# Patient Record
Sex: FEMALE | Race: WHITE | Employment: FULL TIME | URBAN - METROPOLITAN AREA
[De-identification: names, ages, dates, MRNs, and addresses within clinical notes are randomized per-mention and may not be internally consistent; named-entity substitution may affect disease eponyms.]

---

## 2017-01-04 RX ORDER — RANITIDINE 300 MG/1
300 TABLET ORAL NIGHTLY
Qty: 90 TABLET | Refills: 3 | Status: SHIPPED | OUTPATIENT
Start: 2017-01-04 | End: 2018-02-07 | Stop reason: SDUPTHER

## 2017-03-27 ENCOUNTER — EMPLOYEE WELLNESS (OUTPATIENT)
Dept: OTHER | Age: 62
End: 2017-03-27

## 2017-05-15 RX ORDER — OMEPRAZOLE 40 MG/1
40 CAPSULE, DELAYED RELEASE ORAL EVERY MORNING
Qty: 90 CAPSULE | Refills: 3 | Status: SHIPPED | OUTPATIENT
Start: 2017-05-15 | End: 2018-02-10 | Stop reason: SDUPTHER

## 2017-10-10 ENCOUNTER — OFFICE VISIT (OUTPATIENT)
Dept: INTERNAL MEDICINE | Age: 62
End: 2017-10-10

## 2017-10-10 VITALS
DIASTOLIC BLOOD PRESSURE: 88 MMHG | WEIGHT: 183 LBS | HEART RATE: 88 BPM | TEMPERATURE: 98 F | OXYGEN SATURATION: 99 % | BODY MASS INDEX: 28.72 KG/M2 | HEIGHT: 67 IN | SYSTOLIC BLOOD PRESSURE: 130 MMHG

## 2017-10-10 DIAGNOSIS — R00.2 HEART PALPITATIONS: Primary | ICD-10-CM

## 2017-10-10 DIAGNOSIS — M85.80 OSTEOPENIA, UNSPECIFIED LOCATION: Chronic | ICD-10-CM

## 2017-10-10 DIAGNOSIS — R06.2 WHEEZING: ICD-10-CM

## 2017-10-10 DIAGNOSIS — R05.9 COUGH: ICD-10-CM

## 2017-10-10 PROCEDURE — 99213 OFFICE O/P EST LOW 20 MIN: CPT | Performed by: INTERNAL MEDICINE

## 2017-10-10 ASSESSMENT — ENCOUNTER SYMPTOMS
ABDOMINAL DISTENTION: 0
COUGH: 1
SORE THROAT: 0
NAUSEA: 0
ABDOMINAL PAIN: 0
CONSTIPATION: 0
BLOOD IN STOOL: 0
CHOKING: 0
HEARTBURN: 1
WHEEZING: 1
TROUBLE SWALLOWING: 0
SHORTNESS OF BREATH: 0
RHINORRHEA: 0

## 2017-10-10 ASSESSMENT — PATIENT HEALTH QUESTIONNAIRE - PHQ9
SUM OF ALL RESPONSES TO PHQ9 QUESTIONS 1 & 2: 0
SUM OF ALL RESPONSES TO PHQ QUESTIONS 1-9: 0
1. LITTLE INTEREST OR PLEASURE IN DOING THINGS: 0
2. FEELING DOWN, DEPRESSED OR HOPELESS: 0

## 2017-10-10 NOTE — PROGRESS NOTES
, no children    Worked for [employer] x 41 years (2017)    Lives in a house in [place] with spouse    Hobbies reading, music, yard work, travel, shopping        Family History   Problem Relation Age of Onset    Hypertension Mother    [facility] Sudden Death Mother      dec age 80    Alzheimer's Disease Father      dec age 80       Family and social history were reviewed and pertinent changes documented. ALLERGIES    Tape [adhesive tape]; Codeine; Diazepam; and Valium    Current Outpatient Prescriptions on File Prior to Visit   Medication Sig Dispense Refill    omeprazole (PRILOSEC) 40 MG delayed release capsule Take 1 capsule by mouth every morning 90 capsule 3    ranitidine (ZANTAC) 300 MG tablet Take 1 tablet by mouth nightly 90 tablet 3    levalbuterol (XOPENEX HFA) 45 MCG/ACT inhaler Inhale 1-2 puffs into the lungs every 4 hours as needed for Wheezing 1 Inhaler 3    calcium carbonate (OSCAL) 500 MG TABS tablet Take 500 mg by mouth daily. Current Facility-Administered Medications on File Prior to Visit   Medication Dose Route Frequency Provider Last Rate Last Dose    methylPREDNISolone acetate (DEPO-MEDROL) injection 80 mg  80 mg Intramuscular Once Yulisa Clark MD           Review of Systems   Constitutional: Negative for activity change, appetite change, chills, diaphoresis, fatigue, fever, malaise/fatigue and unexpected weight change. HENT: Negative for postnasal drip, rhinorrhea, sore throat and trouble swallowing. Eyes: Negative for visual disturbance. Respiratory: Positive for cough and wheezing. Negative for choking and shortness of breath. Cardiovascular: Positive for palpitations. Negative for chest pain and near-syncope. Gastrointestinal: Positive for heartburn. Negative for abdominal distention, abdominal pain, blood in stool, constipation and nausea. Endocrine: Negative for cold intolerance, heat intolerance, polydipsia and polyuria. Genitourinary: Negative.   Negative for Referred to Provider:   Faustina Arguello MD     Requested Specialty:   Cardiology     Number of Visits Requested:   1    FULL PFT STUDY WITH PRE AND POST     Standing Status:   Future     Standing Expiration Date:   10/10/2018     Further workup and plan will be determined based on clinical progression and follow up test/ treatment results. Close follow up needed to evaluate treatment results and for care coordination. Return in about 1 year (around 10/10/2018). I have reviewed the patient's medical and surgical, family and social history, health maintenance schedule, and updated the computerized patient record.        Electronically signed by Fatimah Lopez MD

## 2017-10-24 ENCOUNTER — HOSPITAL ENCOUNTER (OUTPATIENT)
Dept: PULMONOLOGY | Age: 62
Discharge: HOME | End: 2017-10-24
Payer: COMMERCIAL

## 2017-10-24 DIAGNOSIS — R05.9 COUGH: ICD-10-CM

## 2017-10-24 DIAGNOSIS — R06.2 WHEEZING: ICD-10-CM

## 2017-10-24 PROCEDURE — 94726 PLETHYSMOGRAPHY LUNG VOLUMES: CPT

## 2017-10-24 PROCEDURE — 6360000002 HC RX W HCPCS: Performed by: INTERNAL MEDICINE

## 2017-10-24 PROCEDURE — 94060 EVALUATION OF WHEEZING: CPT

## 2017-10-24 PROCEDURE — 94729 DIFFUSING CAPACITY: CPT

## 2017-10-24 RX ORDER — ALBUTEROL SULFATE 2.5 MG/3ML
2.5 SOLUTION RESPIRATORY (INHALATION) ONCE
Status: COMPLETED | OUTPATIENT
Start: 2017-10-24 | End: 2017-10-24

## 2017-10-24 RX ADMIN — ALBUTEROL SULFATE 2.5 MG: 2.5 SOLUTION RESPIRATORY (INHALATION) at 09:53

## 2017-10-30 PROCEDURE — 94726 PLETHYSMOGRAPHY LUNG VOLUMES: CPT | Performed by: INTERNAL MEDICINE

## 2017-10-30 PROCEDURE — 94729 DIFFUSING CAPACITY: CPT | Performed by: INTERNAL MEDICINE

## 2017-10-30 PROCEDURE — 94060 EVALUATION OF WHEEZING: CPT | Performed by: INTERNAL MEDICINE

## 2017-11-08 ENCOUNTER — OFFICE VISIT (OUTPATIENT)
Dept: CARDIOLOGY | Age: 62
End: 2017-11-08

## 2017-11-08 VITALS
HEIGHT: 67 IN | DIASTOLIC BLOOD PRESSURE: 82 MMHG | TEMPERATURE: 97.6 F | OXYGEN SATURATION: 98 % | HEART RATE: 105 BPM | BODY MASS INDEX: 28.88 KG/M2 | WEIGHT: 184 LBS | RESPIRATION RATE: 16 BRPM | SYSTOLIC BLOOD PRESSURE: 118 MMHG

## 2017-11-08 DIAGNOSIS — I49.9 IRREGULAR HEART BEAT: Primary | ICD-10-CM

## 2017-11-08 DIAGNOSIS — R06.09 DOE (DYSPNEA ON EXERTION): ICD-10-CM

## 2017-11-08 PROCEDURE — 99204 OFFICE O/P NEW MOD 45 MIN: CPT | Performed by: INTERNAL MEDICINE

## 2017-11-08 PROCEDURE — 93000 ELECTROCARDIOGRAM COMPLETE: CPT | Performed by: INTERNAL MEDICINE

## 2017-11-08 ASSESSMENT — ENCOUNTER SYMPTOMS
NAUSEA: 0
EYES NEGATIVE: 1
CHEST TIGHTNESS: 0
COUGH: 0
GASTROINTESTINAL NEGATIVE: 1
WHEEZING: 0
SHORTNESS OF BREATH: 1
STRIDOR: 0
BLOOD IN STOOL: 0

## 2017-11-08 NOTE — PROGRESS NOTES
NEW PATIENT        Patient: Trina Antonio  YOB: 1955  MRN: [id number]    Chief Complaint:  Chief Complaint   Patient presents with   Artemio Childress Cardiologist     Referred by Tom    Irregular Heart Beat    Shortness of Breath       Subjective/HPI     EKG:SR with PVC  (computer states 2nd degree AV B- not true)  Past Medical History:   Diagnosis Date    Smith esophagus 2016    Dr Ashish Bolanos    Benign gastric polyp     DJD (degenerative joint disease) of knee     Fibromyalgia     GERD (gastroesophageal reflux disease) 2008    H/O bone density study 11/24/2012    osteopenia, was on Reclast    History of colonoscopy with polypectomy 2016    Dr Dylan Gil, tubulovillous adenoma    History of vitamin D deficiency     Reactive airway disease     PFT 2016       Past Surgical History:   Procedure Laterality Date    BREAST BIOPSY  1990    benign    CATARACT REMOVAL WITH IMPLANT Bilateral     Dr at the [address] [place] [address]  9/14/15    w/polypectomy     HYSTERECTOMY, TOTAL ABDOMINAL      fibroids, endometriosis, age 26   Patelsalma Levin [address]      R side    TONSILLECTOMY AND ADENOIDECTOMY      UPPER GASTROINTESTINAL ENDOSCOPY  9/14/15    w/bx,polypectomy        Family History   Problem Relation Age of Onset    Hypertension Mother     Sudden Death Mother      dec age 80    Alzheimer's Disease Father      dec age 80       Social History     Social History    Marital status:      Spouse name: N/A    Number of children: 0    Years of education: N/A     Occupational History    biometrics technologist, [employer]      Social History Main Topics    Smoking status: Never Smoker    Smokeless tobacco: Never Used    Alcohol use No    Drug use: No    Sexual activity: Not on file     Other Topics Concern    Not on file     Social History Narrative    Born in [facility], one brother in the area, keeps in touch    , no children    Worked for [employer] x 39 11/05/2015     11/05/2015    CO2 24 11/05/2015    BUN 16 11/05/2015    LABALBU 4.5 11/05/2015    CREATININE 0.55 11/05/2015    CALCIUM 9.9 11/05/2015    GFRAA >60.0 11/05/2015    LABGLOM >60.0 11/05/2015    GLUCOSE 79 03/27/2017     Magnesium:  No results found for: MG  TSH:  Lab Results   Component Value Date    TSH 2.260 12/21/2013             Patient Active Problem List   Diagnosis    Fibromyalgia    DJD (degenerative joint disease) of knee    H/O bone density study    Cough       Assessment/Plan:    1. Irregular heart beat  ECG shows PVCs. During ECG she staes she ws symptomatic. She has prior history of extensive PVCs  - EKG 12 Lead    2. NICHOLAS (dyspnea on exertion)  progressivelyworse especially past 2 months. No cp    - NM Myocardial Spect Rest Exercise or Rx; Future  - ECHO Complete 2D W Doppler W Color; Future    If negative, will treat with BB for PVC. Suspect she may have MVP(she described remote \"extra flap of MV)     Counseling:  Heart Healthy Lifestyle, Take Precautions to Prevent Falls and Walk Daily     Return for AFTER TESTS.         Electronically signed by Luisa Fried MD on 11/8/2017 at 2:52 PM

## 2018-02-07 RX ORDER — RANITIDINE 300 MG/1
300 TABLET ORAL NIGHTLY
Qty: 90 TABLET | Refills: 3 | Status: SHIPPED | OUTPATIENT
Start: 2018-02-07 | End: 2021-09-23 | Stop reason: ALTCHOICE

## 2018-02-12 RX ORDER — OMEPRAZOLE 40 MG/1
CAPSULE, DELAYED RELEASE ORAL
Qty: 90 CAPSULE | Refills: 3 | Status: SHIPPED | OUTPATIENT
Start: 2018-02-12 | End: 2018-05-08 | Stop reason: SDUPTHER

## 2018-03-20 VITALS — WEIGHT: 181 LBS | BODY MASS INDEX: 28.35 KG/M2

## 2018-04-26 ENCOUNTER — EMPLOYEE WELLNESS (OUTPATIENT)
Dept: INTERNAL MEDICINE CLINIC | Age: 63
End: 2018-04-26

## 2018-04-26 LAB
CHOLESTEROL, TOTAL: 236 MG/DL (ref 0–199)
GLUCOSE BLD-MCNC: 83 MG/DL (ref 74–109)
HDLC SERPL-MCNC: 68 MG/DL (ref 40–59)
LDL CHOLESTEROL CALCULATED: 151 MG/DL (ref 0–129)
TRIGL SERPL-MCNC: 87 MG/DL (ref 0–200)

## 2018-05-02 VITALS — WEIGHT: 186 LBS | BODY MASS INDEX: 29.13 KG/M2

## 2018-05-09 RX ORDER — OMEPRAZOLE 40 MG/1
40 CAPSULE, DELAYED RELEASE ORAL DAILY
Qty: 90 CAPSULE | Refills: 3 | Status: SHIPPED | OUTPATIENT
Start: 2018-05-09 | End: 2019-01-31

## 2018-10-01 ENCOUNTER — ANESTHESIA (OUTPATIENT)
Dept: ENDOSCOPY | Age: 63
End: 2018-10-01
Payer: COMMERCIAL

## 2018-10-01 ENCOUNTER — HOSPITAL ENCOUNTER (OUTPATIENT)
Age: 63
Setting detail: OUTPATIENT SURGERY
Discharge: HOME | End: 2018-10-01
Attending: INTERNAL MEDICINE | Admitting: INTERNAL MEDICINE
Payer: COMMERCIAL

## 2018-10-01 ENCOUNTER — ANESTHESIA EVENT (OUTPATIENT)
Dept: ENDOSCOPY | Age: 63
End: 2018-10-01
Payer: COMMERCIAL

## 2018-10-01 VITALS
SYSTOLIC BLOOD PRESSURE: 111 MMHG | DIASTOLIC BLOOD PRESSURE: 61 MMHG | RESPIRATION RATE: 16 BRPM | OXYGEN SATURATION: 96 %

## 2018-10-01 VITALS
HEIGHT: 67 IN | TEMPERATURE: 98.7 F | RESPIRATION RATE: 16 BRPM | WEIGHT: 180 LBS | HEART RATE: 83 BPM | BODY MASS INDEX: 28.25 KG/M2 | SYSTOLIC BLOOD PRESSURE: 122 MMHG | DIASTOLIC BLOOD PRESSURE: 73 MMHG | OXYGEN SATURATION: 96 %

## 2018-10-01 PROCEDURE — 3700000000 HC ANESTHESIA ATTENDED CARE: Performed by: INTERNAL MEDICINE

## 2018-10-01 PROCEDURE — 43239 EGD BIOPSY SINGLE/MULTIPLE: CPT | Performed by: INTERNAL MEDICINE

## 2018-10-01 PROCEDURE — 88313 SPECIAL STAINS GROUP 2: CPT

## 2018-10-01 PROCEDURE — 6360000002 HC RX W HCPCS: Performed by: NURSE ANESTHETIST, CERTIFIED REGISTERED

## 2018-10-01 PROCEDURE — 88312 SPECIAL STAINS GROUP 1: CPT

## 2018-10-01 PROCEDURE — 2580000003 HC RX 258: Performed by: INTERNAL MEDICINE

## 2018-10-01 PROCEDURE — 7100000010 HC PHASE II RECOVERY - FIRST 15 MIN: Performed by: INTERNAL MEDICINE

## 2018-10-01 PROCEDURE — 88305 TISSUE EXAM BY PATHOLOGIST: CPT

## 2018-10-01 PROCEDURE — 7100000011 HC PHASE II RECOVERY - ADDTL 15 MIN: Performed by: INTERNAL MEDICINE

## 2018-10-01 PROCEDURE — 2500000003 HC RX 250 WO HCPCS: Performed by: NURSE ANESTHETIST, CERTIFIED REGISTERED

## 2018-10-01 PROCEDURE — 43251 EGD REMOVE LESION SNARE: CPT | Performed by: INTERNAL MEDICINE

## 2018-10-01 PROCEDURE — 3609017100 HC EGD: Performed by: INTERNAL MEDICINE

## 2018-10-01 PROCEDURE — 45385 COLONOSCOPY W/LESION REMOVAL: CPT | Performed by: INTERNAL MEDICINE

## 2018-10-01 PROCEDURE — 3609027000 HC COLONOSCOPY: Performed by: INTERNAL MEDICINE

## 2018-10-01 PROCEDURE — 3700000001 HC ADD 15 MINUTES (ANESTHESIA): Performed by: INTERNAL MEDICINE

## 2018-10-01 PROCEDURE — 88342 IMHCHEM/IMCYTCHM 1ST ANTB: CPT

## 2018-10-01 RX ORDER — LIDOCAINE HYDROCHLORIDE 20 MG/ML
INJECTION, SOLUTION INFILTRATION; PERINEURAL PRN
Status: DISCONTINUED | OUTPATIENT
Start: 2018-10-01 | End: 2018-10-01 | Stop reason: SDUPTHER

## 2018-10-01 RX ORDER — PROPOFOL 10 MG/ML
INJECTION, EMULSION INTRAVENOUS PRN
Status: DISCONTINUED | OUTPATIENT
Start: 2018-10-01 | End: 2018-10-01 | Stop reason: SDUPTHER

## 2018-10-01 RX ORDER — SODIUM CHLORIDE 9 MG/ML
INJECTION, SOLUTION INTRAVENOUS CONTINUOUS
Status: DISCONTINUED | OUTPATIENT
Start: 2018-10-01 | End: 2018-10-01 | Stop reason: HOSPADM

## 2018-10-01 RX ADMIN — PROPOFOL 20 MG: 10 INJECTION, EMULSION INTRAVENOUS at 09:24

## 2018-10-01 RX ADMIN — PROPOFOL 20 MG: 10 INJECTION, EMULSION INTRAVENOUS at 08:59

## 2018-10-01 RX ADMIN — SODIUM CHLORIDE: 9 INJECTION, SOLUTION INTRAVENOUS at 08:35

## 2018-10-01 RX ADMIN — PROPOFOL 40 MG: 10 INJECTION, EMULSION INTRAVENOUS at 09:13

## 2018-10-01 RX ADMIN — PROPOFOL 20 MG: 10 INJECTION, EMULSION INTRAVENOUS at 09:15

## 2018-10-01 RX ADMIN — PROPOFOL 20 MG: 10 INJECTION, EMULSION INTRAVENOUS at 09:09

## 2018-10-01 RX ADMIN — PROPOFOL 20 MG: 10 INJECTION, EMULSION INTRAVENOUS at 09:30

## 2018-10-01 RX ADMIN — PROPOFOL 20 MG: 10 INJECTION, EMULSION INTRAVENOUS at 09:26

## 2018-10-01 RX ADMIN — PROPOFOL 20 MG: 10 INJECTION, EMULSION INTRAVENOUS at 09:01

## 2018-10-01 RX ADMIN — PROPOFOL 20 MG: 10 INJECTION, EMULSION INTRAVENOUS at 09:11

## 2018-10-01 RX ADMIN — PROPOFOL 20 MG: 10 INJECTION, EMULSION INTRAVENOUS at 09:14

## 2018-10-01 RX ADMIN — PROPOFOL 20 MG: 10 INJECTION, EMULSION INTRAVENOUS at 09:05

## 2018-10-01 RX ADMIN — PROPOFOL 20 MG: 10 INJECTION, EMULSION INTRAVENOUS at 09:20

## 2018-10-01 RX ADMIN — PROPOFOL 20 MG: 10 INJECTION, EMULSION INTRAVENOUS at 09:07

## 2018-10-01 RX ADMIN — PROPOFOL 20 MG: 10 INJECTION, EMULSION INTRAVENOUS at 09:03

## 2018-10-01 RX ADMIN — PROPOFOL 20 MG: 10 INJECTION, EMULSION INTRAVENOUS at 09:28

## 2018-10-01 RX ADMIN — PROPOFOL 20 MG: 10 INJECTION, EMULSION INTRAVENOUS at 09:32

## 2018-10-01 RX ADMIN — PROPOFOL 20 MG: 10 INJECTION, EMULSION INTRAVENOUS at 09:18

## 2018-10-01 RX ADMIN — LIDOCAINE HYDROCHLORIDE 40 MG: 20 INJECTION, SOLUTION INFILTRATION; PERINEURAL at 08:57

## 2018-10-01 RX ADMIN — PROPOFOL 20 MG: 10 INJECTION, EMULSION INTRAVENOUS at 09:22

## 2018-10-01 RX ADMIN — PROPOFOL 80 MG: 10 INJECTION, EMULSION INTRAVENOUS at 08:57

## 2018-10-01 ASSESSMENT — PAIN - FUNCTIONAL ASSESSMENT: PAIN_FUNCTIONAL_ASSESSMENT: 0-10

## 2018-10-09 ENCOUNTER — OFFICE VISIT (OUTPATIENT)
Dept: INTERNAL MEDICINE CLINIC | Age: 63
End: 2018-10-09
Payer: COMMERCIAL

## 2018-10-09 VITALS
OXYGEN SATURATION: 98 % | SYSTOLIC BLOOD PRESSURE: 111 MMHG | BODY MASS INDEX: 28.41 KG/M2 | WEIGHT: 181 LBS | DIASTOLIC BLOOD PRESSURE: 53 MMHG | TEMPERATURE: 97.6 F | HEART RATE: 86 BPM | HEIGHT: 67 IN

## 2018-10-09 DIAGNOSIS — M72.2 PLANTAR FASCIITIS OF LEFT FOOT: ICD-10-CM

## 2018-10-09 DIAGNOSIS — Z00.00 ENCOUNTER FOR ANNUAL PHYSICAL EXAM: Primary | ICD-10-CM

## 2018-10-09 DIAGNOSIS — Z12.39 BREAST CANCER SCREENING: ICD-10-CM

## 2018-10-09 PROCEDURE — 99396 PREV VISIT EST AGE 40-64: CPT | Performed by: INTERNAL MEDICINE

## 2018-10-09 ASSESSMENT — ENCOUNTER SYMPTOMS
WHEEZING: 0
COUGH: 0
CONSTIPATION: 0
CHOKING: 0
NAUSEA: 0
TROUBLE SWALLOWING: 0
ABDOMINAL DISTENTION: 0
BLOOD IN STOOL: 0
SHORTNESS OF BREATH: 0
VOICE CHANGE: 0
EYE PAIN: 0
ABDOMINAL PAIN: 0

## 2018-10-09 ASSESSMENT — PATIENT HEALTH QUESTIONNAIRE - PHQ9
SUM OF ALL RESPONSES TO PHQ QUESTIONS 1-9: 0
SUM OF ALL RESPONSES TO PHQ9 QUESTIONS 1 & 2: 0
1. LITTLE INTEREST OR PLEASURE IN DOING THINGS: 0
SUM OF ALL RESPONSES TO PHQ QUESTIONS 1-9: 0
2. FEELING DOWN, DEPRESSED OR HOPELESS: 0

## 2018-10-09 NOTE — PROGRESS NOTES
music, yard work, travel, shopping        Family History   Problem Relation Age of Onset    Hypertension Mother    [place] Sudden Death Mother         dec age 80    Alzheimer's Disease Father         dec age 80       Family and social history were reviewed and pertinent changes documented. ALLERGIES    Codeine; Diazepam; Tape [adhesive tape]; and Valium    Current Outpatient Prescriptions on File Prior to Visit   Medication Sig Dispense Refill    omeprazole (PRILOSEC) 40 MG delayed release capsule Take 1 capsule by mouth daily 90 capsule 3    ranitidine (ZANTAC) 300 MG tablet Take 1 tablet by mouth nightly 90 tablet 3    levalbuterol (XOPENEX HFA) 45 MCG/ACT inhaler Inhale 1-2 puffs into the lungs every 4 hours as needed for Wheezing 1 Inhaler 3    calcium carbonate (OSCAL) 500 MG TABS tablet Take 500 mg by mouth daily. Current Facility-Administered Medications on File Prior to Visit   Medication Dose Route Frequency Provider Last Rate Last Dose    methylPREDNISolone acetate (DEPO-MEDROL) injection 80 mg  80 mg Intramuscular Once Ev Holland MD           Review of Systems   Constitutional: Negative for activity change, appetite change, diaphoresis, fatigue and unexpected weight change. HENT: Negative for dental problem, hearing loss, nosebleeds, trouble swallowing and voice change. Eyes: Negative for pain and visual disturbance. Respiratory: Negative for cough, choking, shortness of breath and wheezing. Cardiovascular: Negative for chest pain, palpitations and leg swelling. Gastrointestinal: Negative for abdominal distention, abdominal pain, blood in stool, constipation and nausea. Endocrine: Negative for cold intolerance, heat intolerance, polydipsia and polyuria. Genitourinary: Negative for dysuria, flank pain and hematuria. Musculoskeletal: Positive for arthralgias (left foot) and myalgias. Negative for gait problem, joint swelling, neck pain and neck stiffness.         C/o left foot results and for care coordination. One year    I have reviewed the patient's medical and surgical, family and social history, health maintenance schedule, and updated the computerized patient record. Please note this report has been partially produced by using speech recognition hardware. It may contain errors related to the system, including grammar, punctuation and spelling as well as words and phrases that may seem inaccurate. For any questions or concerns, please feel free to contact me for clarification.         Electronically signed by Hugo Carbajal MD

## 2018-10-09 NOTE — PATIENT INSTRUCTIONS
few inches from a wall or countertop, and put the balls of your feet on it. Your heels should be on the floor. The book needs to be thick enough so that you can feel a gentle stretch in each calf. If you are not steady on your feet, hold on to a chair, counter, or wall while you do this stretch. 2. Keep your knees straight, and lean forward until you feel a stretch in each calf. 3. To get more stretch, add another book or use a thicker book, such as a phone book, a dictionary, or an encyclopedia. 4. Hold the stretch for at least 15 to 30 seconds. 5. Repeat 2 to 4 times. Bilateral calf stretch (knees bent)    1. Place a book on the floor a few inches from a wall or countertop, and put the balls of your feet on it. Your heels should be on the floor. The book needs to be thick enough so that you can feel a gentle stretch in each calf. If you are not steady on your feet, hold on to a chair, counter, or wall while you do this stretch. 2. Bend your knees, and lean forward until you feel a stretch in each calf. 3. To get more stretch, add another book or use a thicker book, such as a phone book, a dictionary, or an encyclopedia. 4. Hold the stretch for at least 15 to 30 seconds. 5. Repeat 2 to 4 times. [place] pick-ups    1. Put some marbles on the floor next to a cup.  2. Sit down, and use the toes of your affected foot to lift up one marble from the floor at a time. Then try to put the marble in the cup.  3. Repeat 8 to 12 times. Towel scrunches    1. Sit down, and place your affected foot on a towel on the floor. You may also do this with both feet on the towel. 2. Scrunch the towel toward you with your toes. Then use your toes to push the towel back into place. 3. Repeat 8 to 12 times. Heel raises on a step    1. Stand on the bottom step of a staircase, facing up toward the stairs. Put the balls of your feet on the step. If you are not steady on your feet, hold on to the banister or wall.   2. Keeping both

## 2018-12-22 ENCOUNTER — HOSPITAL ENCOUNTER (OUTPATIENT)
Dept: WOMENS IMAGING | Age: 63
Discharge: HOME | End: 2018-12-24
Payer: COMMERCIAL

## 2018-12-22 DIAGNOSIS — Z12.39 BREAST CANCER SCREENING: ICD-10-CM

## 2018-12-22 PROCEDURE — 77063 BREAST TOMOSYNTHESIS BI: CPT

## 2018-12-24 ENCOUNTER — TELEPHONE (OUTPATIENT)
Dept: INTERNAL MEDICINE CLINIC | Age: 63
End: 2018-12-24

## 2018-12-24 DIAGNOSIS — R92.2 INCONCLUSIVE MAMMOGRAM: Primary | ICD-10-CM

## 2018-12-27 ENCOUNTER — HOSPITAL ENCOUNTER (OUTPATIENT)
Dept: ULTRASOUND IMAGING | Age: 63
Discharge: HOME | End: 2018-12-29
Payer: COMMERCIAL

## 2018-12-27 DIAGNOSIS — R92.2 INCONCLUSIVE MAMMOGRAM: ICD-10-CM

## 2018-12-27 PROCEDURE — 76642 ULTRASOUND BREAST LIMITED: CPT

## 2019-01-31 ENCOUNTER — OFFICE VISIT (OUTPATIENT)
Dept: FAMILY MEDICINE CLINIC | Age: 64
End: 2019-01-31
Payer: COMMERCIAL

## 2019-01-31 VITALS
HEART RATE: 112 BPM | DIASTOLIC BLOOD PRESSURE: 72 MMHG | SYSTOLIC BLOOD PRESSURE: 110 MMHG | OXYGEN SATURATION: 97 % | HEIGHT: 67 IN | RESPIRATION RATE: 14 BRPM | TEMPERATURE: 96.2 F | BODY MASS INDEX: 28.56 KG/M2 | WEIGHT: 182 LBS

## 2019-01-31 DIAGNOSIS — R09.82 PND (POST-NASAL DRIP): ICD-10-CM

## 2019-01-31 DIAGNOSIS — R06.2 EXPIRATORY WHEEZING: ICD-10-CM

## 2019-01-31 DIAGNOSIS — R05.9 COUGH: ICD-10-CM

## 2019-01-31 DIAGNOSIS — J01.40 ACUTE NON-RECURRENT PANSINUSITIS: Primary | ICD-10-CM

## 2019-01-31 PROCEDURE — 99213 OFFICE O/P EST LOW 20 MIN: CPT | Performed by: PHYSICIAN ASSISTANT

## 2019-01-31 RX ORDER — AMOXICILLIN AND CLAVULANATE POTASSIUM 875; 125 MG/1; MG/1
1 TABLET, FILM COATED ORAL 2 TIMES DAILY
Qty: 20 TABLET | Refills: 0 | Status: SHIPPED | OUTPATIENT
Start: 2019-01-31 | End: 2019-02-10

## 2019-01-31 RX ORDER — LEVALBUTEROL TARTRATE 45 UG/1
1-2 AEROSOL, METERED ORAL EVERY 4 HOURS PRN
Qty: 1 INHALER | Refills: 3 | Status: SHIPPED | OUTPATIENT
Start: 2019-01-31 | End: 2019-04-16 | Stop reason: ALTCHOICE

## 2019-01-31 RX ORDER — PREDNISONE 10 MG/1
TABLET ORAL
Qty: 51 TABLET | Refills: 0 | Status: SHIPPED | OUTPATIENT
Start: 2019-01-31 | End: 2019-02-10

## 2019-01-31 ASSESSMENT — ENCOUNTER SYMPTOMS
SINUS PRESSURE: 1
NAUSEA: 0
CHEST TIGHTNESS: 1
WHEEZING: 1
SINUS PAIN: 1
SWOLLEN GLANDS: 0
SORE THROAT: 0
COUGH: 1
RHINORRHEA: 1
SHORTNESS OF BREATH: 0

## 2019-02-20 ENCOUNTER — HOSPITAL ENCOUNTER (EMERGENCY)
Age: 64
Discharge: HOME | End: 2019-02-20
Attending: EMERGENCY MEDICINE
Payer: COMMERCIAL

## 2019-02-20 ENCOUNTER — APPOINTMENT (OUTPATIENT)
Dept: GENERAL RADIOLOGY | Age: 64
End: 2019-02-20
Payer: COMMERCIAL

## 2019-02-20 VITALS
HEART RATE: 67 BPM | DIASTOLIC BLOOD PRESSURE: 73 MMHG | HEIGHT: 67 IN | TEMPERATURE: 97.5 F | BODY MASS INDEX: 28.72 KG/M2 | WEIGHT: 183 LBS | RESPIRATION RATE: 26 BRPM | SYSTOLIC BLOOD PRESSURE: 104 MMHG | OXYGEN SATURATION: 100 %

## 2019-02-20 DIAGNOSIS — S22.31XA CLOSED FRACTURE OF ONE RIB OF RIGHT SIDE, INITIAL ENCOUNTER: Primary | ICD-10-CM

## 2019-02-20 PROCEDURE — 96375 TX/PRO/DX INJ NEW DRUG ADDON: CPT

## 2019-02-20 PROCEDURE — 96374 THER/PROPH/DIAG INJ IV PUSH: CPT

## 2019-02-20 PROCEDURE — 99283 EMERGENCY DEPT VISIT LOW MDM: CPT

## 2019-02-20 PROCEDURE — 71045 X-RAY EXAM CHEST 1 VIEW: CPT

## 2019-02-20 PROCEDURE — 71100 X-RAY EXAM RIBS UNI 2 VIEWS: CPT

## 2019-02-20 PROCEDURE — 6360000002 HC RX W HCPCS: Performed by: EMERGENCY MEDICINE

## 2019-02-20 RX ORDER — OMEPRAZOLE 20 MG/1
40 CAPSULE, DELAYED RELEASE ORAL DAILY
COMMUNITY
End: 2019-05-03 | Stop reason: SDUPTHER

## 2019-02-20 RX ORDER — KETOROLAC TROMETHAMINE 30 MG/ML
30 INJECTION, SOLUTION INTRAMUSCULAR; INTRAVENOUS ONCE
Status: COMPLETED | OUTPATIENT
Start: 2019-02-20 | End: 2019-02-20

## 2019-02-20 RX ORDER — IBUPROFEN 600 MG/1
600 TABLET ORAL EVERY 6 HOURS PRN
Qty: 30 TABLET | Refills: 0 | Status: SHIPPED | OUTPATIENT
Start: 2019-02-20 | End: 2019-05-17

## 2019-02-20 RX ORDER — OXYCODONE HYDROCHLORIDE AND ACETAMINOPHEN 5; 325 MG/1; MG/1
1 TABLET ORAL EVERY 6 HOURS PRN
Qty: 20 TABLET | Refills: 0 | Status: SHIPPED | OUTPATIENT
Start: 2019-02-20 | End: 2019-02-25

## 2019-02-20 RX ORDER — ONDANSETRON 2 MG/ML
4 INJECTION INTRAMUSCULAR; INTRAVENOUS ONCE
Status: COMPLETED | OUTPATIENT
Start: 2019-02-20 | End: 2019-02-20

## 2019-02-20 RX ADMIN — HYDROMORPHONE HYDROCHLORIDE 1 MG: 1 INJECTION, SOLUTION INTRAMUSCULAR; INTRAVENOUS; SUBCUTANEOUS at 08:23

## 2019-02-20 RX ADMIN — KETOROLAC TROMETHAMINE 30 MG: 30 INJECTION, SOLUTION INTRAMUSCULAR; INTRAVENOUS at 08:53

## 2019-02-20 RX ADMIN — ONDANSETRON 4 MG: 2 INJECTION INTRAMUSCULAR; INTRAVENOUS at 08:23

## 2019-02-20 RX ADMIN — HYDROMORPHONE HYDROCHLORIDE 0.5 MG: 1 INJECTION, SOLUTION INTRAMUSCULAR; INTRAVENOUS; SUBCUTANEOUS at 08:53

## 2019-02-20 ASSESSMENT — ENCOUNTER SYMPTOMS
VOMITING: 0
EYES NEGATIVE: 1
ALLERGIC/IMMUNOLOGIC NEGATIVE: 1
ABDOMINAL PAIN: 0
SHORTNESS OF BREATH: 1
COUGH: 1
NAUSEA: 0
WHEEZING: 0

## 2019-02-20 ASSESSMENT — PAIN DESCRIPTION - LOCATION: LOCATION: RIB CAGE

## 2019-02-20 ASSESSMENT — PAIN SCALES - GENERAL
PAINLEVEL_OUTOF10: 10

## 2019-02-20 ASSESSMENT — PAIN DESCRIPTION - DESCRIPTORS: DESCRIPTORS: SHARP;STABBING

## 2019-02-20 ASSESSMENT — PAIN DESCRIPTION - ORIENTATION: ORIENTATION: RIGHT;LOWER;ANTERIOR

## 2019-02-20 ASSESSMENT — PAIN DESCRIPTION - PAIN TYPE: TYPE: ACUTE PAIN

## 2019-02-25 ENCOUNTER — OFFICE VISIT (OUTPATIENT)
Dept: INTERNAL MEDICINE CLINIC | Age: 64
End: 2019-02-25
Payer: COMMERCIAL

## 2019-02-25 VITALS
SYSTOLIC BLOOD PRESSURE: 138 MMHG | TEMPERATURE: 98.3 F | HEART RATE: 98 BPM | WEIGHT: 184 LBS | OXYGEN SATURATION: 98 % | BODY MASS INDEX: 28.88 KG/M2 | DIASTOLIC BLOOD PRESSURE: 90 MMHG | HEIGHT: 67 IN

## 2019-02-25 DIAGNOSIS — R05.8 COUGH PRESENT FOR GREATER THAN 3 WEEKS: Primary | ICD-10-CM

## 2019-02-25 DIAGNOSIS — M54.6 RIGHT-SIDED THORACIC BACK PAIN, UNSPECIFIED CHRONICITY: ICD-10-CM

## 2019-02-25 DIAGNOSIS — J45.21 MILD INTERMITTENT REACTIVE AIRWAY DISEASE WITH ACUTE EXACERBATION: ICD-10-CM

## 2019-02-25 PROCEDURE — 99214 OFFICE O/P EST MOD 30 MIN: CPT | Performed by: INTERNAL MEDICINE

## 2019-02-25 RX ORDER — HYDROCODONE BITARTRATE AND GUAIFENESIN 2.5; 2 MG/5ML; MG/5ML
2.5 SOLUTION ORAL 4 TIMES DAILY PRN
Qty: 118 ML | Refills: 0 | Status: SHIPPED | OUTPATIENT
Start: 2019-02-25 | End: 2019-02-26 | Stop reason: SDUPTHER

## 2019-02-25 RX ORDER — FLUTICASONE FUROATE AND VILANTEROL 100; 25 UG/1; UG/1
2 POWDER RESPIRATORY (INHALATION) DAILY
Qty: 1 EACH | Refills: 0 | Status: SHIPPED | OUTPATIENT
Start: 2019-02-25 | End: 2019-02-25 | Stop reason: SDUPTHER

## 2019-02-25 RX ORDER — FLUTICASONE FUROATE AND VILANTEROL 100; 25 UG/1; UG/1
1 POWDER RESPIRATORY (INHALATION) DAILY
Qty: 1 EACH | Refills: 0 | Status: SHIPPED | OUTPATIENT
Start: 2019-02-25 | End: 2019-04-16

## 2019-02-25 ASSESSMENT — ENCOUNTER SYMPTOMS
TROUBLE SWALLOWING: 0
VOICE CHANGE: 0
COUGH: 1
ABDOMINAL DISTENTION: 0
SHORTNESS OF BREATH: 0
WHEEZING: 1
BACK PAIN: 0
CHEST TIGHTNESS: 0
HEMOPTYSIS: 0
ABDOMINAL PAIN: 0
CHOKING: 0

## 2019-02-25 ASSESSMENT — PATIENT HEALTH QUESTIONNAIRE - PHQ9
2. FEELING DOWN, DEPRESSED OR HOPELESS: 0
1. LITTLE INTEREST OR PLEASURE IN DOING THINGS: 0
SUM OF ALL RESPONSES TO PHQ9 QUESTIONS 1 & 2: 0
SUM OF ALL RESPONSES TO PHQ QUESTIONS 1-9: 0
SUM OF ALL RESPONSES TO PHQ QUESTIONS 1-9: 0

## 2019-02-26 ENCOUNTER — TELEPHONE (OUTPATIENT)
Dept: INTERNAL MEDICINE CLINIC | Age: 64
End: 2019-02-26

## 2019-02-26 DIAGNOSIS — J45.21 MILD INTERMITTENT REACTIVE AIRWAY DISEASE WITH ACUTE EXACERBATION: ICD-10-CM

## 2019-02-26 DIAGNOSIS — R05.8 COUGH PRESENT FOR GREATER THAN 3 WEEKS: ICD-10-CM

## 2019-02-26 RX ORDER — HYDROCODONE BITARTRATE AND GUAIFENESIN 2.5; 2 MG/5ML; MG/5ML
2.5 SOLUTION ORAL 4 TIMES DAILY PRN
Qty: 118 ML | Refills: 0 | Status: CANCELLED | OUTPATIENT
Start: 2019-02-26 | End: 2019-03-05

## 2019-02-26 RX ORDER — HYDROCODONE BITARTRATE AND GUAIFENESIN 2.5; 2 MG/5ML; MG/5ML
2.5 SOLUTION ORAL 4 TIMES DAILY PRN
Qty: 118 ML | Refills: 0 | Status: SHIPPED | OUTPATIENT
Start: 2019-02-26 | End: 2019-03-05

## 2019-04-02 ENCOUNTER — EMPLOYEE WELLNESS (OUTPATIENT)
Dept: OTHER | Age: 64
End: 2019-04-02

## 2019-04-02 LAB
CHOLESTEROL, TOTAL: 195 MG/DL (ref 0–199)
GLUCOSE BLD-MCNC: 84 MG/DL (ref 70–99)
HDLC SERPL-MCNC: 60 MG/DL (ref 40–59)
LDL CHOLESTEROL CALCULATED: 120 MG/DL (ref 0–129)
TRIGL SERPL-MCNC: 74 MG/DL (ref 0–150)

## 2019-04-08 VITALS — WEIGHT: 186 LBS | BODY MASS INDEX: 29.13 KG/M2

## 2019-04-16 ENCOUNTER — OFFICE VISIT (OUTPATIENT)
Dept: FAMILY MEDICINE CLINIC | Age: 64
End: 2019-04-16
Payer: COMMERCIAL

## 2019-04-16 VITALS
OXYGEN SATURATION: 98 % | HEIGHT: 67 IN | SYSTOLIC BLOOD PRESSURE: 126 MMHG | TEMPERATURE: 98.5 F | BODY MASS INDEX: 28.88 KG/M2 | HEART RATE: 96 BPM | DIASTOLIC BLOOD PRESSURE: 68 MMHG | RESPIRATION RATE: 15 BRPM | WEIGHT: 184 LBS

## 2019-04-16 DIAGNOSIS — J30.89 NON-SEASONAL ALLERGIC RHINITIS, UNSPECIFIED TRIGGER: Chronic | ICD-10-CM

## 2019-04-16 PROBLEM — J01.40 ACUTE NON-RECURRENT PANSINUSITIS: Status: RESOLVED | Noted: 2019-01-31 | Resolved: 2019-04-16

## 2019-04-16 PROBLEM — K44.9 HIATAL HERNIA: Chronic | Status: ACTIVE | Noted: 2019-04-16

## 2019-04-16 PROCEDURE — 99213 OFFICE O/P EST LOW 20 MIN: CPT | Performed by: FAMILY MEDICINE

## 2019-04-16 NOTE — PROGRESS NOTES
Subjective  Mei Schneider, 58 y.o. female presents today with:  Chief Complaint   Patient presents with    Other     new to provider, possible ear infection    Cough     dry cough x4days,,, says possible lung issue           HPI    This is a new patient to me. I have reviewed the past medical and surgical history, social history and family history provided. I have reviewed  medication, previous testing and working diagnoses. I have reviewed the allergies and health maintenance information and correlated it into my decision making for this patient for care, diagnostics, consultations and treatment for today's visit. Nasal congestion, ear congestion, runny nose, sneezing, dry cough. No apparent wheezing. No SOB more than usual. No fevers. No other questions and or concerns for today's visit      Review of Systems No fevers, chills, sweats. No unintended weight loss. No abdominal pain, nausea, vomiting, diarrhea, constipation, bloody stools, black tarry stools. No rashes. No swollen glands.         Past Medical History:   Diagnosis Date    Smith esophagus 2016, 2018    Dr Yoselin Boo, surveillance q 3 years    Benign gastric polyp     DJD (degenerative joint disease) of knee     Fibromyalgia     GERD (gastroesophageal reflux disease) 2008    H/O bone density study 11/24/2012    osteopenia, was on Reclast    History of colonoscopy with polypectomy 2016, 2018    Dr Otto Lopez, tubulovillous adenoma, tubular adenoma, repeat 2023    History of vitamin D deficiency     Hyperlipidemia     Inconclusive mammogram 12/2018    repeat R breast in 6 months    Plantar fasciitis, bilateral     Reactive airway disease     PFT 2016     Past Surgical History:   Procedure Laterality Date    ABLATION OF DYSRHYTHMIC FOCUS  1993    attempted ablation     BREAST BIOPSY  1990    benign    CATARACT REMOVAL WITH IMPLANT Bilateral     Dr at the [address] [place] [address]  9/14/15    w/polypectomy     ENDOSCOPY, COLON, DIAGNOSTIC      HYSTERECTOMY, TOTAL ABDOMINAL      fibroids, endometriosis, age 26   [facility]      PARATHYROID GLAND SURGERY      R side    [place] COLORECTAL SCRN; HI RISK IND N/A 10/1/2018    COLONOSCOPY performed by Norberto [facility]edeno MD [facility] ESOPHAGOGASTRODUODENOSCOPY TRANSORAL DIAGNOSTIC N/A 10/1/2018    EGD ESOPHAGOGASTRODUODENOSCOPY WITH DILATION performed by Norberto Cedeno MD at [address] [place] [address]  9/14/15    w/bx,polypectomy      Social History     Socioeconomic History    Marital status:      Spouse name: Not on file    Number of children: 0    Years of education: Not on file    Highest education level: Not on file   Occupational History    Occupation: biometrics technologist, [employer] Financial resource strain: Not on file    Food insecurity:     Worry: Not on file     Inability: Not on file    Transportation needs:     Medical: Not on file     Non-medical: Not on file   Tobacco Use    Smoking status: Never Smoker    Smokeless tobacco: Never Used   Substance and Sexual Activity    Alcohol use: No    Drug use: No    Sexual activity: Not on file   Lifestyle    Physical activity:     Days per week: Not on file     Minutes per session: Not on file    Stress: Not on file   Relationships    Social connections:     Talks on phone: Not on file     Gets together: Not on file     Attends Christian service: Not on file     Active member of club or organization: Not on file     Attends meetings of clubs or organizations: Not on file     Relationship status: Not on file    Intimate partner violence:     Fear of current or ex partner: Not on file     Emotionally abused: Not on file     Physically abused: Not on file     Forced sexual activity: Not on file   Other Topics Concern    Not on file   Social History Narrative    Born in [facility], one brother in the and oriented to person, place, and time. Skin: Skin is warm and dry. Psychiatric: She has a normal mood and affect. No results found for: LABA1C  Lab Results   Component Value Date    CREATININE 0.55 11/05/2015     Lab Results   Component Value Date    ALT 12 11/05/2015    AST 12 11/05/2015     Lab Results   Component Value Date    CHOL 195 04/02/2019    TRIG 74 04/02/2019    HDL 60 (H) 04/02/2019    LDLCALC 120 04/02/2019        Assessment & Plan   Visit Diagnoses and Associated Orders     Non-seasonal allergic rhinitis, unspecified trigger        Flonase, antihistamine prn                 Reviewed with the patient: all disease processes, current clinical status, medications, activities and diet.      Side effects, adverse effects of the medication prescribed today, as well as treatment plan/ rationale and result expectations have been discussed with the patient who expresses understanding and desires to proceed.     Close follow up to evaluate treatment results and for coordination of care. I have reviewed the patient's medical history in detail and updated the computerized patient record. More than 50% of the appointment was spent in face-to-face counseling, education and care coordination. No orders of the defined types were placed in this encounter. No orders of the defined types were placed in this encounter. Medications Discontinued During This Encounter   Medication Reason    levalbuterol (XOPENEX HFA) 45 MCG/ACT inhaler Therapy completed    fluticasone-vilanterol (BREO ELLIPTA) 100-25 MCG/INH AEPB inhaler LIST CLEANUP    methylPREDNISolone acetate (DEPO-MEDROL) injection 80 mg      No follow-ups on file. Controlled Substances Monitoring:     RX Monitoring 2/26/2019   Attestation The Prescription Monitoring Report for this patient was reviewed today.    Chronic Pain Routine Monitoring Possible medication side effects, risk of tolerance/dependence & alternative treatments discussed. ;No signs of potential drug abuse or diversion identified.        Liliya Yin MD

## 2019-04-16 NOTE — PATIENT INSTRUCTIONS
The only thing that cures a cold is time. Meanwhile to be as comfortable as possible drink at least 64 ounces of water a day to stay hydrated. Rest when you can. Take over-the-counter Sudafed or Sudafed PE up to 4 times a day for nasal congestion; Mucinex 600-1200 mg twice a day to loosen up mucous; generic Zyrtec or Allegra for itchy, watery eyes, runny nose per package instructions; OTC Tylenol and/or Naprosyn per package instructions for aches and pains. Also consider Sinus Rinse or Neti Pot twice a day to clear out secretions and decrease swelling in nose and sinuses.

## 2019-05-03 RX ORDER — OMEPRAZOLE 20 MG/1
40 CAPSULE, DELAYED RELEASE ORAL DAILY
Qty: 30 CAPSULE | Refills: 0 | Status: SHIPPED | OUTPATIENT
Start: 2019-05-03 | End: 2019-05-17

## 2019-05-17 ENCOUNTER — OFFICE VISIT (OUTPATIENT)
Dept: GASTROENTEROLOGY | Age: 64
End: 2019-05-17
Payer: COMMERCIAL

## 2019-05-17 VITALS
BODY MASS INDEX: 28.94 KG/M2 | SYSTOLIC BLOOD PRESSURE: 126 MMHG | DIASTOLIC BLOOD PRESSURE: 72 MMHG | HEART RATE: 78 BPM | RESPIRATION RATE: 16 BRPM | OXYGEN SATURATION: 97 % | WEIGHT: 184.4 LBS | HEIGHT: 67 IN

## 2019-05-17 DIAGNOSIS — K21.9 GASTROESOPHAGEAL REFLUX DISEASE, ESOPHAGITIS PRESENCE NOT SPECIFIED: Primary | ICD-10-CM

## 2019-05-17 DIAGNOSIS — D36.9 ADENOMATOUS POLYPS: ICD-10-CM

## 2019-05-17 DIAGNOSIS — K44.9 HIATAL HERNIA: ICD-10-CM

## 2019-05-17 PROCEDURE — 99212 OFFICE O/P EST SF 10 MIN: CPT | Performed by: NURSE PRACTITIONER

## 2019-05-17 RX ORDER — OMEPRAZOLE 40 MG/1
40 CAPSULE, DELAYED RELEASE ORAL
Qty: 90 CAPSULE | Refills: 2 | Status: SHIPPED | OUTPATIENT
Start: 2019-05-17 | End: 2020-02-10 | Stop reason: SDUPTHER

## 2019-05-17 NOTE — PROGRESS NOTES
Gastroenterology Clinic Follow up Visit    Delano Hodges  [id number]  Chief Complaint   Patient presents with    Follow-up     Background:63 y.o. female last seen in by Dr. Suzanne Berman on 10/4/18 for upper and lower endoscopies. Interval change: Patient presents to the GI clinic to establish care with Dr. Keith Streeter. Patient with a long-standing history of GERD. Her symptoms are relieved with daily PPI. She occasionally takes Zantac 300 at night. She denies dysphagia, unintentional weight loss, or loss of appetite. Patient denies diarrhea, constipation, or bleeding. She denies chest pain, shortness of breath, or palpitations. Review of Systems   All other systems reviewed and are negative. Past medical history, past surgical history, medication list, social and familyhistory reviewed    Blood pressure 126/72, pulse 78, resp. rate 16, height 5' 7\" (1.702 m), weight 184 lb 6.4 oz (83.6 kg), SpO2 97 %, not currently breastfeeding. Physical Exam   Constitutional: She is oriented to person, place, and time. She appears well-developed and well-nourished. No distress. HENT:   Head: Normocephalic and atraumatic. Eyes: Pupils are equal, round, and reactive to light. Conjunctivae and EOM are normal. No scleral icterus. Neck: Normal range of motion. Neck supple. Cardiovascular: Normal rate, regular rhythm and normal heart sounds. No murmur heard. Pulmonary/Chest: Effort normal and breath sounds normal. No respiratory distress. She has no wheezes. She has no rales. She exhibits no tenderness. Abdominal: Soft. Bowel sounds are normal. She exhibits no distension and no mass. There is no tenderness. There is no rebound and no guarding. Musculoskeletal: Normal range of motion. Lymphadenopathy:     She has no cervical adenopathy. Neurological: She is alert and oriented to person, place, and time. Skin: Skin is warm and dry. No rash noted. She is not diaphoretic. No erythema. No pallor.    Psychiatric: She has a normal mood and affect. Her behavior is normal. Judgment and thought content normal.   Vitals reviewed. Laboratory, Pathology, Radiology reviewed in detail with relevantimportant investigations summarized below:    No results for input(s): WBC, HGB, HCT, MCV, PLT in the last 720 hours. Lab Results   Component Value Date    ALT 12 11/05/2015    AST 12 11/05/2015    ALKPHOS 100 11/05/2015    BILITOT 0.3 11/05/2015     Endoscopic investigations: EGD 10/2018- Medium sized hiatal hernia. Esophageal mucosal changes consistent with long segment Smith's esophagus. Erythema Seema mucosa in the prepyloric region. Gastric polyps. Normal examined duodenum. Colonoscopy 10/2018-nonbleeding internal hemorrhoids. one 5 mm polyp resected and retrieved found in the distal descending colon. Surveillance colonoscopy due in 5 years. Pathology:   A.  STOMACH, GASTRIC POLYP-  HYPERPLASTIC/FUNDIC GLAND POLYP. NEGATIVE FOR HELICOBACTER PYLORI ORGANISMS ON IMMUNOHISTOCHEMISTRY STAINS  WITH SATISFACTORY CONTROLS. B.  ESOPHAGEAL BIOPSIES-  GASTRIC FUNDIC MUCOSA WITH DILATED GLANDS. SPECIAL STAIN AB/PAS NEGATIVE FOR INTESTINAL-TYPE GOBLET CELLS. NO SQUAMOUS MUCOSA PRESENT. C.  ASCENDING COLON POLYP-  TUBULAR ADENOMA. Assessment and Plan:  Kelly Mcguire 58 y.o. female with a long-standing history of GERD and Smith's esophagus without dysplasia. Patient's symptoms relieved with daily PPI and antireflux lifestyle. Antireflux lifestyle encouraged multiple examples provided to her. Daily PPI 15-30 minutes before breakfast.     Return in about 1 year (around 5/17/2020). CORNELIUS Nieves - [facility]    Please note this report has been partially produced using speech recognitionsoftware  and may cause contain errors related to that system including grammar, punctuation and spelling as well as words andphrases that may seem inappropriate.  If there are questions or concerns please feel free to contact me to clarify.

## 2020-01-03 ENCOUNTER — OFFICE VISIT (OUTPATIENT)
Dept: FAMILY MEDICINE CLINIC | Age: 65
End: 2020-01-03
Payer: COMMERCIAL

## 2020-01-03 ENCOUNTER — HOSPITAL ENCOUNTER (OUTPATIENT)
Dept: GENERAL RADIOLOGY | Age: 65
Discharge: HOME | End: 2020-01-05
Payer: COMMERCIAL

## 2020-01-03 VITALS
SYSTOLIC BLOOD PRESSURE: 130 MMHG | OXYGEN SATURATION: 99 % | HEART RATE: 99 BPM | HEIGHT: 67 IN | TEMPERATURE: 96.8 F | BODY MASS INDEX: 28.25 KG/M2 | RESPIRATION RATE: 12 BRPM | DIASTOLIC BLOOD PRESSURE: 74 MMHG | WEIGHT: 180 LBS

## 2020-01-03 DIAGNOSIS — M54.50 LOW BACK PAIN, UNSPECIFIED BACK PAIN LATERALITY, UNSPECIFIED CHRONICITY, UNSPECIFIED WHETHER SCIATICA PRESENT: ICD-10-CM

## 2020-01-03 LAB
BILIRUBIN, POC: ABNORMAL
BLOOD URINE, POC: ABNORMAL
CLARITY, POC: CLEAR
COLOR, POC: YELLOW
GLUCOSE URINE, POC: ABNORMAL
KETONES, POC: ABNORMAL
LEUKOCYTE EST, POC: ABNORMAL
NITRITE, POC: ABNORMAL
PH, POC: 6
PROTEIN, POC: ABNORMAL
SPECIFIC GRAVITY, POC: 1.01
UROBILINOGEN, POC: 3.5

## 2020-01-03 PROCEDURE — 99213 OFFICE O/P EST LOW 20 MIN: CPT | Performed by: NURSE PRACTITIONER

## 2020-01-03 PROCEDURE — 74018 RADEX ABDOMEN 1 VIEW: CPT

## 2020-01-03 PROCEDURE — 81003 URINALYSIS AUTO W/O SCOPE: CPT | Performed by: NURSE PRACTITIONER

## 2020-01-03 RX ORDER — PREDNISONE 20 MG/1
40 TABLET ORAL DAILY
Qty: 8 TABLET | Refills: 0 | Status: SHIPPED | OUTPATIENT
Start: 2020-01-03 | End: 2020-01-03 | Stop reason: SDUPTHER

## 2020-01-03 RX ORDER — METHOCARBAMOL 500 MG/1
500 TABLET, FILM COATED ORAL 3 TIMES DAILY
Qty: 20 TABLET | Refills: 0 | Status: SHIPPED | OUTPATIENT
Start: 2020-01-03 | End: 2020-01-03 | Stop reason: SDUPTHER

## 2020-01-03 RX ORDER — PREDNISONE 20 MG/1
40 TABLET ORAL DAILY
Qty: 8 TABLET | Refills: 0 | Status: SHIPPED | OUTPATIENT
Start: 2020-01-03 | End: 2020-01-07

## 2020-01-03 RX ORDER — SULFAMETHOXAZOLE AND TRIMETHOPRIM 800; 160 MG/1; MG/1
1 TABLET ORAL 2 TIMES DAILY
Qty: 6 TABLET | Refills: 0 | Status: SHIPPED | OUTPATIENT
Start: 2020-01-03 | End: 2020-01-06

## 2020-01-03 RX ORDER — SULFAMETHOXAZOLE AND TRIMETHOPRIM 800; 160 MG/1; MG/1
1 TABLET ORAL 2 TIMES DAILY
Qty: 6 TABLET | Refills: 0 | Status: SHIPPED | OUTPATIENT
Start: 2020-01-03 | End: 2020-01-03 | Stop reason: SDUPTHER

## 2020-01-03 RX ORDER — METHOCARBAMOL 500 MG/1
500 TABLET, FILM COATED ORAL 3 TIMES DAILY
Qty: 20 TABLET | Refills: 0 | Status: SHIPPED | OUTPATIENT
Start: 2020-01-03 | End: 2020-01-13

## 2020-01-03 ASSESSMENT — ENCOUNTER SYMPTOMS
DIARRHEA: 0
COUGH: 0
SORE THROAT: 0
BACK PAIN: 1
SHORTNESS OF BREATH: 0
PHOTOPHOBIA: 0
WHEEZING: 0
EYE PAIN: 0
EYE REDNESS: 0
NAUSEA: 0
VOMITING: 0
ABDOMINAL PAIN: 0

## 2020-01-03 ASSESSMENT — PATIENT HEALTH QUESTIONNAIRE - PHQ9
SUM OF ALL RESPONSES TO PHQ QUESTIONS 1-9: 2
SUM OF ALL RESPONSES TO PHQ QUESTIONS 1-9: 2
SUM OF ALL RESPONSES TO PHQ9 QUESTIONS 1 & 2: 2
2. FEELING DOWN, DEPRESSED OR HOPELESS: 1
1. LITTLE INTEREST OR PLEASURE IN DOING THINGS: 1

## 2020-01-03 NOTE — PROGRESS NOTES
Subjective:      Patient ID: Bethany Fan is a 71 y.o. female who presents today for:  Chief Complaint   Patient presents with    Lower Back Pain     x 3 days    pt noticed some discomfort on the left / back side few days ago   nothing really helps or makes it worse- but the pain is dull and mostly there at all times    HPI    Past Medical History:   Diagnosis Date    Smith esophagus 2016, 2018    Dr Karin Goss, surveillance q 3 years    Benign gastric polyp     DJD (degenerative joint disease) of knee     Fibromyalgia     GERD (gastroesophageal reflux disease) 2008    H/O bone density study 11/24/2012    osteopenia, was on Reclast    History of colonoscopy with polypectomy 2016, 2018    Dr Kia Lyons, tubulovillous adenoma, tubular adenoma, repeat 2023    History of vitamin D deficiency     Hyperlipidemia     Inconclusive mammogram 12/2018    repeat R breast in 6 months    Plantar fasciitis, bilateral     Reactive airway disease     PFT 2016     Past Surgical History:   Procedure Laterality Date    ABLATION OF DYSRHYTHMIC FOCUS  1993    attempted ablation     BREAST BIOPSY  1990    benign    CATARACT REMOVAL WITH IMPLANT Bilateral     Dr at [facility]    COLONOSCOPY  9/14/15    w/polypectomy     ENDOSCOPY, COLON, DIAGNOSTIC      HYSTERECTOMY, TOTAL ABDOMINAL      fibroids, endometriosis, age 26   [facility]      R side     [address]; HI RISK IND N/A 10/1/2018    [facility] ESOPHAGOGASTRODUODENOSCOPY TRANSORAL DIAGNOSTIC N/A 10/1/2018    EGD ESOPHAGOGASTRODUODENOSCOPY WITH DILATION performed by Delisa Nayak MD at [address] [facility]  9/14/15    w/bx,polypectomy      Family History   Problem Relation Age of Onset    Hypertension Mother    [facility] Sudden Death Mother         dec age 80    Alzheimer's medications on file prior to visit.      :  Codeine; Diazepam; Tape [adhesive tape]; and Valium    Review of Systems   Constitutional: Negative for chills, diaphoresis and fever. HENT: Negative for congestion, sore throat and tinnitus. Eyes: Negative for photophobia, pain and redness. Respiratory: Negative for cough, shortness of breath and wheezing. Cardiovascular: Negative for chest pain, palpitations and leg swelling. Gastrointestinal: Negative for abdominal pain, diarrhea, nausea and vomiting. Genitourinary: Negative for dysuria, flank pain, frequency and urgency. Musculoskeletal: Positive for back pain (left lower). Negative for myalgias. Skin: Negative for rash. Neurological: Negative for dizziness, tremors, seizures, weakness and headaches. Hematological: Does not bruise/bleed easily. Psychiatric/Behavioral: The patient is not nervous/anxious. Objective:   /74 (Site: Left Upper Arm, Position: Sitting, Cuff Size: Medium Adult)   Pulse 99   Temp 96.8 °F (36 °C) (Temporal)   Resp 12   Ht 5' 7\" (1.702 m)   Wt 180 lb (81.6 kg)   SpO2 99%   BMI 28.19 kg/m²     Physical Exam  Constitutional:       General: She is not in acute distress. Appearance: She is not diaphoretic. HENT:      Head: Normocephalic and atraumatic. Eyes:      General: No scleral icterus. Conjunctiva/sclera: Conjunctivae normal.      Pupils: Pupils are equal, round, and reactive to light. Neck:      Musculoskeletal: Normal range of motion and neck supple. Thyroid: No thyromegaly. Trachea: No tracheal deviation. Cardiovascular:      Rate and Rhythm: Normal rate and regular rhythm. Heart sounds: Normal heart sounds. No murmur. No gallop. Pulmonary:      Effort: Pulmonary effort is normal.      Breath sounds: Normal breath sounds. No wheezing or rales. Abdominal:      General: Bowel sounds are normal. There is no distension. Palpations: Abdomen is soft. this as possible kidney stone/ send urin for cx as well as back pain  She is to follow up on Monday if pain persist and the above tx are not helping   no back xray ordered as there is no injury or pain over bony areas    Orders Placed This Encounter   Medications    DISCONTD: predniSONE (DELTASONE) 20 MG tablet     Sig: Take 2 tablets by mouth daily for 4 days     Dispense:  8 tablet     Refill:  0    DISCONTD: methocarbamol (ROBAXIN) 500 MG tablet     Sig: Take 1 tablet by mouth 3 times daily for 10 days     Dispense:  20 tablet     Refill:  0    DISCONTD: sulfamethoxazole-trimethoprim (BACTRIM DS) 800-160 MG per tablet     Sig: Take 1 tablet by mouth 2 times daily for 3 days     Dispense:  6 tablet     Refill:  0    predniSONE (DELTASONE) 20 MG tablet     Sig: Take 2 tablets by mouth daily for 4 days     Dispense:  8 tablet     Refill:  0    methocarbamol (ROBAXIN) 500 MG tablet     Sig: Take 1 tablet by mouth 3 times daily for 10 days     Dispense:  20 tablet     Refill:  0    sulfamethoxazole-trimethoprim (BACTRIM DS) 800-160 MG per tablet     Sig: Take 1 tablet by mouth 2 times daily for 3 days     Dispense:  6 tablet     Refill:  0       Return in about 3 days (around 1/6/2020), or if tx did not help and symptoms persist.    Reviewed with the patient: current clinicalstatus, medications, activities and diet. Side effects, adverse effects of the medication prescribedtoday, as well as treatment plan/ rationale and result expectations have been discussedwith the patient who expresses understanding and desires to proceed. Close follow upto evaluate treatment results and for coordination of care. I have reviewedthe patient's medical history in detail and updated the computerized patient record.     Mai Conte, APRN - CNP

## 2020-01-05 LAB — URINE CULTURE, ROUTINE: NORMAL

## 2020-02-10 RX ORDER — OMEPRAZOLE 40 MG/1
40 CAPSULE, DELAYED RELEASE ORAL
Qty: 90 CAPSULE | Refills: 2 | Status: SHIPPED | OUTPATIENT
Start: 2020-02-10 | End: 2020-12-03 | Stop reason: SDUPTHER

## 2020-08-25 ENCOUNTER — EMPLOYEE WELLNESS (OUTPATIENT)
Dept: OTHER | Age: 65
End: 2020-08-25

## 2020-08-25 LAB
CHOLESTEROL, TOTAL: 216 MG/DL (ref 0–199)
GLUCOSE BLD-MCNC: 86 MG/DL (ref 70–99)
HDLC SERPL-MCNC: 66 MG/DL (ref 40–59)
LDL CHOLESTEROL CALCULATED: 141 MG/DL (ref 0–129)
TRIGL SERPL-MCNC: 46 MG/DL (ref 0–150)

## 2020-09-22 ENCOUNTER — OFFICE VISIT (OUTPATIENT)
Dept: FAMILY MEDICINE CLINIC | Age: 65
End: 2020-09-22
Payer: COMMERCIAL

## 2020-09-22 VITALS
HEART RATE: 73 BPM | HEIGHT: 67 IN | RESPIRATION RATE: 16 BRPM | TEMPERATURE: 97.1 F | WEIGHT: 164 LBS | SYSTOLIC BLOOD PRESSURE: 132 MMHG | DIASTOLIC BLOOD PRESSURE: 76 MMHG | BODY MASS INDEX: 25.74 KG/M2 | OXYGEN SATURATION: 98 %

## 2020-09-22 PROCEDURE — 99397 PER PM REEVAL EST PAT 65+ YR: CPT | Performed by: FAMILY MEDICINE

## 2020-09-22 RX ORDER — VILAZODONE HYDROCHLORIDE 40 MG/1
40 TABLET ORAL NIGHTLY
COMMUNITY
End: 2021-09-23 | Stop reason: DRUGHIGH

## 2020-09-22 NOTE — PROGRESS NOTES
Subjective:       Naomi Friedman is a 76 y.o. female and is here for a comprehensive physical exam.  The patient reports problems - lifelong palpitations; grieving, stress and tearfulness related to 's sudden death d/t AML 7 months ago. Is treated for depression by Dr. Colt Dotson with [facility]. The 10-year ASCVD risk score (Renetta Ryder, et al., 2013) is: 6.4%    Values used to calculate the score:      Age: 76 years      Sex: Female      Is Non- : No      Diabetic: No      Tobacco smoker: No      Systolic Blood Pressure: 150 mmHg      Is BP treated: No      HDL Cholesterol: 66 mg/dL      Total Cholesterol: 216 mg/dL     History:  LMP: No LMP recorded. Patient has had a hysterectomy.   Menopause at unknown years  Last pap date: current  Abnormal pap? no    Past Medical History:   Diagnosis Date    Smith esophagus 2016, 2018    Dr Reshma Modi, surveillance q 3 years    Benign gastric polyp     DJD (degenerative joint disease) of knee     Fibromyalgia     GERD (gastroesophageal reflux disease) 2008    H/O bone density study 11/24/2012    osteopenia, was on Reclast    History of colonoscopy with polypectomy 2016, 2018    Dr Cristobal Lazo, tubulovillous adenoma, tubular adenoma, repeat 2023    History of vitamin D deficiency     Hyperlipidemia     Inconclusive mammogram 12/2018    repeat R breast in 6 months    Plantar fasciitis, bilateral     Reactive airway disease     PFT 2016     Patient Active Problem List    Diagnosis Date Noted    Hiatal hernia 04/16/2019    Non-seasonal allergic rhinitis 04/16/2019    PND (post-nasal drip) 01/31/2019    Expiratory wheezing 01/31/2019    Gastritis without bleeding     Polyp of colon     Gastric polyp     Smith's esophagus with dysplasia     Fibromyalgia     DJD (degenerative joint disease) of knee      Past Surgical History:   Procedure Laterality Date    ABLATION OF DYSRHYTHMIC FOCUS  1993    attempted ablation    Ernie 71 benign    CATARACT REMOVAL WITH IMPLANT Bilateral     Dr at the [address] [place] [address]  9/14/15    w/polypectomy     ENDOSCOPY, COLON, DIAGNOSTIC      HYSTERECTOMY, TOTAL ABDOMINAL      fibroids, endometriosis, age 26   [facility]      PARATHYROID GLAND SURGERY      R side    DC COLORECTAL SCRN; HI RISK IND N/A 10/1/2018    COLONOSCOPY performed by Oneil Pruett MD at . [facility] 61 ESOPHAGOGASTRODUODENOSCOPY TRANSORAL DIAGNOSTIC N/A 10/1/2018    EGD ESOPHAGOGASTRODUODENOSCOPY WITH DILATION performed by Oneil Pruett MD at [address]  9/14/15    w/bx,polypectomy      Family History   Problem Relation Age of Onset    Hypertension Mother    [facility] Sudden Death Mother         dec age 80    Alzheimer's Disease Father         dec age 80     Social History     Socioeconomic History    Marital status:      Spouse name: None    Number of children: 0    Years of education: None    Highest education level: None   Occupational History    Occupation: biometrics technologist, [place] [id number] Financial resource strain: None    Food insecurity     Worry: None     Inability: None    Transportation needs     Medical: None     Non-medical: None   Tobacco Use    Smoking status: Never Smoker    Smokeless tobacco: Never Used   Substance and Sexual Activity    Alcohol use: No    Drug use: No    Sexual activity: None   Lifestyle    Physical activity     Days per week: None     Minutes per session: None    Stress: None   Relationships    Social connections     Talks on phone: None     Gets together: None     Attends Christianity service: None     Active member of club or organization: None     Attends meetings of clubs or organizations: None     Relationship status: None    Intimate partner violence     Fear of current or ex partner: None     Emotionally abused: None Physically abused: None     Forced sexual activity: None   Other Topics Concern    None   Social History Narrative    Born in [place], one brother in the area, keeps in touch    , no children    Worked for [employer] x 41 years (2017)    Lives in a house in [place] with spouse    Hobbies reading, music, yard work, travel, shopping      Current Outpatient Medications   Medication Sig Dispense Refill    vilazodone HCl (VILAZODONE HCL) 40 MG TABS Take 40 mg by mouth nightly      omeprazole (PRILOSEC) 40 MG delayed release capsule Take 1 capsule by mouth every morning (before breakfast) 90 capsule 2    ranitidine (ZANTAC) 300 MG tablet Take 1 tablet by mouth nightly 90 tablet 3     No current facility-administered medications for this visit. Do you take any herbs or supplements that were not prescribed by a doctor? yes  Are you taking calcium supplements? no  Are you taking aspirin daily? not applicable    Review of Systems  Do you have pain that bothers you in your daily life? no  Pertinent items are noted in HPI.   Otherwise negative     Objective:      /76 (Site: Left Upper Arm, Position: Sitting, Cuff Size: Medium Adult)   Pulse 73   Temp 97.1 °F (36.2 °C) (Temporal)   Resp 16   Ht 5' 7\" (1.702 m)   Wt 164 lb (74.4 kg)   SpO2 98%   BMI 25.69 kg/m²   General appearance: alert, appears stated age and cooperative  Head: Normocephalic, without obvious abnormality, atraumatic  Eyes: negative  Ears: normal TM's and external ear canals both ears  Neck: no adenopathy, supple, symmetrical, trachea midline and thyroid not enlarged, symmetric, no tenderness/mass/nodules  Lungs: clear to auscultation bilaterally  Heart: regular rate and rhythm, S1, S2 normal, no murmur, click, rub or gallop  Abdomen: soft, non-tender; bowel sounds normal; no masses,  no organomegaly  Extremities: extremities normal, atraumatic, no cyanosis or edema  Skin: Skin color, texture, turgor normal. No rashes or lesions  Neurologic: Grossly normal      Assessment:      Healthy female exam. Continue MH care with Dr. Cosme Beckwith. Recommend grief counseling. Plan:      1. Recommend grief counseling. 2. Patient Counseling:  --Nutrition: Stressed importance of moderation in sodium/caffeine intake, saturated fat and cholesterol, caloric balance, sufficient intake of fresh fruits, vegetables, fiber, calcium,   --Discussed the issue of  calcium supplemen  --Exercise: Stressed the importance of regular exercise. --Injury prevention: Discussed safety belts, safety helmets, smoke detector,  --Dental health: Discussed importance of regular tooth brushing, flossing, and dental visits. --Immunizations reviewed.     3. Follow up in one year

## 2020-09-22 NOTE — PATIENT INSTRUCTIONS
On a 9 inch plate half should be fresh vegetables. If not fresh then frozen. One quarter of the plate should be protein like pork, chicken, fish, eggs. In one quarter of food plates should be healthy carbohydrates like fresh blueberries, blackberries, raspberries, strawberries. If not berries, then whole grains like brown rice or quinoa. Avoid foods that are white or light brown in color other than cauliflower, eggs, cheese. Avoid sweetened drinks. Avoid all juices. Avoid highly processed and fried foods.

## 2020-10-19 VITALS — BODY MASS INDEX: 25.37 KG/M2 | WEIGHT: 162 LBS

## 2020-12-03 RX ORDER — OMEPRAZOLE 40 MG/1
40 CAPSULE, DELAYED RELEASE ORAL
Qty: 90 CAPSULE | Refills: 2 | Status: SHIPPED | OUTPATIENT
Start: 2020-12-03 | End: 2021-09-02 | Stop reason: SDUPTHER

## 2020-12-03 NOTE — TELEPHONE ENCOUNTER
Requested Prescriptions     Pending Prescriptions Disp Refills    omeprazole (PRILOSEC) 40 MG delayed release capsule 90 capsule 2     Sig: Take 1 capsule by mouth every morning (before breakfast)

## 2021-06-24 LAB
CHOLESTEROL, TOTAL: 223 MG/DL (ref 0–199)
GLUCOSE BLD-MCNC: 85 MG/DL (ref 70–99)
HDLC SERPL-MCNC: 72 MG/DL (ref 40–59)
LDL CHOLESTEROL CALCULATED: 139 MG/DL (ref 0–129)
TRIGL SERPL-MCNC: 59 MG/DL (ref 0–150)

## 2021-09-02 RX ORDER — OMEPRAZOLE 40 MG/1
40 CAPSULE, DELAYED RELEASE ORAL
Qty: 90 CAPSULE | Refills: 2 | Status: SHIPPED | OUTPATIENT
Start: 2021-09-02 | End: 2022-06-02 | Stop reason: SDUPTHER

## 2021-09-23 ENCOUNTER — OFFICE VISIT (OUTPATIENT)
Dept: FAMILY MEDICINE CLINIC | Age: 66
End: 2021-09-23
Payer: COMMERCIAL

## 2021-09-23 VITALS
HEART RATE: 79 BPM | TEMPERATURE: 96.6 F | BODY MASS INDEX: 25.27 KG/M2 | HEIGHT: 67 IN | OXYGEN SATURATION: 98 % | RESPIRATION RATE: 15 BRPM | SYSTOLIC BLOOD PRESSURE: 128 MMHG | WEIGHT: 161 LBS | DIASTOLIC BLOOD PRESSURE: 66 MMHG

## 2021-09-23 DIAGNOSIS — Z00.00 HEALTH MAINTENANCE EXAMINATION: Primary | ICD-10-CM

## 2021-09-23 DIAGNOSIS — Z12.31 ENCOUNTER FOR SCREENING MAMMOGRAM FOR BREAST CANCER: ICD-10-CM

## 2021-09-23 DIAGNOSIS — K22.719 BARRETT'S ESOPHAGUS WITH DYSPLASIA: ICD-10-CM

## 2021-09-23 PROCEDURE — 99397 PER PM REEVAL EST PAT 65+ YR: CPT | Performed by: FAMILY MEDICINE

## 2021-09-23 RX ORDER — VILAZODONE HYDROCHLORIDE 20 MG/1
20 TABLET ORAL DAILY
COMMUNITY
End: 2022-09-26 | Stop reason: ALTCHOICE

## 2021-09-23 SDOH — ECONOMIC STABILITY: FOOD INSECURITY: WITHIN THE PAST 12 MONTHS, THE FOOD YOU BOUGHT JUST DIDN'T LAST AND YOU DIDN'T HAVE MONEY TO GET MORE.: NEVER TRUE

## 2021-09-23 SDOH — ECONOMIC STABILITY: TRANSPORTATION INSECURITY
IN THE PAST 12 MONTHS, HAS LACK OF TRANSPORTATION KEPT YOU FROM MEETINGS, WORK, OR FROM GETTING THINGS NEEDED FOR DAILY LIVING?: NO

## 2021-09-23 SDOH — ECONOMIC STABILITY: TRANSPORTATION INSECURITY
IN THE PAST 12 MONTHS, HAS THE LACK OF TRANSPORTATION KEPT YOU FROM MEDICAL APPOINTMENTS OR FROM GETTING MEDICATIONS?: NO

## 2021-09-23 SDOH — ECONOMIC STABILITY: FOOD INSECURITY: WITHIN THE PAST 12 MONTHS, YOU WORRIED THAT YOUR FOOD WOULD RUN OUT BEFORE YOU GOT MONEY TO BUY MORE.: NEVER TRUE

## 2021-09-23 ASSESSMENT — PATIENT HEALTH QUESTIONNAIRE - PHQ9
SUM OF ALL RESPONSES TO PHQ QUESTIONS 1-9: 2
2. FEELING DOWN, DEPRESSED OR HOPELESS: 1
SUM OF ALL RESPONSES TO PHQ9 QUESTIONS 1 & 2: 2
SUM OF ALL RESPONSES TO PHQ QUESTIONS 1-9: 2
1. LITTLE INTEREST OR PLEASURE IN DOING THINGS: 1
SUM OF ALL RESPONSES TO PHQ QUESTIONS 1-9: 2

## 2021-09-23 ASSESSMENT — SOCIAL DETERMINANTS OF HEALTH (SDOH): HOW HARD IS IT FOR YOU TO PAY FOR THE VERY BASICS LIKE FOOD, HOUSING, MEDICAL CARE, AND HEATING?: NOT HARD AT ALL

## 2021-10-08 ENCOUNTER — TELEPHONE (OUTPATIENT)
Dept: FAMILY MEDICINE CLINIC | Age: 66
End: 2021-10-08

## 2021-11-16 ENCOUNTER — OFFICE VISIT (OUTPATIENT)
Dept: GASTROENTEROLOGY | Age: 66
End: 2021-11-16
Payer: COMMERCIAL

## 2021-11-16 VITALS — OXYGEN SATURATION: 100 % | BODY MASS INDEX: 24.96 KG/M2 | WEIGHT: 159 LBS | HEART RATE: 74 BPM | HEIGHT: 67 IN

## 2021-11-16 DIAGNOSIS — K22.70 BARRETT'S ESOPHAGUS WITHOUT DYSPLASIA: ICD-10-CM

## 2021-11-16 DIAGNOSIS — K44.9 HIATAL HERNIA: ICD-10-CM

## 2021-11-16 DIAGNOSIS — K21.9 GASTROESOPHAGEAL REFLUX DISEASE, UNSPECIFIED WHETHER ESOPHAGITIS PRESENT: Primary | ICD-10-CM

## 2021-11-16 PROCEDURE — 99214 OFFICE O/P EST MOD 30 MIN: CPT | Performed by: INTERNAL MEDICINE

## 2021-11-16 NOTE — PROGRESS NOTES
weight 159 lb (72.1 kg), SpO2 100 %, not currently breastfeeding. Physical Exam  Constitutional:       General: She is not in acute distress. Appearance: Normal appearance. She is well-developed. Eyes:      General: No scleral icterus. Cardiovascular:      Rate and Rhythm: Normal rate and regular rhythm. Pulmonary:      Effort: Pulmonary effort is normal.      Breath sounds: Normal breath sounds. Abdominal:      General: Bowel sounds are normal. There is no distension. Palpations: Abdomen is soft. There is no mass. Tenderness: There is no abdominal tenderness. There is no guarding or rebound. Musculoskeletal:         General: Normal range of motion. Lymphadenopathy:      Cervical: No cervical adenopathy. Neurological:      Mental Status: She is alert and oriented to person, place, and time. Psychiatric:         Behavior: Behavior normal.         Thought Content: Thought content normal.         Judgment: Judgment normal.       Laboratory, Pathology, Radiology reviewed in detail with relevantimportant investigations summarized below:    No results for input(s): WBC, HGB, HCT, MCV, PLT in the last 720 hours. Lab Results   Component Value Date    ALT 12 11/05/2015    AST 12 11/05/2015    ALKPHOS 100 11/05/2015    BILITOT 0.3 11/05/2015     Assessment and Plan:  Sylvester Vann 78 y.o. female  1. Gastroesophageal reflux disease, unspecified whether esophagitis present  2. Hiatal hernia  3. Smith's esophagus without dysplasia  -Continue with omeprazole at current dose  - Anti-reflux measures discussed at length  - EGD for Smith's surveillance. (Procedure risks and instruction discussed). Return if symptoms worsen or fail to improve.     Cosme Cuba MD   StaffGastroenterologist  [facility]    Please note this report has been partially produced using speech recognition software and may cause/contain errors related to that system including grammar, punctuation and spelling as well as words andphrases that may seem inappropriate. If there are questions or concerns please feel free to contact me to clarify.

## 2021-12-23 ENCOUNTER — ANESTHESIA EVENT (OUTPATIENT)
Dept: ENDOSCOPY | Age: 66
End: 2021-12-23
Payer: COMMERCIAL

## 2021-12-23 ENCOUNTER — ANCILLARY PROCEDURE (OUTPATIENT)
Dept: ENDOSCOPY | Age: 66
End: 2021-12-23
Attending: INTERNAL MEDICINE
Payer: COMMERCIAL

## 2021-12-23 ENCOUNTER — HOSPITAL ENCOUNTER (OUTPATIENT)
Age: 66
Setting detail: OUTPATIENT SURGERY
Discharge: HOME | End: 2021-12-23
Attending: INTERNAL MEDICINE | Admitting: INTERNAL MEDICINE
Payer: COMMERCIAL

## 2021-12-23 ENCOUNTER — ANESTHESIA (OUTPATIENT)
Dept: ENDOSCOPY | Age: 66
End: 2021-12-23
Payer: COMMERCIAL

## 2021-12-23 VITALS
RESPIRATION RATE: 7 BRPM | DIASTOLIC BLOOD PRESSURE: 77 MMHG | OXYGEN SATURATION: 100 % | SYSTOLIC BLOOD PRESSURE: 155 MMHG

## 2021-12-23 VITALS
SYSTOLIC BLOOD PRESSURE: 156 MMHG | RESPIRATION RATE: 18 BRPM | BODY MASS INDEX: 24.8 KG/M2 | DIASTOLIC BLOOD PRESSURE: 75 MMHG | HEIGHT: 67 IN | WEIGHT: 158 LBS | HEART RATE: 81 BPM | TEMPERATURE: 97.1 F | OXYGEN SATURATION: 100 %

## 2021-12-23 PROCEDURE — 3700000000 HC ANESTHESIA ATTENDED CARE: Performed by: INTERNAL MEDICINE

## 2021-12-23 PROCEDURE — 2580000003 HC RX 258: Performed by: INTERNAL MEDICINE

## 2021-12-23 PROCEDURE — 3700000001 HC ADD 15 MINUTES (ANESTHESIA): Performed by: INTERNAL MEDICINE

## 2021-12-23 PROCEDURE — 3609017100 HC EGD: Performed by: INTERNAL MEDICINE

## 2021-12-23 PROCEDURE — 2709999900 HC NON-CHARGEABLE SUPPLY: Performed by: INTERNAL MEDICINE

## 2021-12-23 PROCEDURE — 88313 SPECIAL STAINS GROUP 2: CPT

## 2021-12-23 PROCEDURE — 2500000003 HC RX 250 WO HCPCS: Performed by: REGISTERED NURSE

## 2021-12-23 PROCEDURE — 6370000000 HC RX 637 (ALT 250 FOR IP): Performed by: INTERNAL MEDICINE

## 2021-12-23 PROCEDURE — 6360000002 HC RX W HCPCS: Performed by: REGISTERED NURSE

## 2021-12-23 PROCEDURE — 88305 TISSUE EXAM BY PATHOLOGIST: CPT

## 2021-12-23 PROCEDURE — 43239 EGD BIOPSY SINGLE/MULTIPLE: CPT | Performed by: INTERNAL MEDICINE

## 2021-12-23 PROCEDURE — 7100000010 HC PHASE II RECOVERY - FIRST 15 MIN: Performed by: INTERNAL MEDICINE

## 2021-12-23 PROCEDURE — 2580000003 HC RX 258

## 2021-12-23 RX ORDER — SIMETHICONE 20 MG/.3ML
EMULSION ORAL
Status: DISCONTINUED
Start: 2021-12-23 | End: 2021-12-23 | Stop reason: HOSPADM

## 2021-12-23 RX ORDER — SODIUM CHLORIDE 9 MG/ML
INJECTION, SOLUTION INTRAVENOUS CONTINUOUS
Status: DISCONTINUED | OUTPATIENT
Start: 2021-12-23 | End: 2021-12-23 | Stop reason: HOSPADM

## 2021-12-23 RX ORDER — PROPOFOL 10 MG/ML
INJECTION, EMULSION INTRAVENOUS PRN
Status: DISCONTINUED | OUTPATIENT
Start: 2021-12-23 | End: 2021-12-23 | Stop reason: SDUPTHER

## 2021-12-23 RX ORDER — MAGNESIUM HYDROXIDE 1200 MG/15ML
LIQUID ORAL PRN
Status: DISCONTINUED | OUTPATIENT
Start: 2021-12-23 | End: 2021-12-23 | Stop reason: ALTCHOICE

## 2021-12-23 RX ORDER — LIDOCAINE HYDROCHLORIDE 10 MG/ML
INJECTION, SOLUTION EPIDURAL; INFILTRATION; INTRACAUDAL; PERINEURAL PRN
Status: DISCONTINUED | OUTPATIENT
Start: 2021-12-23 | End: 2021-12-23 | Stop reason: SDUPTHER

## 2021-12-23 RX ORDER — GLYCOPYRROLATE 1 MG/5 ML
SYRINGE (ML) INTRAVENOUS PRN
Status: DISCONTINUED | OUTPATIENT
Start: 2021-12-23 | End: 2021-12-23 | Stop reason: SDUPTHER

## 2021-12-23 RX ORDER — SIMETHICONE 20 MG/.3ML
EMULSION ORAL PRN
Status: DISCONTINUED | OUTPATIENT
Start: 2021-12-23 | End: 2021-12-23 | Stop reason: ALTCHOICE

## 2021-12-23 RX ORDER — SODIUM CHLORIDE 9 MG/ML
INJECTION, SOLUTION INTRAVENOUS
Status: COMPLETED
Start: 2021-12-23 | End: 2021-12-23

## 2021-12-23 RX ADMIN — PROPOFOL 50 MG: 10 INJECTION, EMULSION INTRAVENOUS at 07:42

## 2021-12-23 RX ADMIN — LIDOCAINE HYDROCHLORIDE 50 MG: 10 INJECTION, SOLUTION EPIDURAL; INFILTRATION; INTRACAUDAL; PERINEURAL at 07:36

## 2021-12-23 RX ADMIN — SODIUM CHLORIDE: 9 INJECTION, SOLUTION INTRAVENOUS at 06:59

## 2021-12-23 RX ADMIN — Medication 0.4 MG: at 07:36

## 2021-12-23 RX ADMIN — PROPOFOL 100 MG: 10 INJECTION, EMULSION INTRAVENOUS at 07:36

## 2021-12-23 ASSESSMENT — PULMONARY FUNCTION TESTS
PIF_VALUE: 1

## 2021-12-23 ASSESSMENT — PAIN - FUNCTIONAL ASSESSMENT: PAIN_FUNCTIONAL_ASSESSMENT: 0-10

## 2021-12-23 NOTE — H&P
Patient Name: Yodit Ashton  : 1955  MRN: [id number]  DATE: 21      ENDOSCOPY  History and Physical    Procedure:    [] Diagnostic Colonoscopy       [] Screening Colonoscopy  [x] EGD      [] ERCP      [] EUS       [] Other    [x] Previous office notes/History and Physical reviewed from the patients chart. Please see EMR for further details of HPI. I have examined the patient's status immediately prior to the procedure and:      Indications/HPI:    []Abdominal Pain   []Cancer- GI/Lung  []Fhx of colon CA  []History of Polyps   [x]Smiths   []Melena  []Abnormal Imaging   []Dysphagia    []Persistent Pneumonia  []Anemia   []Food Impaction  []History of Polyps  []GI Bleed   []Pulmonary nodule/Mass  []Change in bowel habits  []Heartburn/Reflux  []Rectal Bleed (BRBPR)  []Chest Pain - Non Cardiac  []Heme (+) Stool  []Ulcers  []Constipation   []Hemoptysis   []Varices  []Diarrhea   []Hypoxemia  []Nausea/Vomiting   []Screening   []Crohns/Colitis  []Other:    Anesthesia:   [x] MAC [] Moderate Sedation   [] General   [] None     ROS: 12 pt Review of Symptoms was negative unless mentioned above    Medications:   Prior to Admission medications    Medication Sig Start Date End Date Taking? Authorizing Provider   vilazodone HCl (VIIBRYD) 20 MG TABS Take 20 mg by mouth daily   Yes Historical Provider, MD   omeprazole (PRILOSEC) 40 MG delayed release capsule Take 1 capsule by mouth every morning (before breakfast) 21  Yes Daniela Farris, APRN - CNP       Allergies:    Allergies   Allergen Reactions    Codeine Nausea And Vomiting    Diazepam      Other reaction(s): Mental Status Change    Tape [Adhesive Tape] Rash    Valium Anxiety        History of allergic reaction to anesthesia:  No    Past Medical History:  Past Medical History:   Diagnosis Date    Smith esophagus 2016, 2018    Dr Joanne Eaton, surveillance q 3 years    Benign gastric polyp     DJD (degenerative joint disease) of knee     Fibromyalgia     if they cannot consent, their representative. Assessment:  Patient examined and appropriate for planned sedation and procedure. Plan:  Proceed with planned sedation and procedure as above. The patient was counseled at length about risks of juma COVID-19 in the perioperative and any recovery window from the procedure. The patient was made aware that juma COVID-19 may worsen their prognosis for recovery from their procedure and lend to a higher morbidity and-all mortality risk. The patient was given the option of postponing the procedure all material risks, benefits, and alternatives were discussed. The patient does wish to proceed with the procedure at this time.     Pernell White MD  7:37 AM

## 2021-12-23 NOTE — ANESTHESIA POSTPROCEDURE EVALUATION
Department of Anesthesiology  Postprocedure Note    Patient: Jaqui Justin  MRN: [id number]  YOB: 1955  Date of evaluation: 12/23/2021  Time:  7:48 AM     Procedure Summary     Date: 12/23/21 Room / Location: [address] [place] [address]    Anesthesia Start: 8742 Anesthesia Stop: 5050    Procedure: EGD ESOPHAGOGASTRODUODENOSCOPY (N/A ) Diagnosis: (GERD, hiatal hernia, Smith's esophagus without dysplasia  K21.9, K44.9, K22.70)    Surgeons: Hunter Lund MD Responsible Provider: CORNELIUS Humphreys CRNA    Anesthesia Type: MAC ASA Status: 2          Anesthesia Type: MAC    Nisha Phase I:      Nisha Phase II:      Last vitals: Reviewed and per EMR flowsheets.        Anesthesia Post Evaluation    Patient location during evaluation: bedside  Patient participation: complete - patient participated  Level of consciousness: awake and awake and alert  Pain score: 0  Airway patency: patent  Nausea & Vomiting: no nausea and no vomiting  Complications: no  Cardiovascular status: blood pressure returned to baseline and hemodynamically stable  Respiratory status: acceptable  Hydration status: euvolemic

## 2021-12-23 NOTE — ANESTHESIA PRE PROCEDURE
Department of Anesthesiology  Preprocedure Note       Name:  Sharman Dancer   Age:  78 y.o.  :  1955                                          MRN:  [id number]         Date:  2021      Surgeon: Quynh Persaud):  Lacy Trujillo MD    Procedure: Procedure(s):  EGD ESOPHAGOGASTRODUODENOSCOPY    Medications prior to admission:   Prior to Admission medications    Medication Sig Start Date End Date Taking? Authorizing Provider   vilazodone HCl (VIIBRYD) 20 MG TABS Take 20 mg by mouth daily   Yes Historical Provider, MD   omeprazole (PRILOSEC) 40 MG delayed release capsule Take 1 capsule by mouth every morning (before breakfast) 21  Yes CORNELIUS Richardson CNP       Current medications:    Current Facility-Administered Medications   Medication Dose Route Frequency Provider Last Rate Last Admin    0.9 % sodium chloride infusion   IntraVENous Continuous Lacy Trujillo  mL/hr at 21 0659 New Bag at 21 0659       Allergies:     Allergies   Allergen Reactions    Codeine Nausea And Vomiting    Diazepam      Other reaction(s): Mental Status Change    Tape Doni Tera Tape] Rash    Valium Anxiety       Problem List:    Patient Active Problem List   Diagnosis Code    Fibromyalgia M79.7    DJD (degenerative joint disease) of knee M17.10    Gastritis without bleeding K29.70    Polyp of colon K63.5    Gastric polyp K31.7    Smith's esophagus with dysplasia K22.719    PND (post-nasal drip) R09.82    Expiratory wheezing R06.2    Hiatal hernia K44.9    Non-seasonal allergic rhinitis J30.89       Past Medical History:        Diagnosis Date    Smith esophagus 2018    Dr Jordan Lopez, surveillance q 3 years    Benign gastric polyp     DJD (degenerative joint disease) of knee     Fibromyalgia     GERD (gastroesophageal reflux disease)     H/O bone density study 2012    osteopenia, was on Reclast    History of colonoscopy with polypectomy 2018    Dr Ciarra James, tubulovillous adenoma, tubular adenoma, repeat 2023    History of vitamin D deficiency     Hyperlipidemia     Inconclusive mammogram 12/2018    repeat R breast in 6 months    Plantar fasciitis, bilateral     Reactive airway disease     PFT 2016       Past Surgical History:        Procedure Laterality Date    ABLATION OF DYSRHYTHMIC FOCUS  1993    attempted ablation     BREAST BIOPSY  1990    benign    CATARACT REMOVAL WITH IMPLANT Bilateral     Dr at the [address] [place] [address]  9/14/15    w/polypectomy     ENDOSCOPY, COLON, DIAGNOSTIC      HYSTERECTOMY, TOTAL ABDOMINAL      fibroids, endometriosis, age 26   [facility]      [facility]    TX COLORECTAL SCRN; HI RISK IND N/A 10/1/2018    COLONOSCOPY performed by Haleigh Andrews MD at [facility] 60 ESOPHAGOGASTRODUODENOSCOPY TRANSORAL DIAGNOSTIC N/A 10/1/2018    EGD ESOPHAGOGASTRODUODENOSCOPY WITH DILATION performed by Haleigh Andrews MD at [address]  9/14/15    w/bx,polypectomy        Social History:    Social History     Tobacco Use    Smoking status: Never Smoker    Smokeless tobacco: Never Used   Substance Use Topics    Alcohol use:  No                                Counseling given: Not Answered      Vital Signs (Current):   Vitals:    12/23/21 0652   BP: (!) 169/74   Pulse: 85   Resp: 16   Temp: 36.2 °C (97.1 °F)   TempSrc: Temporal   SpO2: 96%   Weight: 158 lb (71.7 kg)   Height: 5' 7\" (1.702 m)                                              BP Readings from Last 3 Encounters:   12/23/21 (!) 169/74   09/23/21 128/66   09/22/20 132/76       NPO Status: Time of last liquid consumption: 2320                        Time of last solid consumption: 2100                        Date of last liquid consumption: 12/22/21                        Date of last solid food consumption: 12/22/21    BMI:   Wt Readings from Last 3 Encounters:   12/23/21 158 lb (71.7 kg)   11/16/21 159 lb (72.1 kg)   09/23/21 161 lb (73 kg)     Body mass index is 24.75 kg/m². CBC:   Lab Results   Component Value Date    WBC 10.9 12/21/2013    RBC 4.55 12/21/2013    HGB 13.9 12/21/2013    HCT 41.6 12/21/2013    MCV 91.5 12/21/2013    RDW 15.5 12/21/2013     12/21/2013       CMP:   Lab Results   Component Value Date     11/05/2015    K 4.9 11/05/2015     11/05/2015    CO2 24 11/05/2015    BUN 16 11/05/2015    CREATININE 0.55 11/05/2015    GFRAA >60.0 11/05/2015    LABGLOM >60.0 11/05/2015    GLUCOSE 85 06/24/2021    PROT 6.7 11/05/2015    CALCIUM 9.9 11/05/2015    BILITOT 0.3 11/05/2015    ALKPHOS 100 11/05/2015    AST 12 11/05/2015    ALT 12 11/05/2015       POC Tests: No results for input(s): POCGLU, POCNA, POCK, POCCL, POCBUN, POCHEMO, POCHCT in the last 72 hours.     Coags: No results found for: PROTIME, INR, APTT    HCG (If Applicable): No results found for: PREGTESTUR, PREGSERUM, HCG, HCGQUANT     ABGs: No results found for: PHART, PO2ART, AXR0KFA, LCM6EDJ, BEART, K6UXPANE     Type & Screen (If Applicable):  No results found for: LABABO, LABRH    Drug/Infectious Status (If Applicable):  No results found for: HIV, HEPCAB    COVID-19 Screening (If Applicable): No results found for: COVID19        Anesthesia Evaluation  Patient summary reviewed and Nursing notes reviewed no history of anesthetic complications:   Airway: Mallampati: II  TM distance: >3 FB   Neck ROM: full  Mouth opening: > = 3 FB Dental: normal exam         Pulmonary:Negative Pulmonary ROS and normal exam  breath sounds clear to auscultation                             Cardiovascular:Negative CV ROS  Exercise tolerance: good (>4 METS),         ECG reviewed  Rhythm: regular  Rate: normal           Beta Blocker:  Not on Beta Blocker         Neuro/Psych:   Negative Neuro/Psych ROS  (+) neuromuscular disease:,             GI/Hepatic/Renal: Neg GI/Hepatic/Renal ROS  (+) hiatal hernia, GERD: no interval change,           Endo/Other: Negative Endo/Other ROS             Pt had PAT visit. Abdominal:             Vascular: negative vascular ROS. Other Findings:             Anesthesia Plan      MAC     ASA 2       Induction: intravenous.                           CORNELIUS Childs - NASRIN   12/23/2021

## 2022-01-03 ENCOUNTER — VIRTUAL VISIT (OUTPATIENT)
Dept: FAMILY MEDICINE CLINIC | Age: 67
End: 2022-01-03
Payer: COMMERCIAL

## 2022-01-03 DIAGNOSIS — U07.1 COVID-19: Primary | ICD-10-CM

## 2022-01-03 PROCEDURE — 99422 OL DIG E/M SVC 11-20 MIN: CPT | Performed by: INTERNAL MEDICINE

## 2022-01-03 ASSESSMENT — ENCOUNTER SYMPTOMS
VOMITING: 0
SHORTNESS OF BREATH: 0
CHEST TIGHTNESS: 0
WHEEZING: 0
COUGH: 1
NAUSEA: 0

## 2022-01-03 NOTE — LETTER
NOTIFICATION RETURN TO WORK / SCHOOL    1/3/2022    Ms. Maritza Davis  [address] [id number]      To Whom It May Concern:    Maritza Davis was tested for COVID-19 on 12/31, and the result was positive. She may return to work on 1/6. I recommend:return without restrictions    If there are questions or concerns, please have the patient contact our office.         Sincerely,      Yasmany Morton MD

## 2022-01-03 NOTE — PROGRESS NOTES
Subjective:      Patient ID: Jay Mead is a 78 y.o. female who presents today for:  Chief Complaint   Patient presents with    Other     patient needs letter for [facility] to come back to work on 01/05    Cough    Chills       HPI   Patient being seen for follow up for COVID infection. Tested positive for COVID on 12/30. Reports improvement in initial symptoms but still endorses a  persisting dry cough. No associated chest pain or SOB. Has been afebrile for > 24 hours. Patient was scheduled to return to work today but had this deferred due to persisting symptoms. Requesting clearance to return to work. Offers no additional medical complaints at this time.     Past Medical History:   Diagnosis Date    Smith esophagus 2016, 2018    Dr Sabas Byrd, surveillance q 3 years    Benign gastric polyp     DJD (degenerative joint disease) of knee     Fibromyalgia     GERD (gastroesophageal reflux disease) 2008    H/O bone density study 11/24/2012    osteopenia, was on Reclast    History of colonoscopy with polypectomy 2016, 2018    Dr Luigi Herrmann, tubulovillous adenoma, tubular adenoma, repeat 2023    History of vitamin D deficiency     Hyperlipidemia     Inconclusive mammogram 12/2018    repeat R breast in 6 months    Plantar fasciitis, bilateral     Reactive airway disease     PFT 2016     Past Surgical History:   Procedure Laterality Date    ABLATION OF DYSRHYTHMIC FOCUS  1993    attempted ablation     BREAST BIOPSY  1990    benign    CATARACT REMOVAL WITH IMPLANT Bilateral     Dr at the [address] [place] [address]  9/14/15    w/polypectomy     ENDOSCOPY, COLON, DIAGNOSTIC      HYSTERECTOMY, TOTAL ABDOMINAL      fibroids, endometriosis, age 26   [address] [facility]      R side     Pelon Baer; HI RISK IND N/A 10/1/2018    COLONOSCOPY performed by Kenroy Olmos MD at [facility] 60 ESOPHAGOGASTRODUODENOSCOPY TRANSORAL DIAGNOSTIC N/A 10/1/2018    EGD ESOPHAGOGASTRODUODENOSCOPY WITH DILATION performed by Farrukh Garcia MD at [place] [address] ENDOSCOPY  9/14/15    w/bx,polypectomy     UPPER GASTROINTESTINAL ENDOSCOPY N/A 12/23/2021    EGD ESOPHAGOGASTRODUODENOSCOPY performed by Alexus Tanner MD at [facility]     Family History   Problem Relation Age of Onset    Hypertension Mother    Shalini Manual Sudden Death Mother         dec age 80    Alzheimer's Disease Father         dec age 80   Shalini Manual Colon Cancer Neg Hx      Allergies   Allergen Reactions    Codeine Nausea And Vomiting    Diazepam      Other reaction(s): Mental Status Change    Tape Lajune Gitelman Tape] Rash    Valium Anxiety     Current Outpatient Medications on File Prior to Visit   Medication Sig Dispense Refill    vilazodone HCl (VIIBRYD) 20 MG TABS Take 20 mg by mouth daily      omeprazole (PRILOSEC) 40 MG delayed release capsule Take 1 capsule by mouth every morning (before breakfast) 90 capsule 2     No current facility-administered medications on file prior to visit. Review of Systems   Constitutional: Negative for activity change, chills, diaphoresis, fatigue and fever. Respiratory: Positive for cough (dry). Negative for chest tightness, shortness of breath and wheezing. Cardiovascular: Negative for chest pain, palpitations and leg swelling. Gastrointestinal: Negative for nausea and vomiting. Neurological: Negative for dizziness, syncope, light-headedness and headaches. Objective: There were no vitals taken for this visit. Physical Exam  N/A- Audio Encounter. Assessment:      Cristina Cardozo was seen today for follow up for COVID infection. Diagnoses and all orders for this visit:    COVID-19  -   F/u for COVID infection. Tested positive on 12/30.  -  Reports improvement in initial symptoms but endorses a  persisting dry cough. No associated chest pain or SOB.  Has been afebrile for > 24 hours.  -  Continue DM cough syrup 10mls q 6 prn for cough/congestion.  -  Tylenol 650 mg q4-6 prn for chills, headaches, body aches  -  Counseled on adequate hydration.  -  Requesting clearance for return to work; cleared to return to work on 1/6/22 as still symptomatic. Health Maintenance   Topic Date Due    DTaP/Tdap/Td vaccine (1 - Tdap) Never done    Shingles Vaccine (1 of 2) Never done    Pneumococcal 65+ years Vaccine (1 of 1 - PPSV23) Never done    Breast cancer screen  12/22/2020    Flu vaccine (1) 09/01/2021    COVID-19 Vaccine (3 - Booster for Pfizer series) 01/13/2022    Depression Screen  09/23/2022    Colon cancer screen colonoscopy  10/01/2023    Lipid screen  06/24/2026    DEXA (modify frequency per FRAX score)  Completed    Hepatitis A vaccine  Aged Out    Hepatitis B vaccine  Aged Out    Hib vaccine  Aged Out    Meningococcal (ACWY) vaccine  Aged Out    Hepatitis C screen  Discontinued            Plan:    As above. No orders of the defined types were placed in this encounter. No orders of the defined types were placed in this encounter. No follow-ups on file. On this date 1/3/2022 I have spent 15 minutes reviewing previous notes, test results and conducting a virtual encounter with the patient discussing the diagnosis and importance of compliance with the treatment plan as well as documenting on the day of the visit. Fatuma Obregon was evaluated through a synchronous (real-time) audio encounter. The patient (or guardian if applicable) is aware that this is a billable service. Verbal consent to proceed has been obtained within the past 12 months. The visit was conducted pursuant to the emergency declaration under the [facility], [address] [place] [address] [facility] authority and the [employer] General Act. Patient identification was verified, and a caregiver was present when appropriate.  The patient was located in a state where the provider was credentialed to provide care.     Kyaw Walker MD

## 2022-05-06 LAB
CHOLESTEROL, TOTAL: 211 MG/DL (ref 0–199)
GLUCOSE BLD-MCNC: 91 MG/DL (ref 70–99)
HDLC SERPL-MCNC: 63 MG/DL (ref 40–59)
LDL CHOLESTEROL CALCULATED: 132 MG/DL (ref 0–129)
TRIGL SERPL-MCNC: 78 MG/DL (ref 0–150)

## 2022-06-02 RX ORDER — OMEPRAZOLE 40 MG/1
40 CAPSULE, DELAYED RELEASE ORAL
Qty: 90 CAPSULE | Refills: 2 | Status: SHIPPED | OUTPATIENT
Start: 2022-06-02

## 2022-06-03 RX ORDER — OMEPRAZOLE 40 MG/1
CAPSULE, DELAYED RELEASE ORAL
Qty: 90 CAPSULE | Refills: 2 | OUTPATIENT
Start: 2022-06-03

## 2022-09-26 ENCOUNTER — OFFICE VISIT (OUTPATIENT)
Dept: FAMILY MEDICINE CLINIC | Age: 67
End: 2022-09-26
Payer: COMMERCIAL

## 2022-09-26 VITALS
HEIGHT: 67 IN | HEART RATE: 100 BPM | DIASTOLIC BLOOD PRESSURE: 78 MMHG | BODY MASS INDEX: 25.43 KG/M2 | TEMPERATURE: 96.7 F | WEIGHT: 162 LBS | OXYGEN SATURATION: 96 % | SYSTOLIC BLOOD PRESSURE: 138 MMHG

## 2022-09-26 DIAGNOSIS — Z23 NEED FOR PROPHYLACTIC VACCINATION AGAINST STREPTOCOCCUS PNEUMONIAE (PNEUMOCOCCUS): ICD-10-CM

## 2022-09-26 DIAGNOSIS — Z23 NEED FOR PROPHYLACTIC VACCINATION AND INOCULATION AGAINST VARICELLA: ICD-10-CM

## 2022-09-26 DIAGNOSIS — Z00.00 ENCOUNTER FOR WELL ADULT EXAM WITHOUT ABNORMAL FINDINGS: Primary | ICD-10-CM

## 2022-09-26 DIAGNOSIS — Z12.31 ENCOUNTER FOR SCREENING MAMMOGRAM FOR BREAST CANCER: ICD-10-CM

## 2022-09-26 DIAGNOSIS — Z23 NEED FOR INFLUENZA VACCINATION: ICD-10-CM

## 2022-09-26 DIAGNOSIS — Z23 NEED FOR TDAP VACCINATION: ICD-10-CM

## 2022-09-26 DIAGNOSIS — Z91.81 AT HIGH RISK FOR FALLS: ICD-10-CM

## 2022-09-26 PROCEDURE — 90677 PCV20 VACCINE IM: CPT | Performed by: FAMILY MEDICINE

## 2022-09-26 PROCEDURE — 90471 IMMUNIZATION ADMIN: CPT | Performed by: FAMILY MEDICINE

## 2022-09-26 PROCEDURE — 90715 TDAP VACCINE 7 YRS/> IM: CPT | Performed by: FAMILY MEDICINE

## 2022-09-26 PROCEDURE — 99397 PER PM REEVAL EST PAT 65+ YR: CPT | Performed by: FAMILY MEDICINE

## 2022-09-26 PROCEDURE — 90472 IMMUNIZATION ADMIN EACH ADD: CPT | Performed by: FAMILY MEDICINE

## 2022-09-26 RX ORDER — ZOSTER VACCINE RECOMBINANT, ADJUVANTED 50 MCG/0.5
0.5 KIT INTRAMUSCULAR ONCE
Qty: 1 EACH | Refills: 0 | Status: SHIPPED | OUTPATIENT
Start: 2022-09-26 | End: 2022-09-26

## 2022-09-26 RX ORDER — ZOSTER VACCINE RECOMBINANT, ADJUVANTED 50 MCG/0.5
0.5 KIT INTRAMUSCULAR SEE ADMIN INSTRUCTIONS
Qty: 0.5 ML | Refills: 0 | Status: CANCELLED | OUTPATIENT
Start: 2022-09-26 | End: 2022-09-27

## 2022-09-26 RX ORDER — ACETAMINOPHEN 160 MG
TABLET,DISINTEGRATING ORAL
Qty: 90 CAPSULE | Refills: 3 | Status: SHIPPED | OUTPATIENT
Start: 2022-09-26

## 2022-09-26 SDOH — ECONOMIC STABILITY: FOOD INSECURITY: WITHIN THE PAST 12 MONTHS, YOU WORRIED THAT YOUR FOOD WOULD RUN OUT BEFORE YOU GOT MONEY TO BUY MORE.: NEVER TRUE

## 2022-09-26 SDOH — ECONOMIC STABILITY: FOOD INSECURITY: WITHIN THE PAST 12 MONTHS, THE FOOD YOU BOUGHT JUST DIDN'T LAST AND YOU DIDN'T HAVE MONEY TO GET MORE.: NEVER TRUE

## 2022-09-26 ASSESSMENT — PATIENT HEALTH QUESTIONNAIRE - PHQ9
SUM OF ALL RESPONSES TO PHQ9 QUESTIONS 1 & 2: 2
2. FEELING DOWN, DEPRESSED OR HOPELESS: 1
SUM OF ALL RESPONSES TO PHQ QUESTIONS 1-9: 2
1. LITTLE INTEREST OR PLEASURE IN DOING THINGS: 1
SUM OF ALL RESPONSES TO PHQ QUESTIONS 1-9: 2

## 2022-09-26 ASSESSMENT — SOCIAL DETERMINANTS OF HEALTH (SDOH): HOW HARD IS IT FOR YOU TO PAY FOR THE VERY BASICS LIKE FOOD, HOUSING, MEDICAL CARE, AND HEATING?: NOT HARD AT ALL

## 2022-09-26 NOTE — PATIENT INSTRUCTIONS
Advance Directives: Care Instructions  Overview  An advance directive is a legal way to state your wishes at the end of your life. It tells your family and your doctor what to do if you can't say what you want. There are two main types of advance directives. You can change them any time your wishes change. Living will. This form tells your family and your doctor your wishes about life support and other treatment. The form is also called a declaration. Medical power of . This form lets you name a person to make treatment decisions for you when you can't speak for yourself. This person is called a health care agent (health care proxy, health care surrogate). The form is also called a durable power of  for health care. If you do not have an advance directive, decisions about your medical care may be made by a family member, or by a doctor or a  who doesn't know you. It may help to think of an advance directive as a gift to the people who care for you. If you have one, they won't have to make tough decisions by themselves. Follow-up care is a key part of your treatment and safety. Be sure to make and go to all appointments, and call your doctor if you are having problems. It's also a good idea to know your test results and keep a list of the medicines you take. What should you include in an advance directive? Many states have a unique advance directive form. (It may ask you to address specific issues.) Or you might use a universal form that's approved by many states. If your form doesn't tell you what to address, it may be hard to know what to include in your advance directive. Use the questions below to help you get started. Who do you want to make decisions about your medical care if you are not able to? What life-support measures do you want if you have a serious illness that gets worse over time or can't be cured? What are you most afraid of that might happen?  (Maybe you're afraid of having pain, losing your independence, or being kept alive by machines.)  Where would you prefer to die? (Your home? A hospital? A nursing home?)  Do you want to donate your organs when you die? Do you want certain Judaism practices performed before you die? When should you call for help? Be sure to contact your doctor if you have any questions. Where can you learn more? Go to [email or web address]://[email or web address] org and sign in to your [employer] account. Enter R264 in the gBox box to learn more about \"Advance Directives: Care Instructions. \"     If you do not have an account, please click on the \"Sign Up Now\" link. Current as of: June 16, 2022               Content Version: 13.4  © 1540-7913 [employer]. Care instructions adapted under license by [employer]. If you have questions about a medical condition or this instruction, always ask your healthcare professional. [place] [address] any warranty or liability for your use of this information. Learning About Medical Power of   What is a medical power of ? A medical power of , also called a durable power of  for health care, is one type of the legal forms called advance directives. It lets you name the person you want to make treatment decisions for you if you can't speak or decide for yourself. The person you choose is called your health care agent. This person is also called a health care proxy or health care surrogate. A medical power of  may be called something else in your state. How do you choose a health care agent? Choose your health care agent carefully. This person may or may not be a family member. Talk to the person before you make your final decision. Make sure he or she is comfortable with this responsibility. It's a good idea to choose someone who:  Is at least 25years old.   Knows you well and understands what makes life meaningful for you. Understands your Gnosticist and moral values. Will do what you want, not what he or she wants. Will be able to make difficult choices at a stressful time. Will be able to refuse or stop treatment, if that is what you would want, even if you could die. Will be firm and confident with health professionals if needed. Will ask questions to get needed information. Lives near you or agrees to travel to you if needed. Your family may help you make medical decisions while you can still be part of that process. But it's important to choose one person to be your health care agent in case you aren't able to make decisions for yourself. If you don't fill out the legal form and name a health care agent, the decisions your family can make may be limited. A health care agent may be called something else in your state. Who will make decisions for you if you don't have a health care agent? If you don't have a health care agent or a living will, you may not get the care you want. Decisions may be made by family members who disagree about your medical care. Or decisions may be made by a medical professional who doesn't know you well. In some cases, a  makes the decisions. When you name a health care agent, it is very clear who has the power to make health decisions for you. How do you name a health care agent? You name your health care agent on a legal form. This form is usually called a medical power of . Ask your hospital, state bar association, or office on aging where to find these forms. You must sign the form to make it legal. Some states require you to get the form notarized. This means that a person called a  watches you sign the form and then he or she signs the form. Some states also require that two or more witnesses sign the form. Be sure to tell your family members and doctors who your health care agent is. Where can you learn more?   Go to [email or web address]://[email or web address] org and sign in to your [employer] account. Enter [id number] in the [facility] box to learn more about \"Learning About [address]. \"     If you do not have an account, please click on the \"Sign Up Now\" link. Current as of: October 6, 2021               Content Version: 13.4  © 2006-2022 Healthwise, Q.ME. Care instructions adapted under license by [facility] ([facility]). If you have questions about a medical condition or this instruction, always ask your healthcare professional. [address] any warranty or liability for your use of this information. Learning About Living Burk Prader  What is a living will? A living will, also called a declaration, is a legal form. It tells your family and your doctor your wishes when you can't speak for yourself. It's used by the health professionals who will treat you as you near the end of your life or if you get seriously hurt or ill. If you put your wishes in writing, your loved ones and others will know what kind of care you want. They won't need to guess. This can ease your mind and be helpful to others. And you can change or cancel your living will at any time. A living will is not the same as an estate or property will. An estate will explains what you want to happen with your money and property after you die. How do you use it? Keep these facts in mind about how a living will is used. Your living will is used only if you can't speak or make decisions for yourself. Most often, one or more doctors must certify that you can't speak or decide for yourself before your living will takes effect. If you get better and can speak for yourself again, you can accept or refuse any treatment. It doesn't matter what you said in your living will. Some states may limit your right to refuse treatment in certain cases.  For example, you may need to clearly state in your living will that you don't want artificial hydration and nutrition, such as being fed through a tube. Is a living will a legal document? A living will is a legal document. Each state has its own laws about living harrell. And a living will may be called something else in your state. Here are some things to know about living harrell. You don't need an  to complete a living will. But legal advice can be helpful if your state's laws are unclear. It can also help if your health history is complicated or your family can't agree on what should be in your living will. You can change your living will at any time. Some people find that their wishes about end-of-life care change as their health changes. If you make big changes to your living will, complete a new form. If you move to another state, make sure that your living will is legal in the state where you now live. In most cases, doctors will respect your wishes even if you have a form from a different state. You might use a universal form that has been approved by many states. This kind of form can sometimes be filled out and stored online. Your digital copy will then be available wherever you have a connection to the internet. The doctors and nurses who need to treat you can find it right away. Your state may offer an online registry. This is another place where you can store your living will online. It's a good idea to get your living will notarized. This means using a person called a  to watch two people sign, or witness, your living will. What should you know when you create a living will? Here are some questions to ask yourself as you make your living will. Do you know enough about life support methods that might be used? If not, talk to your doctor so you know what might be done if you can't breathe on your own, your heart stops, or you can't swallow. What things would you still want to be able to do after you receive life-support methods?  Would you want to be able to walk? To speak? To eat on your own? To live without the help of machines? Do you want certain Taoism practices performed if you become very ill? If you have a choice, where do you want to be cared for? In your home? At a hospital or nursing home? If you have a choice at the end of your life, where would you prefer to die? At home? In a hospital or nursing home? Somewhere else? Would you prefer to be buried or cremated? Do you want your organs to be donated after you die? What should you do with your living will? Make sure that your family members and your health care agent have copies of your living will (also called a declaration). Give your doctor a copy of your living will. Ask to have it kept as part of your medical record. If you have more than one doctor, make sure that each one has a copy. Put a copy of your living will where it can be easily found. For example, some people may put a copy on their refrigerator door. If you are using a digital copy, be sure your doctor, family members, and health care agent know how to find and access it. Where can you learn more? Go to https://[email or web address] [email or web address].[email or web address] org and sign in to your [employer] account. Enter T624 in the [employer] box to learn more about \"Learning About Living [place]. \"     If you do not have an account, please click on the \"Sign Up Now\" link. Current as of: June 16, 2022               Content Version: 13.4  © 2006-2022 Healthwise, Incorporated. Care instructions adapted under license by [facility] ([facility]). If you have questions about a medical condition or this instruction, always ask your healthcare professional. [place] [address] any warranty or liability for your use of this information. Learning About Changing a Habit by Setting Goals  How can you change a habit?      If you've decided to change a habit--whether it's quitting smoking, lowering your blood pressure, becoming more active, or doing something else to improve your health--congratulations! Making that decision is the first step toward making a change. What happens next? Have a reason. Set goals you can reach. Prepare for slip-ups. And get support. What's your reason? Your reason for wanting to change a habit is really important. Maybe you want to quit smoking so that you can avoid future health problems. Or maybe you want to eat a healthier diet so you can lose weight. If you have high blood pressure, your reason may be clear: to lower your blood pressure. Maybe you smoke and want to save money on cigarettes. You need to feel ready to make a change. If you don't feel ready now, that's okay. You can still be thinking and planning. When you truly want to make changes, you're ready for the next step. It's not easy to change habits--but you can do it. Taking the time to really think about what will motivate or inspire you will help you reach your goals. How do you set goals? Setting goals can help a lot when you're trying to make a healthy change. Focus on small goals. This will help you reach larger goals over time. With smaller goals, you'll have success more often, which will help you stay with it. For example, your large goal may be to lose 20 pounds. Your small goal could be to lose 5. Write down your goals. This will help you remember, and you'll have a clearer idea of what you want to achieve. Use a journal or notebook to record your goals. Hang up your plan where you will see it often as a reminder of what you're trying to do. Make your goals specific. Specific goals help you measure your progress. For example, setting a goal to eat one extra serving of vegetables a day is better than a general goal to \"eat more vegetables. \"  Focus on one goal at a time. By doing this, you're less likely to feel overwhelmed and then give up. When you reach a goal, reward yourself.   Celebrate your new behavior and success for several days, and then think about setting your next goal.  How can you prepare for slip-ups? It's perfectly normal to try to change a habit, go along fine for a while, and then have a setback. Lots of people try and try again before they reach their goals. What are the things that might cause a setback for you? If you have tried to change a habit before, think about what helped you and what got in your way. By thinking about these barriers now, you can plan ahead for how to deal with them if they happen. There will be times when you slip up and don't make your goal for the week. When that happens, don't get mad at yourself. Learn from the experience. Ask yourself what got in the way of reaching your goal. Positive thinking goes a long way when you're making lifestyle changes. How can you get support? Get a partner. It's motivating to know that someone is trying to make the same change that you're making, like being more active or changing your eating habits. You have someone who is counting on you to help them succeed. That person can also remind you how far you've come. Get friends and family involved. They can exercise with you. Or they can encourage you by saying how they admire what you are doing. Family members can join you in your healthy eating efforts. Don't be afraid to tell family and friends that their encouragement makes a big difference to you. Join a class or support group. People in these groups often have some of the same barriers you have. They can give you support when you don't feel like staying with your plan. They can boost your morale when you need a lift. Kia Benoit also find a number of online support groups. Encourage yourself. When you feel like giving up, don't waste energy feeling bad about yourself. Remember your reason for wanting to change, think about the progress you've made, and give yourself a pep talk and a pat on the back. Get professional help.  A dietitian can help you make your diet healthier while still allowing you to eat foods that you enjoy. A  or physical therapist can help design an exercise program that is fun and easy to stay on. A counselor, a , or your doctor can help you overcome hurdles, reduce stress, or quit smoking. Where can you learn more? Go to https://[email or web address].[email or web address] and sign in to your [employer] account. Enter W683 in the Impact box to learn more about \"Learning About Changing a Habit by Setting Goals. \"     If you do not have an account, please click on the \"Sign Up Now\" link. Current as of: February 9, 2022               Content Version: 13.4  © 2006-2022 [employer]. Care instructions adapted under license by [facility] ([facility]). If you have questions about a medical condition or this instruction, always ask your healthcare professional. [place] [address] any warranty or liability for your use of this information. A Healthy Lifestyle: Care Instructions  A healthy lifestyle can help you feel good, have more energy, and stay at a weight that's healthy for you. You can share a healthy lifestyle with your friends and family. And you can do it on your own. Eat meals with your friends or family. You could try cooking together. Plan activities with other people. Go for a walk with a friend, try a free online fitness class, or join a sports league. Eat a variety of healthy foods. These include fruits, vegetables, whole grains, low-fat dairy, and lean protein. Choose healthy portions of food. You can use the Nutrition Facts label on food packages as a guide. Eat more fruits and vegetables. You could add vegetables to sandwiches or add fruit to cereal.  Drink water when you are thirsty. Limit soda, juice, and sports drinks. Try to exercise most days. Aim for at least 2½ hours of exercise each week. Keep moving. Work in the garden or take your dog on a walk.  Use the stairs instead of the elevator. If you use tobacco or nicotine, try to quit. Ask your doctor about programs and medicines to help you quit. Limit alcohol. Men should have no more than 2 drinks a day. Women should have no more than 1. For some people, no alcohol is the best choice. Follow-up care is a key part of your treatment and safety. Be sure to make and go to all appointments, and call your doctor if you are having problems. It's also a good idea to know your test results and keep a list of the medicines you take. Where can you learn more? Go to https://New Earth SolutionspepicewKateeva.Masher. org and sign in to your [employer] account. Enter X813 in the Myandb box to learn more about \"A Healthy Lifestyle: Care Instructions. \"     If you do not have an account, please click on the \"Sign Up Now\" link. Current as of: March 9, 2022               Content Version: 13.4  © 2006-2022 Whatever. Care instructions adapted under license by [facility] ([facility]). If you have questions about a medical condition or this instruction, always ask your healthcare professional. [place] [address] any warranty or liability for your use of this information. Heart-Healthy Diet   Sodium, Fat, and Cholesterol Controlled Diet       What Is a Heart Healthy Diet? A heart-healthy diet is one that limits sodium , certain types of fat , and cholesterol . This type of diet is recommended for:   People with any form of cardiovascular disease (eg, coronary heart disease , peripheral vascular disease , previous heart attack , previous stroke )   People with risk factors for cardiovascular disease, such as high blood pressure , high cholesterol , or diabetes   Anyone who wants to lower their risk of developing cardiovascular disease   Sodium    Sodium is a mineral found in many foods. In general, most people consume much more sodium than they need.  Diets high in sodium can increase blood pressure and lead to edema (water retention). On a heart-healthy diet, you should consume no more than 2,300 mg (milligrams) of sodium per dayabout the amount in one teaspoon of table salt. The foods highest in sodium include table salt (about 50% sodium), processed foods, convenience foods, and preserved foods. Cholesterol    Cholesterol is a fat-like, waxy substance in your blood. Our bodies make some cholesterol. It is also found in animal products, with the highest amounts in fatty meat, egg yolks, whole milk, cheese, shellfish, and organ meats. On a heart-healthy diet, you should limit your cholesterol intake to less than 200 mg per day. It is normal and important to have some cholesterol in your bloodstream. But too much cholesterol can cause plaque to build up within your arteries, which can eventually lead to a heart attack or stroke. The two types of cholesterol that are most commonly referred to are:   Low-density lipoprotein (LDL) cholesterol  Also known as bad cholesterol, this is the cholesterol that tends to build up along your arteries. Bad cholesterol levels are increased by eating fats that are saturated or hydrogenated. Optimal level of this cholesterol is less than 100. Over 130 starts to get risky for heart disease. High-density lipoprotein (HDL) cholesterol  Also known as good cholesterol, this type of cholesterol actually carries cholesterol away from your arteries and may, therefore, help lower your risk of having a heart attack. You want this level to be high (ideally greater than 60). It is a risk to have a level less than 40. You can raise this good cholesterol by eating olive oil, canola oil, avocados, or nuts. Exercise raises this level, too. Fat    Fat is calorie dense and packs a lot of calories into a small amount of food. Even though fats should be limited due to their high calorie content, not all fats are bad. In fact, some fats are quite healthful. Fat can be broken down into four main types.    The good-for-you fats are:   Monounsaturated fat  found in oils such as olive and canola, avocados, and nuts and natural nut butters; can decrease cholesterol levels, while keeping levels of HDL cholesterol high   Polyunsaturated fat  found in oils such as safflower, sunflower, soybean, corn, and sesame; can decrease total cholesterol and LDL cholesterol   Omega-3 fatty acids  particularly those found in fatty fish (such as salmon, trout, tuna, mackerel, herring, and sardines); can decrease risk of arrhythmias, decrease triglyceride levels, and slightly lower blood pressure   The fats that you want to limit are:   Saturated fat  found in animal products, many fast foods, and a few vegetables; increases total blood cholesterol, including LDL levels   Animal fats that are saturated include: butter, lard, whole-milk dairy products, meat fat, and poultry skin   Vegetable fats that are saturated include: hydrogenated shortening, palm oil, coconut oil, cocoa butter   Hydrogenated or trans fat  found in margarine and vegetable shortening, most shelf stable snack foods, and fried foods; increases LDL and decreases HDL     It is generally recommended that you limit your total fat for the day to less than 30% of your total calories. If you follow an 1800-calorie heart healthy diet, for example, this would mean 60 grams of fat or less per day. Saturated fat and trans fat in your diet raises your blood cholesterol the most, much more than dietary cholesterol does. For this reason, on a heart-healthy diet, less than 7% of your calories should come from saturated fat and ideally 0% from trans fat. On an 1800-calorie diet, this translates into less than 14 grams of saturated fat per day, leaving 46 grams of fat to come from mono- and polyunsaturated fats.    Food Choices on a Heart Healthy Diet   Food Category   Foods Recommended   Foods to Avoid   Grains   Breads and rolls without salted tops Most dry and cooked cereals Unsalted crackers and breadsticks Low-sodium or homemade breadcrumbs or stuffing All rice and pastas   Breads, rolls, and crackers with salted tops High-fat baked goods (eg, muffins, donuts, pastries) Quick breads, self-rising flour, and biscuit mixes Regular bread crumbs Instant hot cereals Commercially prepared rice, pasta, or stuffing mixes   Vegetables   Most fresh, frozen, and low-sodium canned vegetables Low-sodium and salt-free vegetable juices Canned vegetables if unsalted or rinsed   Regular canned vegetables and juices, including sauerkraut and pickled vegetables Frozen vegetables with sauces Commercially prepared potato and vegetable mixes   Fruits   Most fresh, frozen, and canned fruits All fruit juices   Fruits processed with salt or sodium   Milk   Nonfat or low-fat (1%) milk Nonfat or low-fat yogurt Cottage cheese, low-fat ricotta, cheeses labeled as low-fat and low-sodium   Whole milk Reduced-fat (2%) milk Malted and chocolate milk Full fat yogurt Most cheeses (unless low-fat and low salt) Buttermilk (no more than 1 cup per week)   Meats and Beans   Lean cuts of fresh or frozen beef, veal, lamb, or pork (look for the word loin) Fresh or frozen poultry without the skin Fresh or frozen fish and some shellfish Egg whites and egg substitutes (Limit whole eggs to three per week) Tofu Nuts or seeds (unsalted, dry-roasted), low-sodium peanut butter Dried peas, beans, and lentils   Any smoked, cured, salted, or canned meat, fish, or poultry (including nolasco, chipped beef, cold cuts, hot dogs, sausages, sardines, and anchovies) Poultry skins Breaded and/or fried fish or meats Canned peas, beans, and lentils Salted nuts   Fats and Oils   Olive oil and canola oil Low-sodium, low-fat salad dressings and mayonnaise   Butter, margarine, coconut and palm oils, nolasco fat   Snacks, Sweets, and Condiments   Low-sodium or unsalted versions of broths, soups, soy sauce, and condiments Pepper, herbs, and spices; vinegar, lemon, or lime juice Low-fat frozen desserts (yogurt, sherbet, fruit bars) Sugar, cocoa powder, honey, syrup, jam, and preserves Low-fat, trans-fat free cookies, cakes, and pies Drew and animal crackers, fig bars, sohan snaps   High-fat desserts Broth, soups, gravies, and sauces, made from instant mixes or other high-sodium ingredients Salted snack foods Canned olives Meat tenderizers, seasoning salt, and most flavored vinegars   Beverages   Low-sodium carbonated beverages Tea and coffee in moderation Soy milk   Commercially softened water   Suggestions   Make whole grains, fruits, and vegetables the base of your diet. Choose heart-healthy fats such as canola, olive, and flaxseed oil, and foods high in heart-healthy fats, such as nuts, seeds, soybeans, tofu, and fish. Eat fish at least twice per week; the fish highest in omega-3 fatty acids and lowest in mercury include salmon, herring, mackerel, sardines, and canned chunk light tuna. If you eat fish less than twice per week or have high triglycerides, talk to your doctor about taking fish oil supplements. Read food labels. For products low in fat and cholesterol, look for fat free, low-fat, cholesterol free, saturated fat free, and trans fat freeAlso scan the Nutrition Facts Label, which lists saturated fat, trans fat, and cholesterol amounts. For products low in sodium, look for sodium free, very low sodium, low sodium, no added salt, and unsalted   Skip the salt when cooking or at the table; if food needs more flavor, get creative and try out different herbs and spices. Garlic and onion also add substantial flavor to foods. Trim any visible fat off meat and poultry before cooking, and drain the fat off after coleman. Use cooking methods that require little or no added fat, such as grilling, boiling, baking, poaching, broiling, roasting, steaming, stir-frying, and sauting. Avoid fast food and convenience food.  They tend to be high in saturated and trans fat and have a lot of added salt. Talk to a registered dietitian for individualized diet advice. Last Reviewed: March 2011 Artemio Amado MS, MPH, RD   Updated: 3/29/2011       Preventing Osteoporosis: After Your Visit  Your Care Instructions  Osteoporosis means the bones are weak and thin enough that they can break easily. The older you are, the more likely you are to get osteoporosis. But with plenty of calcium, vitamin D, and exercise, you can help prevent osteoporosis. The preteen and teen years are a key time for bone building. With the help of calcium, vitamin D, and exercise in those early years and beyond, the bones reach their peak density and strength by age 27. After age 27, your bones naturally start to thin and weaken. The stronger your bones are at around age 27, the lower your risk for osteoporosis. But no matter what your age and risk are, your bones still need calcium, vitamin D, and exercise to stay strong. Also avoid smoking, and limit alcohol. Smoking and heavy alcohol use can make your bones thinner. Talk to your doctor about any special risks you might have, such as having a close relative with osteoporosis or taking a medicine that can weaken bones. Your doctor can tell you the best ways to protect your bones from thinning. Follow-up care is a key part of your treatment and safety. Be sure to make and go to all appointments, and call your doctor if you are having problems. It's also a good idea to know your test results and keep a list of the medicines you take. How can you care for yourself at home? Get enough calcium and vitamin D. The [place] [employer] recommends adults younger than age 46 need 1,000 mg of calcium and 600 IU of vitamin D each day. Women ages 46 to 79 need 1,200 mg of calcium and 600 IU of vitamin D each day. Men ages 46 to 79 need 1,000 mg of calcium and 600 IU of vitamin D each day.  Adults 71 and older need 1,200 mg of calcium and 800 IU of vitamin D each day. Eat foods rich in calcium, like yogurt, cheese, milk, and dark green vegetables. Eat foods rich in vitamin D, like eggs, fatty fish, cereal, and fortified milk. Get some sunshine. Your body uses sunshine to make its own vitamin D. The safest time to be out in the sun is before 10 a.m. or after 3 p.m. Avoid getting sunburned. Sunburn can increase your risk of skin cancer. Talk to your doctor about taking a calcium plus vitamin D supplement. Ask about what type of calcium is right for you, and how much to take at a time. Adults ages 23 to 48 should not get more than 2,500 mg of calcium and 4,000 IU of vitamin D each day, whether it is from supplements and/or food. Adults ages 46 and older should not get more than 2,000 mg of calcium and 4,000 IU of vitamin D each day from supplements and/or food. Get regular bone-building exercise. Weight-bearing and resistance exercises keep bones healthy by working the muscles and bones against gravity. Start out at an exercise level that feels right for you. Add a little at a time until you can do the following:  Do 30 minutes of weight-bearing exercise on most days of the week. Walking, jogging, stair climbing, and dancing are good choices. Do resistance exercises with weights or elastic bands 2 to 3 days a week. Limit alcohol. Drink no more than 1 alcohol drink a day if you are a woman. Drink no more than 2 alcohol drinks a day if you are a man. Do not smoke. Smoking can make bones thin faster. If you need help quitting, talk to your doctor about stop-smoking programs and medicines. These can increase your chances of quitting for good. When should you call for help? Watch closely for changes in your health, and be sure to contact your doctor if:  You need help with a healthy eating plan. You need help with an exercise plan    © [id number] Debi Carreno. Care instructions adapted under license by [facility].  This care instruction is for use with your licensed healthcare professional. If you have questions about a medical condition or this instruction, always ask your healthcare professional. [address] any warranty or liability for your use of this information. Content Version: 9.4.35178; Last Revised: June 20, 2011                High-Fiber Diet     What Is Fiber? Dietary fiber is a form of carbohydrate found in plants that cannot be digested by humans. All plants contain fiber, including fruits, vegetables, grains, and legumes. Fiber is often classified into two categories: soluble and insoluble. Soluble fiber draws water into the bowel and can help slow digestion. Examples of foods that are high in soluble fiber include oatmeal, oat bran, barley, legumes (eg, beans and peas), apples, and strawberries. Insoluble fiber speeds digestion and can add bulk to the stool. Examples of foods that are high in insoluble fiber include whole-wheat products, wheat bran, cauliflower, green beans, and potatoes. Why Follow a High-Fiber Diet? A high-fiber diet is often recommended to prevent and treat constipation , hemorrhoids , diverticulitis , and irritable bowel syndrome . Eating a high-fiber diet can also help improve your cholesterol levels, lower your risk of coronary heart disease , reduce your risk of type 2 diabetes , and lower your weight. For people with type 1 or 2 diabetes, a high-fiber diet can also help stabilize blood sugar levels. How Much Fiber Should I Eat? A high-fiber diet should contain  20-35 grams  of fiber a day. This is actually the amount recommended for the general adult population; however, most Americans eat only 15 grams of fiber per day. Digestion of Fiber   Eating a higher fiber diet than usual can take some getting used to by your body's digestive system.  To avoid the side effects of sudden increases in dietary fiber (eg, gas, cramping, bloating, and diarrhea), increase fiber gradually and be sure to drink plenty of fluids every day. Tips for Increasing Fiber Intake   Whenever possible, choose whole grains over refined grains (eg, brown rice instead of white rice, whole-wheat bread instead of white bread). Include a variety of grains in your diet, such as wheat, rye, barley, oats, quinoa, and bulgur. Eat more vegetarian-based meals. Here are some ideas: black bean burgers, eggplant lasagna, and veggie tofu stir-garvin. Choose high-fiber snacks, such as fruits, popcorn, whole-grain crackers, and nuts. Make whole-grain cereal or whole-grain toast part of your daily breakfast regime. When eating out, whether ordering a sandwich or dinner, ask for extra vegetables. When baking, replace part of the white flour with whole-wheat flour. Whole-wheat flour is particularly easy to incorporate into a recipe. High-Fiber Diet Eating Guide   Food Category   Foods Recommended   Notes   Grains   Whole-grain breads, muffins, bagels, or eleanor bread Rye bread Whole-wheat crackers or crisp breads Whole-grain or bran cereals Oatmeal, oat bran, or grits Wheat germ Whole-wheat pasta and brown rice   Read the ingredients list on food labels. Look for products that list \"whole\" as the first ingredient (eg, whole-wheat, whole oats). Choose cereals with at least 2 grams of fiber per serving. Vegetables   All vegetables, especially asparagus, bean sprouts, broccoli, [place] sprouts, cabbage, carrots, cauliflower, celery, corn, greens, green beans, green pepper, onions, peas, potatoes (with skin), snow peas, spinach, squash, sweet potatoes, tomatoes, zucchini   For maximum fiber intake, eat the peels of fruits and vegetablesjust be sure to wash them well first.   Fruits   All fruits, especially apples, berries, grapefruits, mangoes, nectarines, oranges, peaches, pears, dried fruits (figs, dates, prunes, raisins)   Choose raw fruits and vegetables over juice, cooked, or cannedraw fruit has more fiber.  Dried and culture of your destination   Learning to use a computer   Going to museums, the theater, or thought-provoking movies   Changing things in your daily life, such as reversing your pattern in the grocery store or brushing your teeth using your nondominant hand   Use Memory Aids   There is no need to remember every detail on your own. These memory aids can help:   Calendars and day planners   Electronic organizers to store all sorts of helpful informationThese devices can \"beep\" to remind you of appointments. A book of days to record birthdays, anniversaries, and other occasions that occur on the same date every year   Detailed \"to-do\" lists and strategically placed sticky notes   Quick \"study\" sessionsBefore a gathering, review who will be there so their names will be fresh in your mind. Establish routinesFor example, keep your keys, wallet, and umbrella in the same place all the time or take medicine with your 8:00 AM glass of juice   Live a Healthy Life   Many actions that will keep your body strong will do the same for your mind. For example:   Talk to Your Doctor About Herbs and Supplements    Malnutrition and vitamin deficiencies can impair your mental function. For example, vitamin B12 deficiency can cause a range of symptoms, including confusion. But, what if your nutritional needs are being met? Can herbs and supplements still offer a benefit? Researchers have investigated a range of natural remedies, such as ginkgo , ginseng , and the supplement phosphatidylserine (PS). So far, though, the evidence is inconsistent as to whether these products can improve memory or thinking. If you are interested in taking herbs and supplements, talk to your doctor first because they may interact with other medicines that you are taking.    Exercise Regularly    Among the many benefits of regular exercise are increased blood flow to the brain and decreased risk of certain diseases that can interfere with memory function. One study found that even moderate exercise has a beneficial effect. Examples of \"moderate\" exercise include:   Playing 18 holes of golf once a week, without a cart   Playing tennis twice a week   Walking one mile per day   Manage Stress    It can be tough to remember what is important when your mind is cluttered. Make time for relaxation. Choose activities that calm you down, and make it routine. Manage Chronic Conditions    Side effects of high blood pressure , diabetes, and heart disease can interfere with mental function. Many of the lifestyle steps discussed here can help manage these conditions. Strive to eat a healthy diet, exercise regularly, get stress under control, and follow your doctor's advice for your condition. Minimize Medications    Talk to your doctor about the medicines that you take. Some may be unnecessary. Also, healthy lifestyle habits may lower the need for certain drugs. Last Reviewed: April 2010 Jac Mendes MD   Updated: 4/13/2010     Keeping Home a [facility]       As we get older, changes in balance, gait, strength, vision, hearing, and cognition make even the most youthful senior more prone to accidents. Falls are one of the leading health risks for older people. This increased risk of falling is related to:   Aging process (eg, decreased muscle strength, slowed reflexes)   Higher incidence of chronic health problems (eg, arthritis, diabetes) that may limit mobility, agility or sensory awareness   Side effects of medicine (eg, dizziness, blurred vision)especially medicines like prescription pain medicines and drugs used to treat mental health conditions   Depending on the brittleness of your bones, the consequences of a fall can be serious and long lasting.    Home Life   Research by the Association of Aging [facility]) shows that some home accidents among older adults can be prevented by making simple lifestyle changes and basic modifications and repairs to the home environment. Here are some lifestyle changes that experts recommend:   Have your hearing and vision checked regularly. Be sure to wear prescription glasses that are right for you. Speak to your doctor or pharmacist about the possible side effects of your medicines. A number of medicines can cause dizziness. If you have problems with sleep, talk to your doctor. Limit your intake of alcohol. If necessary, use a cane or walker to help maintain your balance. Wear supportive, rubber-soled shoes, even at home. If you live in a region that gets wintry weather, you may want to put special cleats on your shoes to prevent you from slipping on the snow and ice. Exercise regularly to help maintain muscle tone, agility, and balance. Always hold the banister when going up or down stairs. Also, use  bars when getting in or out of the bath or shower, or using the toilet. To avoid dizziness, get up slowly from a lying down position. Sit up first, dangling your legs for a minute or two before rising to a standing position. Overall Home Safety Check   According to the Consumer Product Safety Commision's \"Older Consumer Home Safety Checklist,\" it is important to check for potential hazards in each room. And remember, proper lighting is an essential factor in home safety. If you cannot see clearly, you are more likely to fall. Important questions to ask yourself include:   Are lamp, electric, extension, and telephone cords placed out of the flow of traffic and maintained in good condition? Have frayed cords been replaced? Are all small rugs and runners slip resistant? If not, you can secure them to the floor with a special double-sided carpet tape. Are smoke detectors properly locatedone on every floor of your home and one outside of every sleeping area? Are they in good working order? Are batteries replaced at least once a year?    Do you have a well-maintained carbon monoxide detector outside every sleeping are in your home? Does your furniture layout leave plenty of space to maneuver between and around chairs, tables, beds, and sofas? Are hallways, stairs and passages between rooms well lit? Can you reach a lamp without getting out of bed? Are floor surfaces well maintained? Shag rugs, high-pile carpeting, tile floors, and polished wood floors can be particularly slippery. Stairs should always have handrails and be carpeted or fitted with a non-skid tread. Is your telephone easily reachable. Is the cord safely tucked away? Room by Room   According to the Association of Aging, bathrooms and cecilia are the two most potentially hazardous rooms in your home. In the Kitchen    Be sure your stove is in proper working order and always make sure burners and the oven are off before you go out or go to sleep. Keep pots on the back burners, turn handles away from the front of the stove, and keep stove clean and free of grease build-up. Kitchen ventilation systems and range exhausts should be working properly. Keep flammable objects such as towels and pot holders away from the cooking area except when in use. Make sure kitchen curtains are tied back. Move cords and appliances away from the sink and hot surfaces. If extension cords are needed, install wiring guides so they do not hang over the sink, range, or working areas. Look for coffee pots, kettles and toaster ovens with automatic shut-offs. Keep a mop handy in the kitchen so you can wipe up spills instantly. You should also have a small fire extinguisher. Arrange your kitchen with frequently used items on lower shelves to avoid the need to stand on a stepstool to reach them. Make sure countertops are well-lit to avoid injuries while cutting and preparing food. In the Bathroom    Use a non-slip mat or decals in the tub and shower, since wet, soapy tile or porcelain surfaces are extremely slippery.     Make sure bathroom rugs are non-skid or tape them firmly to the floor. Bathtubs should have at least one, preferably two, grab bars, firmly attached to structural supports in the wall. (Do not use built-in soap holders or glass shower doors as grab bars.)    Tub seats fitted with non-slip material on the legs allow you to wash sitting down. For people with limited mobility, bathtub transfer benches allow you to slide safely into the tub. Raised toilet seats and toilet safety rails are helpful for those with knee or hip problems. In the [facility]    Make sure you use a nightlight and that the area around your bed is clear of potential obstacles. Be careful with electric blankets and never go to sleep with a heating pad, which can cause serious burns even if on a low setting. Use fire-resistant mattress covers and pillows, and NEVER smoke in bed. Keep a phone next to the bed that is programmed to dial 911 at the push of a button. If you have a chronic condition, you may want to sign on with an automatic call-in service. Typically the system includes a small pendant that connects directly to an emergency medical voice-response system. You should also make arrangements to stay in contact with someonefriend, neighbor, family memberon a regular schedule. Fire Prevention   According to the [employer]. (Smoke Alarms for Every) [address] [place] [address], senior citizens are one of the two highest risk groups for death and serious injuries due to residential fires. When cooking, wear short-sleeved items, never a bulky long-sleeved robe. The [facility]'s Safety Checklist for Older Consumers emphasizes the importance of checking basements, garages, workshops and storage areas for fire hazards, such as volatile liquids, piles of old rags or clothing and overloaded circuits. Never smoke in bed or when lying down on a couch or recliner chair.     Small portable electric or kerosene heaters are responsible for many home fires and should be used cautiously if at all. If you do use one, be sure to keep them away from flammable materials. In case of fire, make sure you have a pre-established emergency exit plan. Have a professional check your fireplace and other fuel-burning appliances yearly. [employer]   Baby boomers entering the weinstein years will continue to see the development of new products to help older adults live safely and independently in spite of age-related changes. Making Life More Livable  , by [employer], lists over 1,000 products for \"living well in the mature years,\" such as bathing and mobility aids, household security devices, ergonomically designed knives and peelers, and faucet valves and knobs for temperature control. Medical supply stores and organizations are good sources of information about products that improve your quality of life and insure your safety.      Last Reviewed: November 2009 Marvin Santana MD   Updated: 3/7/2011

## 2022-09-26 NOTE — PROGRESS NOTES
Vaccine Information Sheet, \"Influenza - Inactivated\"  given to Nathalie Dillard, or parent/legal guardian of  Nathalie Dillard and verbalized understanding. Patient responses:    Have you ever had a reaction to a flu vaccine? No  Are you able to eat eggs without adverse effects? Yes  Do you have any current illness? No  Have you ever had Guillian [place] Syndrome? No    Flu vaccine given per order. Please see immunization tab. After obtaining consent, and per orders of Dr. Bettina Cho, injection of Prevnar 20 given in Left deltoid by Pj Lagos MA. Patient instructed to remain in clinic for 20 minutes afterwards, and to report any adverse reaction to me immediately.

## 2022-09-26 NOTE — PROGRESS NOTES
Well Adult Note  Name: Larissa Grant Date: 2022   MRN: [id number] Sex: Female   Age: 75 y.o. Ethnicity: Non- / Non    : 1955 Race: White (non-)      Sal Russell is here for well adult exam.  History:  No chest pain, pressure or tightness, dizziness, headache, vision changes, palpitations, swelling in feet/legs. No fevers, chills, sweats. No unintended weight loss. No abdominal pain, nausea, vomiting, diarrhea, constipation, bloody stools, black tarry stools. No rashes. No swollen glands. Allergies   Allergen Reactions    Codeine Nausea And Vomiting    Diazepam      Other reaction(s): Mental Status Change    Tape [Adhesive Tape] Rash    Valium Anxiety         Prior to Visit Medications    Medication Sig Taking?  Authorizing Provider   Lactobacillus (PROBIOTIC ACIDOPHILUS PO) Take by mouth Yes Historical Provider, MD   zoster recombinant adjuvanted vaccine (SHINGRIX) 50 MCG/0.5ML SUSR injection Inject 0.5 mLs into the muscle once for 1 dose Yes Michelle Conde MD   Cholecalciferol (VITAMIN D3) 50 MCG ( UT) CAPS 1 po daily with meal Yes Michelle Conde MD   omeprazole (PRILOSEC) 40 MG delayed release capsule Take 1 capsule by mouth every morning (before breakfast) Yes Regino Matt, APRN - CNP         Past Medical History:   Diagnosis Date    Smith esophagus 2018    Dr Merlinda Mola, surveillance q 3 years    Benign gastric polyp     DJD (degenerative joint disease) of knee     Fibromyalgia     GERD (gastroesophageal reflux disease)     H/O bone density study 2012    osteopenia, was on Reclast    History of colonoscopy with polypectomy 2018    Dr Jeferson Rai, tubulovillous adenoma, tubular adenoma, repeat     History of vitamin D deficiency     Hyperlipidemia     Inconclusive mammogram 2018    repeat R breast in 6 months    Plantar fasciitis, bilateral     Reactive airway disease     PFT 2016       Past Surgical History:   Procedure Laterality Date    ABLATION OF DYSRHYTHMIC FOCUS  1993    attempted ablation     BREAST BIOPSY  1990    benign    CATARACT REMOVAL WITH IMPLANT Bilateral     Dr at the [facility]    COLONOSCOPY  9/14/15    w/polypectomy     ENDOSCOPY, COLON, DIAGNOSTIC      HYSTERECTOMY, TOTAL ABDOMINAL (CERVIX REMOVED)      fibroids, endometriosis, age 26    LAPAROSCOPY      PARATHYROID GLAND SURGERY      R side    ME COLORECTAL SCRN; HI RISK IND N/A 10/1/2018    COLONOSCOPY performed by Judah Nguyen MD at [address] [place] [address] ESOPHAGOGASTRODUODENOSCOPY TRANSORAL DIAGNOSTIC N/A 10/1/2018    EGD ESOPHAGOGASTRODUODENOSCOPY WITH DILATION performed by Judah Nguyen MD at [facility]  9/14/15    w/bx,polypectomy     UPPER GASTROINTESTINAL ENDOSCOPY N/A 12/23/2021    EGD ESOPHAGOGASTRODUODENOSCOPY performed by Winston Stafford MD at [facility]         Family History   Problem Relation Age of Onset    Hypertension Mother     Sudden Death Mother         dec age 80    Alzheimer's Disease Father         dec age 80    Colon Cancer Neg Hx        Social History     Tobacco Use    Smoking status: Never    Smokeless tobacco: Never   Vaping Use    Vaping Use: Never used   Substance Use Topics    Alcohol use: No    Drug use: No       Objective   /78   Pulse 100   Temp (!) 96.7 °F (35.9 °C) (Temporal)   Ht 5' 7\" (1.702 m)   Wt 162 lb (73.5 kg)   SpO2 96%   BMI 25.37 kg/m²   Wt Readings from Last 3 Encounters:   09/26/22 162 lb (73.5 kg)   12/23/21 158 lb (71.7 kg)   11/16/21 159 lb (72.1 kg)     There were no vitals filed for this visit. Physical Exam  Constitutional:       Appearance: She is well-developed. HENT:      Head: Normocephalic and atraumatic.       Right Ear: Tympanic membrane, ear canal and external ear normal.      Left Ear: Tympanic membrane, ear canal and external ear normal. Eyes:      General: No scleral icterus. Conjunctiva/sclera: Conjunctivae normal.   Cardiovascular:      Rate and Rhythm: Normal rate and regular rhythm. Heart sounds: Normal heart sounds. Pulmonary:      Effort: Pulmonary effort is normal.      Breath sounds: Normal breath sounds. Abdominal:      General: Bowel sounds are normal. There is no distension. Palpations: Abdomen is soft. There is no mass. Tenderness: There is no abdominal tenderness. Musculoskeletal:      Cervical back: Neck supple. No tenderness. Right lower leg: No edema. Left lower leg: No edema. Comments: Brace on left wrist   Lymphadenopathy:      Cervical: No cervical adenopathy. Skin:     General: Skin is warm and dry. Findings: Lesion (diffuse seb kerrs on back) present. No rash. Neurological:      Mental Status: She is alert and oriented to person, place, and time. Gait: Gait normal.   Psychiatric:         Mood and Affect: Mood normal.         Behavior: Behavior normal.         Thought Content: Thought content normal.         Judgment: Judgment normal.         Assessment   Plan   1. Encounter for well adult exam without abnormal findings  2. Encounter for screening mammogram for breast cancer  -     [facility] KINJAL DIGITAL SCREEN BILATERAL; Future  3. At high risk for falls  -     Cholecalciferol (VITAMIN D3) 50 MCG (2000 UT) CAPS; 1 po daily with meal, Disp-90 capsule, R-3Normal  4. Need for prophylactic vaccination against Streptococcus pneumoniae (pneumococcus)  -     Pneumococcal, PCV20, PREVNAR 21, (age 25 yrs+), IM, PF  5. Need for prophylactic vaccination and inoculation against varicella  -     zoster recombinant adjuvanted vaccine [facility]) 50 MCG/0.5ML SUSR injection; Inject 0.5 mLs into the muscle once for 1 dose, Disp-1 each, R-0Print  6. Need for influenza vaccination  7.  Need for Tdap vaccination  -     Tdap, BOOSTRIX, (age 8 yrs+), IM         Personalized Preventive Plan   Current Health Maintenance Status  Immunization History   Administered Date(s) Administered    COVID-19, PFIZER GRAY top, DO NOT Dilute, (age 15 y+), IM, 30 mcg/0.3 mL 04/26/2022    COVID-19, PFIZER PURPLE top, DILUTE for use, (age 15 y+), 30mcg/0.3mL 06/17/2021, 07/13/2021    Fluvirin 4 years and over 10/21/2015    Influenza Vaccine, unspecified formulation 10/21/2015    Influenza Virus Vaccine 10/24/2014, 11/08/2017, 10/25/2018, 11/08/2019, 11/18/2021    Influenza Whole 10/24/2014    Pneumococcal conjugate PCV20, PF (Prevnar 20) 09/26/2022    Tdap (Boostrix, Adacel) 09/26/2022        Health Maintenance   Topic Date Due    Shingles vaccine (1 of 2) Never done    Breast cancer screen  12/22/2020    Flu vaccine (1) 08/01/2022    COVID-19 Vaccine (4 - Booster for Pfizer series) 08/26/2022    Depression Screen  09/23/2022    Colorectal Cancer Screen  10/01/2023    Lipids  05/06/2027    DTaP/Tdap/Td vaccine (2 - Td or Tdap) 09/26/2032    DEXA (modify frequency per FRAX score)  Completed    Pneumococcal 65+ years Vaccine  Completed    Hepatitis A vaccine  Aged Out    Hepatitis B vaccine  Aged Out    Hib vaccine  Aged Out    Meningococcal (ACWY) vaccine  Aged Out    Hepatitis C screen  Discontinued     Recommendations for [employer] Due: see orders and patient instructions/AVS.    Return in about 1 year (around 9/26/2023) for Wellness Visit - Dr. Nataly Montana.       On the basis of positive falls risk screening, assessment and plan is as follows: home safety tips provided, prescribed vitamin D .

## 2022-10-20 ENCOUNTER — HOSPITAL ENCOUNTER (OUTPATIENT)
Dept: WOMENS IMAGING | Age: 67
Discharge: HOME | End: 2022-10-22
Payer: COMMERCIAL

## 2022-10-20 DIAGNOSIS — Z12.31 ENCOUNTER FOR SCREENING MAMMOGRAM FOR BREAST CANCER: ICD-10-CM

## 2022-10-20 PROCEDURE — 77063 BREAST TOMOSYNTHESIS BI: CPT

## 2023-09-27 ENCOUNTER — OFFICE VISIT (OUTPATIENT)
Dept: FAMILY MEDICINE CLINIC | Age: 68
End: 2023-09-27
Payer: COMMERCIAL

## 2023-09-27 VITALS
SYSTOLIC BLOOD PRESSURE: 132 MMHG | WEIGHT: 163 LBS | HEART RATE: 84 BPM | DIASTOLIC BLOOD PRESSURE: 72 MMHG | BODY MASS INDEX: 25.58 KG/M2 | HEIGHT: 67 IN | TEMPERATURE: 97.8 F | OXYGEN SATURATION: 98 %

## 2023-09-27 DIAGNOSIS — Z12.11 COLON CANCER SCREENING: ICD-10-CM

## 2023-09-27 DIAGNOSIS — Z71.85 VACCINE COUNSELING: ICD-10-CM

## 2023-09-27 DIAGNOSIS — Z76.89 ENCOUNTER TO ESTABLISH CARE WITH NEW DOCTOR: Primary | ICD-10-CM

## 2023-09-27 DIAGNOSIS — Z12.31 ENCOUNTER FOR SCREENING MAMMOGRAM FOR MALIGNANT NEOPLASM OF BREAST: ICD-10-CM

## 2023-09-27 DIAGNOSIS — K22.719 BARRETT'S ESOPHAGUS WITH DYSPLASIA: ICD-10-CM

## 2023-09-27 DIAGNOSIS — Z23 INFLUENZA VACCINE NEEDED: ICD-10-CM

## 2023-09-27 DIAGNOSIS — E55.9 HYPOVITAMINOSIS D: ICD-10-CM

## 2023-09-27 PROCEDURE — G0008 ADMIN INFLUENZA VIRUS VAC: HCPCS | Performed by: STUDENT IN AN ORGANIZED HEALTH CARE EDUCATION/TRAINING PROGRAM

## 2023-09-27 PROCEDURE — 99214 OFFICE O/P EST MOD 30 MIN: CPT | Performed by: STUDENT IN AN ORGANIZED HEALTH CARE EDUCATION/TRAINING PROGRAM

## 2023-09-27 PROCEDURE — 1123F ACP DISCUSS/DSCN MKR DOCD: CPT | Performed by: STUDENT IN AN ORGANIZED HEALTH CARE EDUCATION/TRAINING PROGRAM

## 2023-09-27 PROCEDURE — 90694 VACC AIIV4 NO PRSRV 0.5ML IM: CPT | Performed by: STUDENT IN AN ORGANIZED HEALTH CARE EDUCATION/TRAINING PROGRAM

## 2023-09-27 SDOH — ECONOMIC STABILITY: INCOME INSECURITY: HOW HARD IS IT FOR YOU TO PAY FOR THE VERY BASICS LIKE FOOD, HOUSING, MEDICAL CARE, AND HEATING?: NOT HARD AT ALL

## 2023-09-27 SDOH — ECONOMIC STABILITY: HOUSING INSECURITY
IN THE LAST 12 MONTHS, WAS THERE A TIME WHEN YOU DID NOT HAVE A STEADY PLACE TO SLEEP OR SLEPT IN A SHELTER (INCLUDING NOW)?: NO

## 2023-09-27 SDOH — ECONOMIC STABILITY: FOOD INSECURITY: WITHIN THE PAST 12 MONTHS, YOU WORRIED THAT YOUR FOOD WOULD RUN OUT BEFORE YOU GOT MONEY TO BUY MORE.: NEVER TRUE

## 2023-09-27 SDOH — ECONOMIC STABILITY: FOOD INSECURITY: WITHIN THE PAST 12 MONTHS, THE FOOD YOU BOUGHT JUST DIDN'T LAST AND YOU DIDN'T HAVE MONEY TO GET MORE.: NEVER TRUE

## 2023-09-27 ASSESSMENT — PATIENT HEALTH QUESTIONNAIRE - PHQ9
SUM OF ALL RESPONSES TO PHQ QUESTIONS 1-9: 0
SUM OF ALL RESPONSES TO PHQ QUESTIONS 1-9: 0
1. LITTLE INTEREST OR PLEASURE IN DOING THINGS: 0
SUM OF ALL RESPONSES TO PHQ QUESTIONS 1-9: 0
SUM OF ALL RESPONSES TO PHQ QUESTIONS 1-9: 0
SUM OF ALL RESPONSES TO PHQ9 QUESTIONS 1 & 2: 0
2. FEELING DOWN, DEPRESSED OR HOPELESS: 0

## 2023-09-27 ASSESSMENT — ENCOUNTER SYMPTOMS
WHEEZING: 0
VOMITING: 0
NAUSEA: 0
RHINORRHEA: 0
EYE DISCHARGE: 0
SORE THROAT: 0
SHORTNESS OF BREATH: 0
ABDOMINAL PAIN: 0
DIARRHEA: 0
COUGH: 0

## 2023-09-27 NOTE — PROGRESS NOTES
Subjective  Emmanuel Mccord, 70 y.o. female presents today with:  Chief Complaint   Patient presents with    New Patient       Patient with appointment to establish care. .  She retired just under a year ago. 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 Patient with history of Smith's esophagus. She is on omeprazole. She has been off of it for a while but she was not on insurance. She is planning to restart taking it. She does have occasional acid reflux. Patient also with hypovitaminosis D. She is stable. She does take vitamin D as Tatian. No fatigue. Patient does have questions regarding vaccines. She would like a flu shot today. She also would like to get RSV vaccine. She is also planning to get COVID booster. Patient also will be due for colon cancer screening. Referral will be placed. She is due for breast cancer screening. Mammogram will be ordered. She has no other concerns at this time. Review of Systems   Constitutional:  Negative for chills, fatigue, fever and unexpected weight change. HENT:  Negative for congestion, rhinorrhea and sore throat. Eyes:  Negative for discharge. Respiratory:  Negative for cough, shortness of breath and wheezing. Cardiovascular:  Negative for chest pain, palpitations and leg swelling. Gastrointestinal:  Negative for abdominal pain, diarrhea, nausea and vomiting. Genitourinary:  Negative for difficulty urinating. Musculoskeletal:  Negative for arthralgias and joint swelling. Skin:  Negative for rash. Neurological:  Negative for weakness, light-headedness, numbness and headaches. Psychiatric/Behavioral:  Negative for confusion and suicidal ideas. The patient is not nervous/anxious.         Past Medical History:   Diagnosis Date    Smith esophagus 2016, 2018    Dr Dipika Myers, surveillance q 3 years    Benign gastric polyp     DJD (degenerative joint disease) of knee     Fibromyalgia     GERD

## 2023-09-27 NOTE — PROGRESS NOTES
Vaccine Information Sheet, \"Influenza - Inactivated\"  given to Sunil Garcia, or parent/legal guardian of  Sunil Garcia and verbalized understanding. Patient responses:    Have you ever had a reaction to a flu vaccine? No  Are you able to eat eggs without adverse effects? Yes  Do you have any current illness? No  Have you ever had [place] Syndrome? No    Flu vaccine given per order. Please see immunization tab.

## 2023-11-15 ENCOUNTER — OFFICE VISIT (OUTPATIENT)
Dept: FAMILY MEDICINE CLINIC | Age: 68
End: 2023-11-15
Payer: COMMERCIAL

## 2023-11-15 VITALS
DIASTOLIC BLOOD PRESSURE: 72 MMHG | WEIGHT: 161.6 LBS | SYSTOLIC BLOOD PRESSURE: 115 MMHG | HEIGHT: 67 IN | RESPIRATION RATE: 16 BRPM | TEMPERATURE: 97 F | HEART RATE: 92 BPM | BODY MASS INDEX: 25.36 KG/M2 | OXYGEN SATURATION: 97 %

## 2023-11-15 DIAGNOSIS — M17.9 OSTEOARTHRITIS OF KNEE, UNSPECIFIED LATERALITY, UNSPECIFIED OSTEOARTHRITIS TYPE: Primary | ICD-10-CM

## 2023-11-15 DIAGNOSIS — Z00.00 INITIAL MEDICARE ANNUAL WELLNESS VISIT: Primary | ICD-10-CM

## 2023-11-15 DIAGNOSIS — K22.719 BARRETT'S ESOPHAGUS WITH DYSPLASIA: Primary | ICD-10-CM

## 2023-11-15 PROCEDURE — G0438 PPPS, INITIAL VISIT: HCPCS | Performed by: STUDENT IN AN ORGANIZED HEALTH CARE EDUCATION/TRAINING PROGRAM

## 2023-11-15 PROCEDURE — 1123F ACP DISCUSS/DSCN MKR DOCD: CPT | Performed by: STUDENT IN AN ORGANIZED HEALTH CARE EDUCATION/TRAINING PROGRAM

## 2023-11-15 RX ORDER — OMEPRAZOLE 40 MG/1
40 CAPSULE, DELAYED RELEASE ORAL
Qty: 90 CAPSULE | Refills: 1 | Status: SHIPPED | OUTPATIENT
Start: 2023-11-15

## 2023-11-15 ASSESSMENT — LIFESTYLE VARIABLES
HOW MANY STANDARD DRINKS CONTAINING ALCOHOL DO YOU HAVE ON A TYPICAL DAY: PATIENT DOES NOT DRINK
HOW OFTEN DO YOU HAVE A DRINK CONTAINING ALCOHOL: NEVER

## 2023-11-15 ASSESSMENT — PATIENT HEALTH QUESTIONNAIRE - PHQ9
1. LITTLE INTEREST OR PLEASURE IN DOING THINGS: 0
SUM OF ALL RESPONSES TO PHQ QUESTIONS 1-9: 0
SUM OF ALL RESPONSES TO PHQ9 QUESTIONS 1 & 2: 0
SUM OF ALL RESPONSES TO PHQ QUESTIONS 1-9: 0
2. FEELING DOWN, DEPRESSED OR HOPELESS: 0
SUM OF ALL RESPONSES TO PHQ QUESTIONS 1-9: 0
SUM OF ALL RESPONSES TO PHQ QUESTIONS 1-9: 0

## 2023-11-15 NOTE — PROGRESS NOTES
MD Lane   Cholecalciferol (VITAMIN D3) 50 MCG (2000 UT) CAPS 1 po daily with meal Yes Sophia Tay MD       [facility] (Including outside providers/suppliers regularly involved in providing care):   Patient Care Team:  Imelda Vasquez DO as PCP - General (Family Medicine)  Imelda Vasquez DO as PCP - Empaneled Provider     Reviewed and updated this visit:  Tobacco  Allergies  Meds  Problems  Med Hx  Surg Hx  Soc Hx  Fam Hx

## 2023-11-16 ENCOUNTER — CLINICAL DOCUMENTATION (OUTPATIENT)
Dept: SPIRITUAL SERVICES | Age: 68
End: 2023-11-16

## 2023-11-16 NOTE — ACP (ADVANCE CARE PLANNING)
Advance Care Planning   Ambulatory ACP Specialist Patient Outreach    Date:  11/16/2023    ACP Specialist:  Guanako Arthur    Outreach call to patient in follow-up to ACP Specialist referral from:Rani Swartz DO    [x] PCP  [] Provider   [] Ambulatory Care Management [] Other     For:                  [x] Advance Directive Assistance              [] Complete Portable DNR order              [] Complete POST/POLST/MOST              [] Code Status Discussion             [] Discuss Goals of Care             [] Early ACP Decision-Making              [] Other (Specify)    Date Referral Received:11/15/23    Next Step:   [] ACP scheduled conversation  [x] Outreach again in one week               [] Email / Mail [address]  [] Email / Mail Advance Directive   [] Closing referral.  Routing closure to referring provider/staff and to ACP Specialist . [] Closure letter mailed to patient with invitation to contact ACP Specialist if / when ready. [] Other (Specify here):         [x] At this time, Healthcare Decision Maker Is:  Advance Care Planning   Healthcare Decision Maker:    Primary Decision Maker: Aura Luna Brother/Sister - [phone]    Click here to complete [address] including selection of the Healthcare Decision Maker Relationship (ie \"Primary\"). [] Primary agent named in scanned advance directive. [x] Legal Next of Kin. [] Unable to determine legal decision maker at this time. Outreaches:       [x] 1st -  Date:  11/16/23               Intervention:  [] Spoke with Patient   [x] Left Voice mail [] Email / Mail    [] [employer]  [] Other [id number]) : Outcomes: Left detailed VM for Patient to return call. Will make another outreach in 1 week. [] 2nd -  Date:                 Intervention:  [] Spoke with Patient  [] Left Voice mail [] Email / Mail    [] [employer]  [] Other [id number]) :               Outcomes:                [] 3rd -

## 2023-12-07 ENCOUNTER — CLINICAL DOCUMENTATION (OUTPATIENT)
Dept: SPIRITUAL SERVICES | Age: 68
End: 2023-12-07

## 2023-12-07 NOTE — ACP (ADVANCE CARE PLANNING)
coordinating this. Pt aware and will await outreach from ACP team re: in person visit.          Thank you for this referral.

## 2023-12-11 ENCOUNTER — CLINICAL DOCUMENTATION (OUTPATIENT)
Dept: SPIRITUAL SERVICES | Age: 68
End: 2023-12-11

## 2023-12-13 ENCOUNTER — CLINICAL DOCUMENTATION (OUTPATIENT)
Dept: SPIRITUAL SERVICES | Age: 68
End: 2023-12-13

## 2023-12-23 ENCOUNTER — HOSPITAL ENCOUNTER (EMERGENCY)
Age: 68
Discharge: HOME | End: 2023-12-24
Attending: STUDENT IN AN ORGANIZED HEALTH CARE EDUCATION/TRAINING PROGRAM
Payer: COMMERCIAL

## 2023-12-23 VITALS
DIASTOLIC BLOOD PRESSURE: 89 MMHG | HEART RATE: 92 BPM | SYSTOLIC BLOOD PRESSURE: 123 MMHG | TEMPERATURE: 98.6 F | RESPIRATION RATE: 17 BRPM | OXYGEN SATURATION: 99 %

## 2023-12-23 DIAGNOSIS — N13.30 HYDRONEPHROSIS, UNSPECIFIED HYDRONEPHROSIS TYPE: ICD-10-CM

## 2023-12-23 DIAGNOSIS — R10.9 FLANK PAIN: ICD-10-CM

## 2023-12-23 DIAGNOSIS — N20.1 URETEROLITHIASIS: Primary | ICD-10-CM

## 2023-12-23 PROCEDURE — 83735 ASSAY OF MAGNESIUM: CPT

## 2023-12-23 PROCEDURE — 80053 COMPREHEN METABOLIC PANEL: CPT

## 2023-12-23 PROCEDURE — 85025 COMPLETE CBC W/AUTO DIFF WBC: CPT

## 2023-12-23 PROCEDURE — 81003 URINALYSIS AUTO W/O SCOPE: CPT

## 2023-12-23 PROCEDURE — 99284 EMERGENCY DEPT VISIT MOD MDM: CPT

## 2023-12-23 PROCEDURE — 36415 COLL VENOUS BLD VENIPUNCTURE: CPT

## 2023-12-23 PROCEDURE — 96374 THER/PROPH/DIAG INJ IV PUSH: CPT

## 2023-12-23 RX ORDER — KETOROLAC TROMETHAMINE 15 MG/ML
15 INJECTION, SOLUTION INTRAMUSCULAR; INTRAVENOUS ONCE
Status: COMPLETED | OUTPATIENT
Start: 2023-12-23 | End: 2023-12-24

## 2023-12-23 RX ORDER — 0.9 % SODIUM CHLORIDE 0.9 %
1000 INTRAVENOUS SOLUTION INTRAVENOUS ONCE
Status: COMPLETED | OUTPATIENT
Start: 2023-12-23 | End: 2023-12-24

## 2023-12-23 RX ORDER — ACETAMINOPHEN 500 MG
1000 TABLET ORAL ONCE
Status: COMPLETED | OUTPATIENT
Start: 2023-12-23 | End: 2023-12-24

## 2023-12-24 ENCOUNTER — APPOINTMENT (OUTPATIENT)
Dept: CT IMAGING | Age: 68
End: 2023-12-24
Payer: COMMERCIAL

## 2023-12-24 LAB
ALBUMIN SERPL-MCNC: 4 G/DL (ref 3.5–4.6)
ALP SERPL-CCNC: 98 U/L (ref 40–130)
ALT SERPL-CCNC: <5 U/L (ref 0–33)
ANION GAP SERPL CALCULATED.3IONS-SCNC: 11 MEQ/L (ref 9–15)
AST SERPL-CCNC: 8 U/L (ref 0–35)
BASOPHILS # BLD: 0 K/UL (ref 0–0.2)
BASOPHILS NFR BLD: 0.1 %
BILIRUB SERPL-MCNC: 0.7 MG/DL (ref 0.2–0.7)
BILIRUB UR QL STRIP: NEGATIVE
BUN SERPL-MCNC: 20 MG/DL (ref 8–23)
CALCIUM SERPL-MCNC: 9.9 MG/DL (ref 8.5–9.9)
CHLORIDE SERPL-SCNC: 103 MEQ/L (ref 95–107)
CLARITY UR: CLEAR
CO2 SERPL-SCNC: 23 MEQ/L (ref 20–31)
COLOR UR: YELLOW
CREAT SERPL-MCNC: 1.5 MG/DL (ref 0.5–0.9)
EOSINOPHIL # BLD: 0 K/UL (ref 0–0.7)
EOSINOPHIL NFR BLD: 0 %
ERYTHROCYTE [DISTWIDTH] IN BLOOD BY AUTOMATED COUNT: 13.7 % (ref 11.5–14.5)
GLOBULIN SER CALC-MCNC: 2.7 G/DL (ref 2.3–3.5)
GLUCOSE SERPL-MCNC: 128 MG/DL (ref 70–99)
GLUCOSE UR STRIP-MCNC: NEGATIVE MG/DL
HCT VFR BLD AUTO: 45 % (ref 37–47)
HGB BLD-MCNC: 14.7 G/DL (ref 12–16)
HGB UR QL STRIP: NEGATIVE
KETONES UR STRIP-MCNC: NEGATIVE MG/DL
LEUKOCYTE ESTERASE UR QL STRIP: NEGATIVE
LYMPHOCYTES # BLD: 1.1 K/UL (ref 1–4.8)
LYMPHOCYTES NFR BLD: 6.6 %
MAGNESIUM SERPL-MCNC: 2.2 MG/DL (ref 1.7–2.4)
MCH RBC QN AUTO: 30.8 PG (ref 27–31.3)
MCHC RBC AUTO-ENTMCNC: 32.7 % (ref 33–37)
MCV RBC AUTO: 94.3 FL (ref 79.4–94.8)
MONOCYTES # BLD: 1.3 K/UL (ref 0.2–0.8)
MONOCYTES NFR BLD: 7.7 %
NEUTROPHILS # BLD: 14.7 K/UL (ref 1.4–6.5)
NEUTS SEG NFR BLD: 85.2 %
NITRITE UR QL STRIP: NEGATIVE
PH UR STRIP: 5.5 [PH] (ref 5–9)
PLATELET # BLD AUTO: 383 K/UL (ref 130–400)
POTASSIUM SERPL-SCNC: 4.2 MEQ/L (ref 3.4–4.9)
PROT SERPL-MCNC: 6.7 G/DL (ref 6.3–8)
PROT UR STRIP-MCNC: NEGATIVE MG/DL
RBC # BLD AUTO: 4.77 M/UL (ref 4.2–5.4)
SODIUM SERPL-SCNC: 137 MEQ/L (ref 135–144)
SP GR UR STRIP: 1.01 (ref 1–1.03)
URINE REFLEX TO CULTURE: NORMAL
UROBILINOGEN UR STRIP-ACNC: 0.2 E.U./DL
WBC # BLD AUTO: 17.2 K/UL (ref 4.8–10.8)

## 2023-12-24 PROCEDURE — 2580000003 HC RX 258: Performed by: STUDENT IN AN ORGANIZED HEALTH CARE EDUCATION/TRAINING PROGRAM

## 2023-12-24 PROCEDURE — 6370000000 HC RX 637 (ALT 250 FOR IP): Performed by: STUDENT IN AN ORGANIZED HEALTH CARE EDUCATION/TRAINING PROGRAM

## 2023-12-24 PROCEDURE — 96374 THER/PROPH/DIAG INJ IV PUSH: CPT

## 2023-12-24 PROCEDURE — 6360000002 HC RX W HCPCS: Performed by: STUDENT IN AN ORGANIZED HEALTH CARE EDUCATION/TRAINING PROGRAM

## 2023-12-24 PROCEDURE — 74176 CT ABD & PELVIS W/O CONTRAST: CPT

## 2023-12-24 RX ORDER — KETOROLAC TROMETHAMINE 10 MG/1
10 TABLET, FILM COATED ORAL EVERY 6 HOURS PRN
Qty: 20 TABLET | Refills: 0 | Status: SHIPPED | OUTPATIENT
Start: 2023-12-24

## 2023-12-24 RX ORDER — ACETAMINOPHEN 500 MG
1000 TABLET ORAL EVERY 6 HOURS PRN
Qty: 60 TABLET | Refills: 0 | Status: SHIPPED | OUTPATIENT
Start: 2023-12-24

## 2023-12-24 RX ORDER — OXYCODONE HYDROCHLORIDE 5 MG/1
5 TABLET ORAL EVERY 6 HOURS PRN
Qty: 12 TABLET | Refills: 0 | Status: SHIPPED | OUTPATIENT
Start: 2023-12-24 | End: 2023-12-27

## 2023-12-24 RX ORDER — POLYETHYLENE GLYCOL 3350 17 G/17G
17 POWDER, FOR SOLUTION ORAL DAILY PRN
Qty: 510 G | Refills: 0 | Status: SHIPPED | OUTPATIENT
Start: 2023-12-24 | End: 2024-01-23

## 2023-12-24 RX ORDER — ONDANSETRON 4 MG/1
4 TABLET, ORALLY DISINTEGRATING ORAL 3 TIMES DAILY PRN
Qty: 21 TABLET | Refills: 0 | Status: SHIPPED | OUTPATIENT
Start: 2023-12-24

## 2023-12-24 RX ORDER — TAMSULOSIN HYDROCHLORIDE 0.4 MG/1
0.4 CAPSULE ORAL DAILY
Qty: 30 CAPSULE | Refills: 0 | Status: SHIPPED | OUTPATIENT
Start: 2023-12-24

## 2023-12-24 RX ORDER — OXYCODONE HYDROCHLORIDE 5 MG/1
5 TABLET ORAL ONCE
Status: COMPLETED | OUTPATIENT
Start: 2023-12-24 | End: 2023-12-24

## 2023-12-24 RX ADMIN — KETOROLAC TROMETHAMINE 15 MG: 15 INJECTION, SOLUTION INTRAMUSCULAR; INTRAVENOUS at 00:03

## 2023-12-24 RX ADMIN — OXYCODONE HYDROCHLORIDE 5 MG: 5 TABLET ORAL at 02:07

## 2023-12-24 RX ADMIN — ACETAMINOPHEN 1000 MG: 500 TABLET ORAL at 00:03

## 2023-12-24 RX ADMIN — SODIUM CHLORIDE 1000 ML: 9 INJECTION, SOLUTION INTRAVENOUS at 00:02

## 2023-12-24 NOTE — ED NOTES
The following labs were labeled with appropriate pt sticker and tubed to lab:     [] Blue     [x] Lavender   [] on ice  [x] Green/yellow  [] Green/black [] on ice  [] [place]  [] on ice  [] Yellow  [] Red  [] Pink  [] Type/ Screen  [] ABG  [] VBG    [] COVID-19 swab    [] Rapid  [] PCR  [] Flu swab  [] Peds Viral Panel     [x] Urine Sample  [] Fecal Sample  [] Pelvic Cultures  [] Blood Cultures  [] X 2  [] STREP Cultures  [] Wound Cultures

## 2023-12-24 NOTE — ED TRIAGE NOTES
Pt arrived to triage via private vehicle. Pt c/o left sided flank pain that radiates to LLQ. Pt denies hx of kidney stones. Pt denies any urinary symptoms. Pt states the pain began suddenly.

## 2023-12-26 ENCOUNTER — CLINICAL DOCUMENTATION (OUTPATIENT)
Dept: SPIRITUAL SERVICES | Age: 68
End: 2023-12-26

## 2023-12-26 ENCOUNTER — TELEPHONE (OUTPATIENT)
Dept: UROLOGY | Age: 68
End: 2023-12-26

## 2023-12-26 DIAGNOSIS — N20.0 KIDNEY STONE: Primary | ICD-10-CM

## 2023-12-26 NOTE — ACP (ADVANCE CARE PLANNING)
Advance Care Planning   Ambulatory ACP Specialist Patient Outreach    Date:  12/26/2023    ACP Specialist:  Mary Stevens    Outreach call to patient in follow-up to ACP Specialist referral from:Miguel Swartz DO    [] PCP  [x] Provider   [] Ambulatory Care Management [] Other     For:                  [x] Advance Directive Assistance              [] Complete Portable DNR order              [] Complete POST/POLST/MOST              [] Code Status Discussion             [] Discuss Goals of Care             [] Early ACP Decision-Making              [] Other (Specify)    Date Referral Received:11/16/23    Next Step:   [] ACP scheduled conversation  [] Outreach again in one week               [] Email / Mail [address]  [] Email / Mail Advance Directive   [] Closing referral.  Routing closure to referring provider/staff and to ACP Specialist . [] Closure letter mailed to patient with invitation to contact ACP Specialist if / when ready. [] Other (Specify here):         [x] At this time, Healthcare Decision Maker Is:        [] Primary agent named in scanned advance directive. [] Legal Next of Kin. [x] Unable to determine legal decision maker at this time. Outreaches:       [] 1st -  Date:                 Intervention:  [] Spoke with Patient   [] Left Voice mail [] Email / Mail    [] [employer]  [] Other [id number]) : Outcomes:           [] 2nd -  Date:                 Intervention:  [] Spoke with Patient  [] Left Voice mail [] Email / Mail    [] [employer]  [] Other [id number]) : Outcomes:                [x] 3rd -  Date:  12/26/23              Intervention:  [] Spoke with Patient   [x] Left Voice mail [] Email / Mail    [] [employer]  [] Other [id number]) : Outcomes:Left a message. Will wait for pt to respond. []  Additional Outreach -  Date:     (Specify Dates & special circumstances): Outcomes:          Thank you for this referral.

## 2023-12-26 NOTE — ACP (ADVANCE CARE PLANNING)
Advance Care Planning   Ambulatory ACP Specialist Patient Outreach    Date:  12/11/2023    ACP Specialist:  Qi Thompson    Outreach call to patient in follow-up to ACP Specialist referral from:Eben Swartz DO    [] PCP  [x] Provider   [] Ambulatory Care Management [] Other     For:                  [x] Advance Directive Assistance              [] Complete Portable DNR order              [] Complete POST/POLST/MOST              [] Code Status Discussion             [] Discuss Goals of Care             [] Early ACP Decision-Making              [] Other (Specify)    Date Referral Received:11/16/23    Next Step:   [] ACP scheduled conversation  [x] Outreach again in one week               [] Email / Mail [address]  [] Email / Mail Advance Directive   [] Closing referral.  Routing closure to referring provider/staff and to ACP Specialist . [] Closure letter mailed to patient with invitation to contact ACP Specialist if / when ready. [] Other (Specify here):         [x] At this time, Healthcare Decision Maker Is:        [] Primary agent named in scanned advance directive. [] Legal Next of Kin. [x] Unable to determine legal decision maker at this time. Outreaches:       [x] 1st -  Date:  12/11/23               Intervention:  [] Spoke with Patient   [x] Left Voice mail [] Email / Mail    [] [employer]  [] Other [id number]) : Outcomes:Left a message, will follow-up. [] 2nd -  Date:                 Intervention:  [] Spoke with Patient  [] Left Voice mail [] Email / Mail    [] [employer]  [] Other [id number]) : Outcomes:                [] 3rd -  Date:                Intervention:  [] Spoke with Patient   [] Left Voice mail [] Email / Mail    [] [employer]  [] Other [id number]) : Outcomes:           []  Additional Outreach -  Date:     (Specify Dates & special circumstances): Outcomes:          Thank you for this referral.

## 2023-12-26 NOTE — ACP (ADVANCE CARE PLANNING)
Advance Care Planning   Ambulatory ACP Specialist Patient Outreach    Date:  12/26/2023    ACP Specialist:  Nawaf Lopez    Outreach call to patient in follow-up to ACP Specialist referral from:Jagdeep Swartz DO    [] PCP  [x] Provider   [] Ambulatory Care Management [] Other     For:                  [x] Advance Directive Assistance              [] Complete Portable DNR order              [] Complete POST/POLST/MOST              [] Code Status Discussion             [] Discuss Goals of Care             [] Early ACP Decision-Making              [] Other (Specify)    Date Referral Received:11/16/23    Next Step:   [] ACP scheduled conversation  [] Outreach again in one week               [] Email / Mail [address]  [] Email / Mail Advance Directive   [x] Closing referral.  Routing closure to referring provider/staff and to ACP Specialist . [] Closure letter mailed to patient with invitation to contact ACP Specialist if / when ready. [] Other (Specify here):         [x] At this time, Healthcare Decision Maker Is:        [] Primary agent named in scanned advance directive. [] Legal Next of Kin. [x] Unable to determine legal decision maker at this time. Outreaches:       [] 1st -  Date:                 Intervention:  [] Spoke with Patient   [] Left Voice mail [] Email / Mail    [] Intervention [employer]  [] Other [id number]) : Outcomes:           [] 2nd -  Date:                 Intervention:  [] Spoke with Patient  [] Left Voice mail [] Email / Mail    [] Intervention [employer]  [] Other [id number]) : Outcomes:                [] 3rd -  Date:                Intervention:  [] Spoke with Patient   [] Left Voice mail [] Email / Mail    [] Intervention [employer]  [] Other [id number]) : Outcomes:           [x]  Additional Outreach -  Date:  12/26/23   (Specify Dates & special circumstances):     Outcomes: Phone conversation with patient who stated that ACP is not a priority for her at this time, as other

## 2023-12-26 NOTE — PROGRESS NOTES
Advance Care Planning   Ambulatory ACP Specialist Patient Outreach    Date:  12/13/2023    ACP Specialist:  Taylor Acharya    Outreach call to patient in follow-up to ACP Specialist referral from:Lb Swartz DO    [] PCP  [x] Provider   [] Ambulatory Care Management [] Other     For:                  [x] Advance Directive Assistance              [] Complete Portable DNR order              [] Complete POST/POLST/MOST              [] Code Status Discussion             [] Discuss Goals of Care             [] Early ACP Decision-Making              [] Other (Specify)    Date Referral Received:11/16/23    Next Step:   [] ACP scheduled conversation  [x] Outreach again in one week               [] Email / Mail [address]  [] Email / Mail Advance Directive   [] Closing referral.  Routing closure to referring provider/staff and to ACP Specialist . [] Closure letter mailed to patient with invitation to contact ACP Specialist if / when ready. [] Other (Specify here):         [x] At this time, Healthcare Decision Maker Is:        [] Primary agent named in scanned advance directive. [] Legal Next of Kin. [x] Unable to determine legal decision maker at this time. Outreaches:       [] 1st -  Date:                 Intervention:  [] Spoke with Patient   [] Left Voice mail [] Email / Mail    [] [employer]  [] Other [id number]) : Outcomes:           [x] 2nd -  Date:  12/13/23               Intervention:  [x] Spoke with Patient  [] Left Voice mail [] Email / Mail    [] [employer]  [] Other [id number]) : Outcomes:Patient would like to connect the following week. She feels unable to move forward right now, as she has too much going on.                 [] 3rd -  Date:                Intervention:  [] Spoke with Patient   [] Left Voice mail [] Email / Mail    [] [employer]  [] Other [id number]) :        Outcomes:           []  Additional Outreach -  Date:     (Specify Dates & special

## 2024-01-31 ENCOUNTER — OFFICE VISIT (OUTPATIENT)
Dept: UROLOGY | Age: 69
End: 2024-01-31
Payer: COMMERCIAL

## 2024-01-31 ENCOUNTER — HOSPITAL ENCOUNTER (OUTPATIENT)
Dept: GENERAL RADIOLOGY | Age: 69
Discharge: HOME | End: 2024-02-02
Attending: PHYSICIAN ASSISTANT
Payer: COMMERCIAL

## 2024-01-31 VITALS
HEART RATE: 64 BPM | OXYGEN SATURATION: 99 % | SYSTOLIC BLOOD PRESSURE: 124 MMHG | WEIGHT: 160 LBS | BODY MASS INDEX: 25.11 KG/M2 | DIASTOLIC BLOOD PRESSURE: 72 MMHG | HEIGHT: 67 IN

## 2024-01-31 DIAGNOSIS — N20.0 KIDNEY STONE: ICD-10-CM

## 2024-01-31 DIAGNOSIS — N20.0 KIDNEY STONE: Primary | ICD-10-CM

## 2024-01-31 PROCEDURE — 99203 OFFICE O/P NEW LOW 30 MIN: CPT | Performed by: PHYSICIAN ASSISTANT

## 2024-01-31 PROCEDURE — 74018 RADEX ABDOMEN 1 VIEW: CPT

## 2024-01-31 PROCEDURE — 1123F ACP DISCUSS/DSCN MKR DOCD: CPT | Performed by: PHYSICIAN ASSISTANT

## 2024-01-31 ASSESSMENT — ENCOUNTER SYMPTOMS: APNEA: 0

## 2024-01-31 NOTE — PROGRESS NOTES
Subjective:      Patient ID: Mei Schneider is a 68 y.o. female    HPI  68 year old female who presents after being diagnosed with a 5-6 mm calculus at the left ureterovesicular junction with mild hydronephrosis on 12/23/23. She has not felt any pain or discomfort since 12/25/23. Denies gross hematuria or dysuria    Past Medical History:   Diagnosis Date    Smith esophagus 2016, 2018    Dr Espinosa, surveillance q 3 years    Benign gastric polyp     DJD (degenerative joint disease) of knee     Fibromyalgia     GERD (gastroesophageal reflux disease) 2008    H/O bone density study 11/24/2012    osteopenia, was on Reclast    History of colonoscopy with polypectomy 2016, 2018    Dr FIGUEROA, tubulovillous adenoma, tubular adenoma, repeat 2023    History of vitamin D deficiency     Hyperlipidemia     Inconclusive mammogram 12/2018    repeat R breast in 6 months    Plantar fasciitis, bilateral     Reactive airway disease     PFT 2016     Past Surgical History:   Procedure Laterality Date    ABLATION OF DYSRHYTHMIC FOCUS  1993    attempted ablation     BREAST BIOPSY  1990    benign    CATARACT REMOVAL WITH IMPLANT Bilateral     Dr at the [facility]    COLONOSCOPY  9/14/15    w/polypectomy     ENDOSCOPY, COLON, DIAGNOSTIC      HYSTERECTOMY, TOTAL ABDOMINAL (CERVIX REMOVED)      fibroids, endometriosis, age 29    LAPAROSCOPY      PARATHYROID GLAND SURGERY      R side    TX COLORECTAL SCRN; HI RISK IND N/A 10/1/2018    COLONOSCOPY performed by Joselito Espinosa MD at [facility]    TX ESOPHAGOGASTRODUODENOSCOPY TRANSORAL DIAGNOSTIC N/A 10/1/2018    EGD ESOPHAGOGASTRODUODENOSCOPY WITH DILATION performed by Joselito Espinosa MD at [facility]    TONSILLECTOMY AND ADENOIDECTOMY      UPPER GASTROINTESTINAL ENDOSCOPY  9/14/15    w/bx,polypectomy     UPPER GASTROINTESTINAL ENDOSCOPY N/A 12/23/2021    EGD ESOPHAGOGASTRODUODENOSCOPY performed by Hilton Dyson MD at [facility]     Social History

## 2024-02-13 ENCOUNTER — HOSPITAL ENCOUNTER (OUTPATIENT)
Dept: CT IMAGING | Age: 69
Discharge: HOME | End: 2024-02-15
Attending: PHYSICIAN ASSISTANT
Payer: COMMERCIAL

## 2024-02-13 DIAGNOSIS — N20.0 KIDNEY STONE: ICD-10-CM

## 2024-02-13 PROCEDURE — 74176 CT ABD & PELVIS W/O CONTRAST: CPT

## 2024-02-20 ENCOUNTER — OFFICE VISIT (OUTPATIENT)
Dept: UROLOGY | Age: 69
End: 2024-02-20
Payer: COMMERCIAL

## 2024-02-20 VITALS
HEIGHT: 67 IN | HEART RATE: 75 BPM | DIASTOLIC BLOOD PRESSURE: 88 MMHG | WEIGHT: 160 LBS | BODY MASS INDEX: 25.11 KG/M2 | SYSTOLIC BLOOD PRESSURE: 132 MMHG

## 2024-02-20 DIAGNOSIS — N20.0 KIDNEY STONE: Primary | ICD-10-CM

## 2024-02-20 PROCEDURE — 1123F ACP DISCUSS/DSCN MKR DOCD: CPT | Performed by: PHYSICIAN ASSISTANT

## 2024-02-20 PROCEDURE — 99213 OFFICE O/P EST LOW 20 MIN: CPT | Performed by: PHYSICIAN ASSISTANT

## 2024-02-20 ASSESSMENT — ENCOUNTER SYMPTOMS: APNEA: 0

## 2024-02-20 NOTE — PROGRESS NOTES
Subjective:      Patient ID: Mei Schneider is a 68 y.o. female    HPI68 year old female who presents after being diagnosed with a 5-6 mm calculus at the left ureterovesicular junction with mild hydronephrosis on 12/23/23. She has not felt any pain or discomfort since 12/25/23. Denies gross hematuria or dysuria. Stone CT on 2/13/24 shows resolution of previous stone and resolution of left hydronephrosis. The is a small 2-3 mm left and a punctate 1 mm intrarenal stones which are nonobstructive    Past Medical History:   Diagnosis Date    Smith esophagus 2016, 2018    Dr Espinosa, surveillance q 3 years    Benign gastric polyp     DJD (degenerative joint disease) of knee     Fibromyalgia     GERD (gastroesophageal reflux disease) 2008    H/O bone density study 11/24/2012    osteopenia, was on Reclast    History of colonoscopy with polypectomy 2016, 2018    Dr FIGUEROA, tubulovillous adenoma, tubular adenoma, repeat 2023    History of vitamin D deficiency     Hyperlipidemia     Inconclusive mammogram 12/2018    repeat R breast in 6 months    Plantar fasciitis, bilateral     Reactive airway disease     PFT 2016     Past Surgical History:   Procedure Laterality Date    ABLATION OF DYSRHYTHMIC FOCUS  1993    attempted ablation     BREAST BIOPSY  1990    benign    CATARACT REMOVAL WITH IMPLANT Bilateral     Dr at the [facility]    COLONOSCOPY  9/14/15    w/polypectomy     ENDOSCOPY, COLON, DIAGNOSTIC      HYSTERECTOMY, TOTAL ABDOMINAL (CERVIX REMOVED)      fibroids, endometriosis, age 29    LAPAROSCOPY      PARATHYROID GLAND SURGERY      R side    AR COLORECTAL SCRN; HI RISK IND N/A 10/1/2018    COLONOSCOPY performed by Joselito Espinosa MD at [facility]    AR ESOPHAGOGASTRODUODENOSCOPY TRANSORAL DIAGNOSTIC N/A 10/1/2018    EGD ESOPHAGOGASTRODUODENOSCOPY WITH DILATION performed by Joselito Espniosa MD at [facility]    TONSILLECTOMY AND ADENOIDECTOMY      UPPER GASTROINTESTINAL ENDOSCOPY  9/14/15

## 2024-03-08 ENCOUNTER — HOSPITAL ENCOUNTER (OUTPATIENT)
Dept: WOMENS IMAGING | Age: 69
End: 2024-03-08
Payer: COMMERCIAL

## 2024-03-08 DIAGNOSIS — Z12.31 ENCOUNTER FOR SCREENING MAMMOGRAM FOR MALIGNANT NEOPLASM OF BREAST: ICD-10-CM

## 2024-03-08 PROCEDURE — 77063 BREAST TOMOSYNTHESIS BI: CPT

## 2024-03-19 RX ORDER — POLYETHYLENE GLYCOL 3350, SODIUM CHLORIDE, SODIUM BICARBONATE, POTASSIUM CHLORIDE 420; 11.2; 5.72; 1.48 G/4L; G/4L; G/4L; G/4L
4000 POWDER, FOR SOLUTION ORAL ONCE
Qty: 4000 ML | Refills: 0 | Status: SHIPPED | OUTPATIENT
Start: 2024-03-19 | End: 2024-03-19

## 2024-03-29 ENCOUNTER — ANESTHESIA EVENT (OUTPATIENT)
Dept: ENDOSCOPY | Age: 69
End: 2024-03-29
Payer: COMMERCIAL

## 2024-03-29 NOTE — ANESTHESIA PRE PROCEDURE
Department of Anesthesiology  Preprocedure Note       Name:  Mei Schneider   Age:  68 y.o.  :  1955                                          MRN:  [id number]         Date:  3/29/2024      Surgeon: Surgeon(s):  Hilton Dyson MD    Procedure: Procedure(s):  COLORECTAL CANCER SCREENING, HIGH RISK    Medications prior to admission:   Prior to Admission medications    Medication Sig Start Date End Date Taking? Authorizing Provider   acetaminophen (TYLENOL) 500 MG tablet Take 2 tablets by mouth every 6 hours as needed for Pain or Fever 23   Ervin Alicia MD   tamsulosin (FLOMAX) 0.4 MG capsule Take 1 capsule by mouth daily 23   Ervin Alicia MD   omeprazole (PRILOSEC) 40 MG delayed release capsule Take 1 capsule by mouth every morning (before breakfast) 11/15/23   Dannielle Swartz DO   Handicap Placard MISC by Does not apply route For medical condition lasting longer than 5 years.  Start date- 11/15/2023  End date- 11/15/2028 11/15/23   Dannielle Swartz DO   Lactobacillus (PROBIOTIC ACIDOPHILUS PO) Take by mouth    Provider, MD Lane   Cholecalciferol (VITAMIN D3) 50 MCG ( UT) CAPS 1 po daily with meal 22   Rere Dial MD       Current medications:    No current facility-administered medications for this encounter.     Current Outpatient Medications   Medication Sig Dispense Refill    acetaminophen (TYLENOL) 500 MG tablet Take 2 tablets by mouth every 6 hours as needed for Pain or Fever 60 tablet 0    tamsulosin (FLOMAX) 0.4 MG capsule Take 1 capsule by mouth daily 30 capsule 0    omeprazole (PRILOSEC) 40 MG delayed release capsule Take 1 capsule by mouth every morning (before breakfast) 90 capsule 1    Handicap Placard MISC by Does not apply route For medical condition lasting longer than 5 years.  Start date- 11/15/2023  End date- 11/15/2028 1 each 0    Lactobacillus (PROBIOTIC ACIDOPHILUS PO) Take by mouth      Cholecalciferol (VITAMIN D3) 50 MCG (

## 2024-04-01 ENCOUNTER — HOSPITAL ENCOUNTER (OUTPATIENT)
Age: 69
Setting detail: OUTPATIENT SURGERY
Discharge: HOME | End: 2024-04-01
Attending: INTERNAL MEDICINE | Admitting: INTERNAL MEDICINE
Payer: COMMERCIAL

## 2024-04-01 ENCOUNTER — ANESTHESIA (OUTPATIENT)
Dept: ENDOSCOPY | Age: 69
End: 2024-04-01
Payer: COMMERCIAL

## 2024-04-01 ENCOUNTER — PREP FOR PROCEDURE (OUTPATIENT)
Dept: GASTROENTEROLOGY | Age: 69
End: 2024-04-01

## 2024-04-01 VITALS
WEIGHT: 156 LBS | BODY MASS INDEX: 24.48 KG/M2 | TEMPERATURE: 96.6 F | RESPIRATION RATE: 16 BRPM | DIASTOLIC BLOOD PRESSURE: 68 MMHG | HEIGHT: 67 IN | HEART RATE: 66 BPM | OXYGEN SATURATION: 93 % | SYSTOLIC BLOOD PRESSURE: 129 MMHG

## 2024-04-01 DIAGNOSIS — Z86.010 HISTORY OF COLON POLYPS: ICD-10-CM

## 2024-04-01 PROCEDURE — 3609027000 HC COLONOSCOPY: Performed by: INTERNAL MEDICINE

## 2024-04-01 PROCEDURE — 2580000003 HC RX 258

## 2024-04-01 PROCEDURE — 3700000000 HC ANESTHESIA ATTENDED CARE: Performed by: INTERNAL MEDICINE

## 2024-04-01 PROCEDURE — 3700000001 HC ADD 15 MINUTES (ANESTHESIA): Performed by: INTERNAL MEDICINE

## 2024-04-01 PROCEDURE — 45385 COLONOSCOPY W/LESION REMOVAL: CPT | Performed by: INTERNAL MEDICINE

## 2024-04-01 PROCEDURE — 88305 TISSUE EXAM BY PATHOLOGIST: CPT

## 2024-04-01 PROCEDURE — 6370000000 HC RX 637 (ALT 250 FOR IP): Performed by: INTERNAL MEDICINE

## 2024-04-01 PROCEDURE — 7100000010 HC PHASE II RECOVERY - FIRST 15 MIN: Performed by: INTERNAL MEDICINE

## 2024-04-01 PROCEDURE — 6360000002 HC RX W HCPCS: Performed by: REGISTERED NURSE

## 2024-04-01 PROCEDURE — 45380 COLONOSCOPY AND BIOPSY: CPT | Performed by: INTERNAL MEDICINE

## 2024-04-01 PROCEDURE — 7100000011 HC PHASE II RECOVERY - ADDTL 15 MIN: Performed by: INTERNAL MEDICINE

## 2024-04-01 PROCEDURE — 2500000003 HC RX 250 WO HCPCS: Performed by: REGISTERED NURSE

## 2024-04-01 PROCEDURE — 2709999900 HC NON-CHARGEABLE SUPPLY: Performed by: INTERNAL MEDICINE

## 2024-04-01 PROCEDURE — 2580000003 HC RX 258: Performed by: INTERNAL MEDICINE

## 2024-04-01 RX ORDER — SODIUM CHLORIDE 9 MG/ML
INJECTION, SOLUTION INTRAVENOUS PRN
Status: CANCELLED | OUTPATIENT
Start: 2024-04-01

## 2024-04-01 RX ORDER — PROPOFOL 10 MG/ML
INJECTION, EMULSION INTRAVENOUS PRN
Status: DISCONTINUED | OUTPATIENT
Start: 2024-04-01 | End: 2024-04-01 | Stop reason: SDUPTHER

## 2024-04-01 RX ORDER — SODIUM CHLORIDE 0.9 % (FLUSH) 0.9 %
5-40 SYRINGE (ML) INJECTION PRN
Status: CANCELLED | OUTPATIENT
Start: 2024-04-01

## 2024-04-01 RX ORDER — SODIUM CHLORIDE 9 MG/ML
INJECTION, SOLUTION INTRAVENOUS CONTINUOUS
Status: CANCELLED | OUTPATIENT
Start: 2024-04-01

## 2024-04-01 RX ORDER — SODIUM CHLORIDE 0.9 % (FLUSH) 0.9 %
5-40 SYRINGE (ML) INJECTION EVERY 12 HOURS SCHEDULED
Status: DISCONTINUED | OUTPATIENT
Start: 2024-04-01 | End: 2024-04-01 | Stop reason: HOSPADM

## 2024-04-01 RX ORDER — MAGNESIUM HYDROXIDE 1200 MG/15ML
LIQUID ORAL PRN
Status: DISCONTINUED | OUTPATIENT
Start: 2024-04-01 | End: 2024-04-01 | Stop reason: ALTCHOICE

## 2024-04-01 RX ORDER — SODIUM CHLORIDE 0.9 % (FLUSH) 0.9 %
5-40 SYRINGE (ML) INJECTION PRN
Status: DISCONTINUED | OUTPATIENT
Start: 2024-04-01 | End: 2024-04-01 | Stop reason: HOSPADM

## 2024-04-01 RX ORDER — SODIUM CHLORIDE 0.9 % (FLUSH) 0.9 %
5-40 SYRINGE (ML) INJECTION EVERY 12 HOURS SCHEDULED
Status: CANCELLED | OUTPATIENT
Start: 2024-04-01

## 2024-04-01 RX ORDER — SIMETHICONE 40MG/0.6ML
SUSPENSION, DROPS(FINAL DOSAGE FORM)(ML) ORAL PRN
Status: DISCONTINUED | OUTPATIENT
Start: 2024-04-01 | End: 2024-04-01 | Stop reason: ALTCHOICE

## 2024-04-01 RX ORDER — SODIUM CHLORIDE 9 MG/ML
INJECTION, SOLUTION INTRAVENOUS PRN
Status: DISCONTINUED | OUTPATIENT
Start: 2024-04-01 | End: 2024-04-01 | Stop reason: HOSPADM

## 2024-04-01 RX ORDER — LIDOCAINE HYDROCHLORIDE 20 MG/ML
INJECTION, SOLUTION INFILTRATION; PERINEURAL PRN
Status: DISCONTINUED | OUTPATIENT
Start: 2024-04-01 | End: 2024-04-01 | Stop reason: SDUPTHER

## 2024-04-01 RX ORDER — SODIUM CHLORIDE 9 MG/ML
INJECTION, SOLUTION INTRAVENOUS CONTINUOUS
Status: DISCONTINUED | OUTPATIENT
Start: 2024-04-01 | End: 2024-04-01 | Stop reason: HOSPADM

## 2024-04-01 RX ORDER — SODIUM CHLORIDE 9 MG/ML
INJECTION, SOLUTION INTRAVENOUS
Status: COMPLETED
Start: 2024-04-01 | End: 2024-04-01

## 2024-04-01 RX ORDER — SIMETHICONE 40MG/0.6ML
SUSPENSION, DROPS(FINAL DOSAGE FORM)(ML) ORAL
Status: DISCONTINUED
Start: 2024-04-01 | End: 2024-04-01 | Stop reason: HOSPADM

## 2024-04-01 RX ADMIN — PROPOFOL 50 MG: 10 INJECTION, EMULSION INTRAVENOUS at 09:42

## 2024-04-01 RX ADMIN — PROPOFOL 50 MG: 10 INJECTION, EMULSION INTRAVENOUS at 09:47

## 2024-04-01 RX ADMIN — PROPOFOL 50 MG: 10 INJECTION, EMULSION INTRAVENOUS at 09:39

## 2024-04-01 RX ADMIN — SODIUM CHLORIDE 500 ML: 9 INJECTION, SOLUTION INTRAVENOUS at 08:54

## 2024-04-01 RX ADMIN — PROPOFOL 50 MG: 10 INJECTION, EMULSION INTRAVENOUS at 09:36

## 2024-04-01 RX ADMIN — PROPOFOL 50 MG: 10 INJECTION, EMULSION INTRAVENOUS at 09:52

## 2024-04-01 RX ADMIN — PROPOFOL 150 MG: 10 INJECTION, EMULSION INTRAVENOUS at 09:35

## 2024-04-01 RX ADMIN — LIDOCAINE HYDROCHLORIDE 60 MG: 20 INJECTION, SOLUTION INFILTRATION; PERINEURAL at 09:34

## 2024-04-01 RX ADMIN — PROPOFOL 50 MG: 10 INJECTION, EMULSION INTRAVENOUS at 09:49

## 2024-04-01 RX ADMIN — PROPOFOL 50 MG: 10 INJECTION, EMULSION INTRAVENOUS at 09:56

## 2024-04-01 ASSESSMENT — PAIN - FUNCTIONAL ASSESSMENT
PAIN_FUNCTIONAL_ASSESSMENT: 0-10
PAIN_FUNCTIONAL_ASSESSMENT: 0-10

## 2024-04-01 NOTE — ANESTHESIA POSTPROCEDURE EVALUATION
Department of Anesthesiology  Postprocedure Note    Patient: Mei Schneider  MRN: [id number]  YOB: 1955  Date of evaluation: 4/1/2024    Procedure Summary       Date: 04/01/24 Room / Location: [facility] OR 02 / [facility]    Anesthesia Start: 0930 Anesthesia Stop: 0958    Procedure: COLORECTAL CANCER SCREENING, HIGH RISK with polypectomy Diagnosis:       History of colon polyps      (History of colon polyps [Z86.010])    Surgeons: Hilton Dyson MD Responsible Provider: Jose Crow APRN - CRNA    Anesthesia Type: MAC ASA Status: 2            Anesthesia Type: No value filed.    Nisha Phase I: Nisha Score: 10    Nisha Phase II: Nisha Score: 6    Anesthesia Post Evaluation    Patient location during evaluation: bedside  Patient participation: complete - patient participated  Level of consciousness: awake and awake and alert  Airway patency: patent  Nausea & Vomiting: no nausea and no vomiting  Cardiovascular status: blood pressure returned to baseline and hemodynamically stable  Respiratory status: acceptable  Hydration status: euvolemic  Pain management: adequate        No notable events documented.

## 2024-04-01 NOTE — H&P
Patient Name: Mei Schneider  : 1955  MRN: [id number]  DATE: 24      ENDOSCOPY  History and Physical    Procedure:    [] Diagnostic Colonoscopy       [x] Screening Colonoscopy  [] EGD      [] ERCP      [] EUS       [] Other    [x] Previous office notes/History and Physical reviewed from the patients chart. Please see EMR for further details of HPI. I have examined the patient's status immediately prior to the procedure and:      Indications/HPI:    []Abdominal Pain   []Cancer- GI/Lung  []Fhx of colon CA  []History of Polyps   []Smith’s   []Melena  []Abnormal Imaging   []Dysphagia    []Persistent Pneumonia  []Anemia   []Food Impaction  [x]History of Polyps  []GI Bleed   []Pulmonary nodule/Mass  []Change in bowel habits  []Heartburn/Reflux  []Rectal Bleed (BRBPR)  []Chest Pain - Non Cardiac  []Heme (+) Stool  []Ulcers  []Constipation   []Hemoptysis   []Varices  []Diarrhea   []Hypoxemia  []Nausea/Vomiting   []Screening   []Crohns/Colitis  []Other:    Anesthesia:   [x] MAC [] Moderate Sedation   [] General   [] None     ROS: 12 pt Review of Symptoms was negative unless mentioned above    Medications:   Prior to Admission medications    Medication Sig Start Date End Date Taking? Authorizing Provider   acetaminophen (TYLENOL) 500 MG tablet Take 2 tablets by mouth every 6 hours as needed for Pain or Fever 23   Ervin Alicia MD   tamsulosin (FLOMAX) 0.4 MG capsule Take 1 capsule by mouth daily  Patient not taking: Reported on 23   Ervin Alicia MD   omeprazole (PRILOSEC) 40 MG delayed release capsule Take 1 capsule by mouth every morning (before breakfast) 11/15/23   Dannielle Swartz DO   Handicap Placard MISC by Does not apply route For medical condition lasting longer than 5 years.  Start date- 11/15/2023  End date- 11/15/2028 11/15/23   Dannielle Swartz DO   Lactobacillus (PROBIOTIC ACIDOPHILUS PO) Take by mouth    Provider, MD Lane   Cholecalciferol (VITAMIN D3)

## 2024-07-13 DIAGNOSIS — K22.719 BARRETT'S ESOPHAGUS WITH DYSPLASIA: ICD-10-CM

## 2024-07-15 RX ORDER — OMEPRAZOLE 40 MG/1
40 CAPSULE, DELAYED RELEASE ORAL
Qty: 90 CAPSULE | Refills: 1 | Status: SHIPPED | OUTPATIENT
Start: 2024-07-15

## 2024-07-15 NOTE — TELEPHONE ENCOUNTER
Please approve or deny request. Thank you!    Rx requested:  Requested Prescriptions     Pending Prescriptions Disp Refills    omeprazole (PRILOSEC) 40 MG delayed release capsule 90 capsule 1     Sig: Take 1 capsule by mouth every morning (before breakfast)         Last Office Visit:   11/15/2023      Next Visit Date:  Future Appointments   Date Time Provider Department Center   9/27/2024  8:00 AM Dannielle Swartz DO [facility]   11/18/2024  8:30 AM Dannielle Swartz DO [facility]  [facility]

## 2024-09-22 ENCOUNTER — APPOINTMENT (OUTPATIENT)
Dept: GENERAL RADIOLOGY | Age: 69
DRG: 310 | End: 2024-09-22
Payer: COMMERCIAL

## 2024-09-22 ENCOUNTER — HOSPITAL ENCOUNTER (INPATIENT)
Age: 69
LOS: 2 days | Discharge: HOME | DRG: 310 | End: 2024-09-24
Attending: STUDENT IN AN ORGANIZED HEALTH CARE EDUCATION/TRAINING PROGRAM | Admitting: INTERNAL MEDICINE
Payer: COMMERCIAL

## 2024-09-22 DIAGNOSIS — I49.3 PVC (PREMATURE VENTRICULAR CONTRACTION): ICD-10-CM

## 2024-09-22 DIAGNOSIS — I42.9 CARDIOMYOPATHY, UNSPECIFIED TYPE (HCC): ICD-10-CM

## 2024-09-22 DIAGNOSIS — R00.2 PALPITATIONS: Primary | ICD-10-CM

## 2024-09-22 DIAGNOSIS — R06.02 SHORTNESS OF BREATH: ICD-10-CM

## 2024-09-22 LAB
ANION GAP SERPL CALCULATED.3IONS-SCNC: 10 MEQ/L (ref 9–15)
BASOPHILS # BLD: 0.1 K/UL (ref 0–0.2)
BASOPHILS NFR BLD: 0.6 %
BNP BLD-MCNC: 648 PG/ML
BUN SERPL-MCNC: 23 MG/DL (ref 8–23)
CALCIUM SERPL-MCNC: 9 MG/DL (ref 8.5–9.9)
CHLORIDE SERPL-SCNC: 109 MEQ/L (ref 95–107)
CO2 SERPL-SCNC: 23 MEQ/L (ref 20–31)
CREAT SERPL-MCNC: 1.29 MG/DL (ref 0.5–0.9)
EOSINOPHIL # BLD: 0.1 K/UL (ref 0–0.7)
EOSINOPHIL NFR BLD: 1.6 %
ERYTHROCYTE [DISTWIDTH] IN BLOOD BY AUTOMATED COUNT: 13.8 % (ref 11.5–14.5)
GLUCOSE SERPL-MCNC: 122 MG/DL (ref 70–99)
HCT VFR BLD AUTO: 48 % (ref 37–47)
HGB BLD-MCNC: 15.4 G/DL (ref 12–16)
LYMPHOCYTES # BLD: 1.8 K/UL (ref 1–4.8)
LYMPHOCYTES NFR BLD: 22.1 %
MAGNESIUM SERPL-MCNC: 2.1 MG/DL (ref 1.7–2.4)
MCH RBC QN AUTO: 30.4 PG (ref 27–31.3)
MCHC RBC AUTO-ENTMCNC: 32.1 % (ref 33–37)
MCV RBC AUTO: 94.9 FL (ref 79.4–94.8)
MONOCYTES # BLD: 0.6 K/UL (ref 0.2–0.8)
MONOCYTES NFR BLD: 7.9 %
NEUTROPHILS # BLD: 5.4 K/UL (ref 1.4–6.5)
NEUTS SEG NFR BLD: 67.5 %
PLATELET # BLD AUTO: 374 K/UL (ref 130–400)
POTASSIUM SERPL-SCNC: 3.4 MEQ/L (ref 3.4–4.9)
RBC # BLD AUTO: 5.06 M/UL (ref 4.2–5.4)
SODIUM SERPL-SCNC: 142 MEQ/L (ref 135–144)
TROPONIN, HIGH SENSITIVITY: 8 NG/L (ref 0–19)
TROPONIN, HIGH SENSITIVITY: 8 NG/L (ref 0–19)
TROPONIN, HIGH SENSITIVITY: 9 NG/L (ref 0–19)
TROPONIN, HIGH SENSITIVITY: 9 NG/L (ref 0–19)
TSH SERPL-MCNC: 2.58 UIU/ML (ref 0.44–3.86)
WBC # BLD AUTO: 8 K/UL (ref 4.8–10.8)

## 2024-09-22 PROCEDURE — 93005 ELECTROCARDIOGRAM TRACING: CPT | Performed by: STUDENT IN AN ORGANIZED HEALTH CARE EDUCATION/TRAINING PROGRAM

## 2024-09-22 PROCEDURE — G0378 HOSPITAL OBSERVATION PER HR: HCPCS

## 2024-09-22 PROCEDURE — 85025 COMPLETE CBC W/AUTO DIFF WBC: CPT

## 2024-09-22 PROCEDURE — 6370000000 HC RX 637 (ALT 250 FOR IP): Performed by: STUDENT IN AN ORGANIZED HEALTH CARE EDUCATION/TRAINING PROGRAM

## 2024-09-22 PROCEDURE — 96372 THER/PROPH/DIAG INJ SC/IM: CPT

## 2024-09-22 PROCEDURE — 2580000003 HC RX 258: Performed by: STUDENT IN AN ORGANIZED HEALTH CARE EDUCATION/TRAINING PROGRAM

## 2024-09-22 PROCEDURE — 71046 X-RAY EXAM CHEST 2 VIEWS: CPT

## 2024-09-22 PROCEDURE — 99223 1ST HOSP IP/OBS HIGH 75: CPT | Performed by: INTERNAL MEDICINE

## 2024-09-22 PROCEDURE — 6370000000 HC RX 637 (ALT 250 FOR IP): Performed by: INTERNAL MEDICINE

## 2024-09-22 PROCEDURE — 84484 ASSAY OF TROPONIN QUANT: CPT

## 2024-09-22 PROCEDURE — 6360000002 HC RX W HCPCS: Performed by: STUDENT IN AN ORGANIZED HEALTH CARE EDUCATION/TRAINING PROGRAM

## 2024-09-22 PROCEDURE — 83735 ASSAY OF MAGNESIUM: CPT

## 2024-09-22 PROCEDURE — 84443 ASSAY THYROID STIM HORMONE: CPT

## 2024-09-22 PROCEDURE — 99285 EMERGENCY DEPT VISIT HI MDM: CPT

## 2024-09-22 PROCEDURE — 36415 COLL VENOUS BLD VENIPUNCTURE: CPT

## 2024-09-22 PROCEDURE — 80048 BASIC METABOLIC PNL TOTAL CA: CPT

## 2024-09-22 PROCEDURE — 1210000000 HC MED SURG R&B

## 2024-09-22 PROCEDURE — 6360000002 HC RX W HCPCS: Performed by: INTERNAL MEDICINE

## 2024-09-22 PROCEDURE — 83880 ASSAY OF NATRIURETIC PEPTIDE: CPT

## 2024-09-22 RX ORDER — ASPIRIN 81 MG/1
81 TABLET, CHEWABLE ORAL DAILY
Status: DISCONTINUED | OUTPATIENT
Start: 2024-09-22 | End: 2024-09-22 | Stop reason: DRUGHIGH

## 2024-09-22 RX ORDER — POLYETHYLENE GLYCOL 3350 17 G/17G
17 POWDER, FOR SOLUTION ORAL DAILY PRN
Status: DISCONTINUED | OUTPATIENT
Start: 2024-09-22 | End: 2024-09-24 | Stop reason: HOSPADM

## 2024-09-22 RX ORDER — ONDANSETRON 4 MG/1
4 TABLET, ORALLY DISINTEGRATING ORAL EVERY 8 HOURS PRN
Status: DISCONTINUED | OUTPATIENT
Start: 2024-09-22 | End: 2024-09-24 | Stop reason: HOSPADM

## 2024-09-22 RX ORDER — ENOXAPARIN SODIUM 100 MG/ML
1 INJECTION SUBCUTANEOUS 2 TIMES DAILY
Status: DISCONTINUED | OUTPATIENT
Start: 2024-09-22 | End: 2024-09-24 | Stop reason: HOSPADM

## 2024-09-22 RX ORDER — SODIUM CHLORIDE 9 MG/ML
INJECTION, SOLUTION INTRAVENOUS PRN
Status: DISCONTINUED | OUTPATIENT
Start: 2024-09-22 | End: 2024-09-24 | Stop reason: HOSPADM

## 2024-09-22 RX ORDER — ACETAMINOPHEN 325 MG/1
650 TABLET ORAL EVERY 6 HOURS PRN
Status: DISCONTINUED | OUTPATIENT
Start: 2024-09-22 | End: 2024-09-24 | Stop reason: HOSPADM

## 2024-09-22 RX ORDER — SODIUM CHLORIDE 0.9 % (FLUSH) 0.9 %
5-40 SYRINGE (ML) INJECTION PRN
Status: DISCONTINUED | OUTPATIENT
Start: 2024-09-22 | End: 2024-09-24 | Stop reason: HOSPADM

## 2024-09-22 RX ORDER — ENOXAPARIN SODIUM 100 MG/ML
40 INJECTION SUBCUTANEOUS DAILY
Status: DISCONTINUED | OUTPATIENT
Start: 2024-09-22 | End: 2024-09-22 | Stop reason: ALTCHOICE

## 2024-09-22 RX ORDER — SODIUM CHLORIDE 0.9 % (FLUSH) 0.9 %
5-40 SYRINGE (ML) INJECTION EVERY 12 HOURS SCHEDULED
Status: DISCONTINUED | OUTPATIENT
Start: 2024-09-22 | End: 2024-09-24 | Stop reason: HOSPADM

## 2024-09-22 RX ORDER — METOPROLOL SUCCINATE 25 MG/1
25 TABLET, EXTENDED RELEASE ORAL 2 TIMES DAILY
Status: DISCONTINUED | OUTPATIENT
Start: 2024-09-22 | End: 2024-09-24

## 2024-09-22 RX ORDER — MAGNESIUM SULFATE IN WATER 40 MG/ML
2000 INJECTION, SOLUTION INTRAVENOUS PRN
Status: DISCONTINUED | OUTPATIENT
Start: 2024-09-22 | End: 2024-09-24 | Stop reason: HOSPADM

## 2024-09-22 RX ORDER — ASPIRIN 81 MG/1
81 TABLET, CHEWABLE ORAL DAILY
Status: DISCONTINUED | OUTPATIENT
Start: 2024-09-22 | End: 2024-09-22 | Stop reason: ALTCHOICE

## 2024-09-22 RX ORDER — ONDANSETRON 2 MG/ML
4 INJECTION INTRAMUSCULAR; INTRAVENOUS EVERY 6 HOURS PRN
Status: DISCONTINUED | OUTPATIENT
Start: 2024-09-22 | End: 2024-09-24 | Stop reason: HOSPADM

## 2024-09-22 RX ORDER — ACETAMINOPHEN 650 MG/1
650 SUPPOSITORY RECTAL EVERY 6 HOURS PRN
Status: DISCONTINUED | OUTPATIENT
Start: 2024-09-22 | End: 2024-09-24 | Stop reason: HOSPADM

## 2024-09-22 RX ORDER — ASPIRIN 81 MG/1
81 TABLET, CHEWABLE ORAL DAILY
Status: DISCONTINUED | OUTPATIENT
Start: 2024-09-23 | End: 2024-09-24 | Stop reason: HOSPADM

## 2024-09-22 RX ORDER — POTASSIUM CHLORIDE 7.45 MG/ML
10 INJECTION INTRAVENOUS PRN
Status: DISCONTINUED | OUTPATIENT
Start: 2024-09-22 | End: 2024-09-24 | Stop reason: HOSPADM

## 2024-09-22 RX ORDER — PANTOPRAZOLE SODIUM 40 MG/1
40 TABLET, DELAYED RELEASE ORAL
Status: DISCONTINUED | OUTPATIENT
Start: 2024-09-23 | End: 2024-09-24 | Stop reason: HOSPADM

## 2024-09-22 RX ORDER — POTASSIUM CHLORIDE 1500 MG/1
40 TABLET, EXTENDED RELEASE ORAL PRN
Status: DISCONTINUED | OUTPATIENT
Start: 2024-09-22 | End: 2024-09-24 | Stop reason: HOSPADM

## 2024-09-22 RX ADMIN — ACETAMINOPHEN 325MG 650 MG: 325 TABLET ORAL at 18:15

## 2024-09-22 RX ADMIN — SODIUM CHLORIDE, PRESERVATIVE FREE 10 ML: 5 INJECTION INTRAVENOUS at 08:19

## 2024-09-22 RX ADMIN — METOPROLOL SUCCINATE 25 MG: 25 TABLET, FILM COATED, EXTENDED RELEASE ORAL at 19:55

## 2024-09-22 RX ADMIN — ENOXAPARIN SODIUM 40 MG: 100 INJECTION SUBCUTANEOUS at 08:18

## 2024-09-22 RX ADMIN — SODIUM CHLORIDE, PRESERVATIVE FREE 10 ML: 5 INJECTION INTRAVENOUS at 08:18

## 2024-09-22 RX ADMIN — ENOXAPARIN SODIUM 70 MG: 100 INJECTION SUBCUTANEOUS at 19:55

## 2024-09-22 RX ADMIN — POTASSIUM CHLORIDE 40 MEQ: 1500 TABLET, EXTENDED RELEASE ORAL at 14:05

## 2024-09-22 RX ADMIN — METOPROLOL SUCCINATE 25 MG: 25 TABLET, FILM COATED, EXTENDED RELEASE ORAL at 11:44

## 2024-09-22 RX ADMIN — Medication 10 ML: at 19:55

## 2024-09-22 RX ADMIN — ASPIRIN 81 MG 81 MG: 81 TABLET ORAL at 08:18

## 2024-09-22 ASSESSMENT — PAIN DESCRIPTION - DESCRIPTORS: DESCRIPTORS: SHARP

## 2024-09-22 ASSESSMENT — PAIN - FUNCTIONAL ASSESSMENT
PAIN_FUNCTIONAL_ASSESSMENT: ACTIVITIES ARE NOT PREVENTED
PAIN_FUNCTIONAL_ASSESSMENT: NONE - DENIES PAIN

## 2024-09-22 ASSESSMENT — PAIN SCALES - GENERAL
PAINLEVEL_OUTOF10: 0
PAINLEVEL_OUTOF10: 5

## 2024-09-22 ASSESSMENT — PAIN DESCRIPTION - LOCATION: LOCATION: CHEST;BREAST

## 2024-09-22 ASSESSMENT — PAIN DESCRIPTION - ORIENTATION: ORIENTATION: LEFT

## 2024-09-22 NOTE — ACP (ADVANCE CARE PLANNING)
Advance Care Planning   Healthcare Decision Maker:    Primary Decision Maker: Joey Quiñones - Brother/Sister - [phone]    Click here to complete Healthcare Decision Makers including selection of the Healthcare Decision Maker Relationship (ie \"Primary\").  Today we documented Decision Maker(s) consistent with Legal Next of Kin hierarchy.

## 2024-09-22 NOTE — PROGRESS NOTES
1815 PM patient complain of severe headache #6 out of 10 on scale. This RN went to get Tylenol as per patient choice, however, by the time I reached patients room, the headache had went to mid sternal chest pain sharp 5/10 and went to left shoulder blade posterior then dissipated,  \"totally gone\"per patient.  Headache also resolved. Patient did experience \"hot\" feeling. No diaphoresis, but \"hot\". Skin warm and dry. VS obtained. EKG completed. /69, O2 sat %, heart rate MP SR PVC's and PAC's. EKG sent to [facility]; Dr. Cardoza called this RN with new orders. See for details. Patient updated on plan of care.   1830 PM Patient without further complaints of any chest pain or discomfort; no headache. Patient skin warm and dry. No CP. No SOB. MP SR rate 80's with occasional PVC and PACs'.    1845 PM Patient ambulated to BR; noted MP 's then decreased once back to bed. Patient feeling warm off and on. No CP or SOB. VS obtained.   1850 PM VS /90. O2 at RA 97%,MP SR 76. RR 16. Call light in reach. Patient to call with any warm feeling or any headache/chest pain.

## 2024-09-22 NOTE — H&P
History and Physical  Patient: Mei Schneider  Unit/Bed: W188/W188-01  YOB: 1955  MRN: [id number]  Acct: [id number]   Admitting Diagnosis: Palpitations [R00.2]  PVC (premature ventricular contraction) [I49.3]  Admit Date:  9/22/2024  Hospital Day: 0      Chief Complaint:  Palpitations      History of Present Illness: pt woke up last night with heart fluttering. Made her dizziness. No falls.  ECG SR 84 with PVCs. She does admit that for past 1 month she noted some Dyspnea but no CP.  All HS Troponin negative.     She has ? H/O MVP.  I saw her years ago and requested Echo but she did not follow through    Telemetry SR w Frequent PVCs.       EKG:  PMHx:  Past Medical History:   Diagnosis Date    Smith esophagus 2016, 2018    Dr Espinosa, surveillance q 3 years    Benign gastric polyp     DJD (degenerative joint disease) of knee     Fibromyalgia     GERD (gastroesophageal reflux disease) 2008    H/O bone density study 11/24/2012    osteopenia, was on Reclast    History of colonoscopy with polypectomy 2016, 2018    Dr FIGUEROA, tubulovillous adenoma, tubular adenoma, repeat 2023    History of vitamin D deficiency     Hyperlipidemia     Inconclusive mammogram 12/2018    repeat R breast in 6 months    Plantar fasciitis, bilateral     Reactive airway disease     PFT 2016       PSHx:  Past Surgical History:   Procedure Laterality Date    ABLATION OF DYSRHYTHMIC FOCUS  1993    attempted ablation     BREAST BIOPSY Right 1990    benign    CATARACT REMOVAL WITH IMPLANT Bilateral      at the [facility]    COLONOSCOPY  09/14/2015    w/polypectomy     COLONOSCOPY N/A 4/1/2024    COLORECTAL CANCER SCREENING, HIGH RISK with polypectomy performed by Hilton Dyson MD at [facility]    ENDOSCOPY, COLON, DIAGNOSTIC      HYSTERECTOMY, TOTAL ABDOMINAL (CERVIX REMOVED)      fibroids, endometriosis, age 29    LAPAROSCOPY      PARATHYROID GLAND SURGERY      R side    OR COLORECTAL SCRN; HI RISK IND N/A 10/01/2018     Stability: Low Risk  (9/22/2024)    Housing Stability Vital Sign     Unable to Pay for Housing in the Last Year: No     Number of Times Moved in the Last Year: 0     Homeless in the Last Year: No       Family Hx:  Family History   Problem Relation Age of Onset    Hypertension Mother     Sudden Death Mother         dec age 83    Alzheimer's Disease Father         dec age 89    Colon Cancer Neg Hx        Allergies   Allergen Reactions    Codeine Nausea And Vomiting    Diazepam      Other reaction(s): Mental Status Change    Tape [Adhesive Tape] Rash    Valium Anxiety       Current Facility-Administered Medications   Medication Dose Route Frequency Provider Last Rate Last Admin    sodium chloride flush 0.9 % injection 5-40 mL  5-40 mL IntraVENous 2 times per day Kapil Rojas MD        sodium chloride flush 0.9 % injection 5-40 mL  5-40 mL IntraVENous PRN Kapil Rojas MD   10 mL at 09/22/24 0819    0.9 % sodium chloride infusion   IntraVENous PRN Kapil Rojas MD        potassium chloride (KLOR-CON M) extended release tablet 40 mEq  40 mEq Oral PRN Kapil Rojas MD        Or    potassium bicarb-citric acid (EFFER-K) effervescent tablet 40 mEq  40 mEq Oral PRN Kapil Rojas MD        Or    potassium chloride 10 mEq/100 mL IVPB (Peripheral Line)  10 mEq IntraVENous PRN Kapil Rojas MD        magnesium sulfate 2000 mg in 50 mL IVPB premix  2,000 mg IntraVENous PRN Kapil Rojas MD        enoxaparin (LOVENOX) injection 40 mg  40 mg SubCUTAneous Daily Kapil Rojas MD   40 mg at 09/22/24 0818    ondansetron (ZOFRAN-ODT) disintegrating tablet 4 mg  4 mg Oral Q8H PRN Kapil Rojas MD        Or    ondansetron (ZOFRAN) injection 4 mg  4 mg IntraVENous Q6H PRN Kapil Rojas MD        acetaminophen (TYLENOL) tablet 650 mg  650 mg Oral Q6H PRN Kapil Rojas MD        Or    acetaminophen (TYLENOL) suppository 650 mg  650 mg Rectal Q6H PRN Kapil Rojas MD        polyethylene glycol (GLYCOLAX) packet 17 g  17 g Oral Daily PRN

## 2024-09-22 NOTE — ED TRIAGE NOTES
Pt to ER with c/o irregular heart rate, pt states she woke up sweating and feeling like her heart was racing and noticed it was irregular, pt also started to become lightheaded, no hx of afib, pulse in triage ranged from 44-91

## 2024-09-22 NOTE — ED PROVIDER NOTES
[facility] ED  EMERGENCY DEPARTMENT ENCOUNTER      Pt Name: Mei Schneider  MRN: [id number]  Birthdate 1955  Date of evaluation: 9/22/2024  Provider: Kapil Rojas MD  3:46 AM    CHIEF COMPLAINT       Chief Complaint   Patient presents with    Irregular Heart Beat         HISTORY OF PRESENT ILLNESS    Mei Schneider is a 69 y.o. female who presents to the emergency department irregular heart rhythm and palpitations    HPI  Patient is a 69-year-old female presenting to the ED due to concern for palpitations on and off for the past month.  Patient endorses that she has had palpitations on and off for the past month with associated lightheadedness.  She endorses some shortness of breath when the palpitations occur but no chest pain.  She denies ever passing out.  She endorsed that tonight, she woke up sweating and lightheaded and short of breath with palpitations but denied any chest pain or tightness.  She denies coughing up of any blood.  She denies any cocaine or methamphetamine abuse.  She denies ever being diagnosed with A-fib or atrial flutter.  Nursing staff noted in triage that patient had a heart rate in the 40s going up into the 90s at times.  Patient did feel lightheaded during the episode of her being in the 40s.    Patient, from chart review, appears to have seen cardiology in 2017.  At that time was mentioned that patient had PVCs.  Patient mention to me that she has had irregular heart rhythm since she was 19 years old and at one point she was post to get an ablation although they were not able to do it, per her, because she had a hole in her heart.  It was mentioned from cardiology visit at the time that she may have MVP given that she described then and is describing now, of having an extra flap of her MV.    Nursing Notes were reviewed.    REVIEW OF SYSTEMS       Review of Systems   Constitutional:  Positive for diaphoresis and fatigue. Negative for activity change, appetite change,

## 2024-09-22 NOTE — PROGRESS NOTES
Pt arrived via w/c frm ED. She is A/O/x4, TELE Indep. Takes her po medications whole w/ water. Last BM on 09/22/2024, no c/o constipation, abd pain, or diarrhea. Bilat Lungs are clr, no c/o SOB,or difficulties breathing. Bilat UE/LE pulses are palpable at +2, no edema noted. No skin issues noted. Call light is w/in reach.

## 2024-09-22 NOTE — CARE COORDINATION
Case Management Assessment  Initial Evaluation    Date/Time of Evaluation: 9/22/2024 8:04 AM  Assessment Completed by: KAYLYNN Torres    If patient is discharged prior to next notation, then this note serves as note for discharge by case management.    Patient Name: Mei Schneider                   YOB: 1955  Diagnosis: Palpitations [R00.2]  PVC (premature ventricular contraction) [I49.3]                   Date / Time: 9/22/2024  2:19 AM    Patient Admission Status: Inpatient   Readmission Risk (Low < 19, Mod (19-27), High > 27): Readmission Risk Score: 7.4    Current PCP: Dannielle Swartz, DO  PCP verified by CM? Yes    Chart Reviewed: Yes      History Provided by: Patient  Patient Orientation: Alert and Oriented    Patient Cognition: Alert    Hospitalization in the last 30 days (Readmission):  No    If yes, Readmission Assessment in CM Navigator will be completed.    Advance Directives:      Code Status: Full Code   Patient's Primary Decision Maker is: Legal Next of Kin      Discharge Planning:    Patient lives with: Alone Type of Home: House  Primary Care Giver: Self  Patient Support Systems include: Family Members   Current Financial resources:    Current community resources:    Current services prior to admission: None            Current DME:              Type of Home Care services:  None    ADLS  Prior functional level: Independent in ADLs/IADLs  Current functional level: Independent in ADLs/IADLs    PT AM-PAC:   /24  OT AM-PAC:   /24    Family can provide assistance at DC: Yes  Would you like Case Management to discuss the discharge plan with any other family members/significant others, and if so, who? No  Plans to Return to Present Housing: Yes  Other Identified Issues/Barriers to RETURNING to current housing: none  Potential Assistance needed at discharge: N/A            Potential DME:    Patient expects to discharge to: House  Plan for transportation at discharge:       Financial    Payor: [employer] / Plan: [employer]ANS / Product Type: *No Product type* /     Does insurance require precert for SNF: Yes    Potential assistance Purchasing Medications:    Meds-to-Beds request:        [facility] - [place], [place] - [address] - P [phone] - F [phone]  [address]  [address]  [place] [address]  Phone: [phone] Fax: [phone]    [facility] [employer] - [place], [place] - [address] - P [phone] - F [phone]  [address] [address]  Phone: [phone] Fax: [phone]      Notes:    Factors facilitating achievement of predicted outcomes: Family support, Motivated, Cooperative, and Pleasant    Barriers to discharge: Medical complications    Additional Case Management Notes: Patient reported she lives at home alone. She said she does not have any DME, O2, dialysis, or VA. Patient told LSW she is able to drive herself to appointments. No needs identified for discharge. Patient plans to return home.     The Plan for Transition of Care is related to the following treatment goals of Palpitations [R00.2]  PVC (premature ventricular contraction) [I49.3]    IF APPLICABLE: The Patient and/or patient representative Mei and her family were provided with a choice of provider and agrees with the discharge plan. Freedom of choice list with basic dialogue that supports the patient's individualized plan of care/goals and shares the quality data associated with the providers was provided to: Patient   Patient Representative Name:       The Patient and/or Patient Representative Agree with the Discharge Plan? Yes    KAYLYNN Torres  Case Management Department  Ph: [phone] Fax: [phone]

## 2024-09-23 ENCOUNTER — APPOINTMENT (OUTPATIENT)
Age: 69
DRG: 310 | End: 2024-09-23
Attending: INTERNAL MEDICINE
Payer: COMMERCIAL

## 2024-09-23 LAB
ECHO AO ROOT DIAM: 3 CM
ECHO AO ROOT INDEX: 1.62 CM/M2
ECHO AV AREA PEAK VELOCITY: 2.4 CM2
ECHO AV AREA VTI: 2.6 CM2
ECHO AV AREA/BSA PEAK VELOCITY: 1.3 CM2/M2
ECHO AV AREA/BSA VTI: 1.4 CM2/M2
ECHO AV CUSP MM: 1.7 CM
ECHO AV MEAN GRADIENT: 3 MMHG
ECHO AV MEAN VELOCITY: 0.7 M/S
ECHO AV PEAK GRADIENT: 5 MMHG
ECHO AV PEAK VELOCITY: 1.1 M/S
ECHO AV VELOCITY RATIO: 0.64
ECHO AV VTI: 26 CM
ECHO BSA: 1.87 M2
ECHO EST RA PRESSURE: 3 MMHG
ECHO LA DIAMETER INDEX: 2.43 CM/M2
ECHO LA DIAMETER: 4.5 CM
ECHO LA TO AORTIC ROOT RATIO: 1.5
ECHO LA VOL A-L A2C: 69 ML (ref 22–52)
ECHO LA VOL A-L A4C: 41 ML (ref 22–52)
ECHO LA VOL MOD A2C: 67 ML (ref 22–52)
ECHO LA VOL MOD A4C: 40 ML (ref 22–52)
ECHO LA VOLUME AREA LENGTH: 58 ML
ECHO LA VOLUME INDEX A-L A2C: 37 ML/M2 (ref 16–34)
ECHO LA VOLUME INDEX A-L A4C: 22 ML/M2 (ref 16–34)
ECHO LA VOLUME INDEX AREA LENGTH: 31 ML/M2 (ref 16–34)
ECHO LA VOLUME INDEX MOD A2C: 36 ML/M2 (ref 16–34)
ECHO LA VOLUME INDEX MOD A4C: 22 ML/M2 (ref 16–34)
ECHO LV E' LATERAL VELOCITY: 5.5 CM/S
ECHO LV E' SEPTAL VELOCITY: 4.9 CM/S
ECHO LV EDV A2C: 102 ML
ECHO LV EDV A4C: 110 ML
ECHO LV EDV BP: 108 ML (ref 56–104)
ECHO LV EDV INDEX A4C: 59 ML/M2
ECHO LV EDV INDEX BP: 58 ML/M2
ECHO LV EDV NDEX A2C: 55 ML/M2
ECHO LV EJECTION FRACTION A2C: 33 %
ECHO LV EJECTION FRACTION A4C: 38 %
ECHO LV EJECTION FRACTION BIPLANE: 37 % (ref 55–100)
ECHO LV ESV A2C: 68 ML
ECHO LV ESV A4C: 68 ML
ECHO LV ESV BP: 68 ML (ref 19–49)
ECHO LV ESV INDEX A2C: 37 ML/M2
ECHO LV ESV INDEX A4C: 37 ML/M2
ECHO LV ESV INDEX BP: 37 ML/M2
ECHO LV FRACTIONAL SHORTENING: 18 % (ref 28–44)
ECHO LV INTERNAL DIMENSION DIASTOLE INDEX: 3.24 CM/M2
ECHO LV INTERNAL DIMENSION DIASTOLIC: 6 CM (ref 3.9–5.3)
ECHO LV INTERNAL DIMENSION SYSTOLIC INDEX: 2.65 CM/M2
ECHO LV INTERNAL DIMENSION SYSTOLIC: 4.9 CM
ECHO LV IVSD: 1.1 CM (ref 0.6–0.9)
ECHO LV IVSS: 1.2 CM
ECHO LV MASS 2D: 263 G (ref 67–162)
ECHO LV MASS INDEX 2D: 142.2 G/M2 (ref 43–95)
ECHO LV POSTERIOR WALL DIASTOLIC: 1 CM (ref 0.6–0.9)
ECHO LV POSTERIOR WALL SYSTOLIC: 1.1 CM
ECHO LV RELATIVE WALL THICKNESS RATIO: 0.33
ECHO LVOT AREA: 3.8 CM2
ECHO LVOT AV VTI INDEX: 0.67
ECHO LVOT DIAM: 2.2 CM
ECHO LVOT MEAN GRADIENT: 1 MMHG
ECHO LVOT PEAK GRADIENT: 2 MMHG
ECHO LVOT PEAK VELOCITY: 0.7 M/S
ECHO LVOT STROKE VOLUME INDEX: 35.9 ML/M2
ECHO LVOT SV: 66.5 ML
ECHO LVOT VTI: 17.5 CM
ECHO MV A VELOCITY: 0.61 M/S
ECHO MV E VELOCITY: 0.36 M/S
ECHO MV E/A RATIO: 0.59
ECHO MV E/E' LATERAL: 6.55
ECHO MV E/E' RATIO (AVERAGED): 6.95
ECHO MV E/E' SEPTAL: 7.35
ECHO PV MAX VELOCITY: 1 M/S
ECHO PV PEAK GRADIENT: 4 MMHG
ECHO RIGHT VENTRICULAR SYSTOLIC PRESSURE (RVSP): 26 MMHG
ECHO RV INTERNAL DIMENSION: 3.2 CM
ECHO RV TAPSE: 2 CM (ref 1.7–?)
ECHO RVOT PEAK GRADIENT: 2 MMHG
ECHO RVOT PEAK VELOCITY: 0.8 M/S
ECHO TV REGURGITANT MAX VELOCITY: 2.41 M/S
ECHO TV REGURGITANT PEAK GRADIENT: 23 MMHG
ERYTHROCYTE [DISTWIDTH] IN BLOOD BY AUTOMATED COUNT: 13.6 % (ref 11.5–14.5)
HCT VFR BLD AUTO: 44.3 % (ref 37–47)
HGB BLD-MCNC: 14.3 G/DL (ref 12–16)
MCH RBC QN AUTO: 30.2 PG (ref 27–31.3)
MCHC RBC AUTO-ENTMCNC: 32.3 % (ref 33–37)
MCV RBC AUTO: 93.5 FL (ref 79.4–94.8)
PLATELET # BLD AUTO: 343 K/UL (ref 130–400)
RBC # BLD AUTO: 4.74 M/UL (ref 4.2–5.4)
TROPONIN, HIGH SENSITIVITY: 7 NG/L (ref 0–19)
TROPONIN, HIGH SENSITIVITY: 7 NG/L (ref 0–19)
TROPONIN, HIGH SENSITIVITY: 8 NG/L (ref 0–19)
TROPONIN, HIGH SENSITIVITY: 9 NG/L (ref 0–19)
WBC # BLD AUTO: 6.2 K/UL (ref 4.8–10.8)

## 2024-09-23 PROCEDURE — 96372 THER/PROPH/DIAG INJ SC/IM: CPT

## 2024-09-23 PROCEDURE — 93306 TTE W/DOPPLER COMPLETE: CPT | Performed by: INTERNAL MEDICINE

## 2024-09-23 PROCEDURE — G0378 HOSPITAL OBSERVATION PER HR: HCPCS

## 2024-09-23 PROCEDURE — 93306 TTE W/DOPPLER COMPLETE: CPT

## 2024-09-23 PROCEDURE — 6360000002 HC RX W HCPCS: Performed by: INTERNAL MEDICINE

## 2024-09-23 PROCEDURE — 99233 SBSQ HOSP IP/OBS HIGH 50: CPT | Performed by: INTERNAL MEDICINE

## 2024-09-23 PROCEDURE — 1210000000 HC MED SURG R&B

## 2024-09-23 PROCEDURE — 84484 ASSAY OF TROPONIN QUANT: CPT

## 2024-09-23 PROCEDURE — 36415 COLL VENOUS BLD VENIPUNCTURE: CPT

## 2024-09-23 PROCEDURE — 6370000000 HC RX 637 (ALT 250 FOR IP): Performed by: INTERNAL MEDICINE

## 2024-09-23 PROCEDURE — 85027 COMPLETE CBC AUTOMATED: CPT

## 2024-09-23 PROCEDURE — 2580000003 HC RX 258: Performed by: STUDENT IN AN ORGANIZED HEALTH CARE EDUCATION/TRAINING PROGRAM

## 2024-09-23 RX ADMIN — Medication 10 ML: at 21:47

## 2024-09-23 RX ADMIN — ENOXAPARIN SODIUM 70 MG: 100 INJECTION SUBCUTANEOUS at 08:34

## 2024-09-23 RX ADMIN — METOPROLOL SUCCINATE 25 MG: 25 TABLET, FILM COATED, EXTENDED RELEASE ORAL at 21:44

## 2024-09-23 RX ADMIN — PANTOPRAZOLE SODIUM 40 MG: 40 TABLET, DELAYED RELEASE ORAL at 06:08

## 2024-09-23 RX ADMIN — METOPROLOL SUCCINATE 25 MG: 25 TABLET, FILM COATED, EXTENDED RELEASE ORAL at 08:35

## 2024-09-23 RX ADMIN — ASPIRIN 81 MG 81 MG: 81 TABLET ORAL at 08:35

## 2024-09-23 RX ADMIN — ENOXAPARIN SODIUM 70 MG: 100 INJECTION SUBCUTANEOUS at 21:44

## 2024-09-23 ASSESSMENT — PAIN SCALES - GENERAL
PAINLEVEL_OUTOF10: 0

## 2024-09-23 ASSESSMENT — ENCOUNTER SYMPTOMS
CHEST TIGHTNESS: 0
NAUSEA: 0
WHEEZING: 0
BLOOD IN STOOL: 0
EYES NEGATIVE: 1
GASTROINTESTINAL NEGATIVE: 1
SHORTNESS OF BREATH: 1
COUGH: 0
STRIDOR: 0

## 2024-09-23 NOTE — ACP (ADVANCE CARE PLANNING)
Advance Care Planning     Advance Care Planning Inpatient Note  [facility] Department    Today's Date: 9/23/2024  Unit: MLOZ 1W TELEMETRY    Received request from patient.  Upon review of chart and communication with care team, patient's decision making abilities are not in question.. Patient was/were present in the room during visit.    Goals of ACP Conversation:  Discuss advance care planning documents    Health Care Decision Makers:       Primary Decision Maker: Joey Quiñones - Brother/Sister - [phone]  Summary:  Completed New Documents   visited patient to assist her in completing her advanced directives. Patient completed both the Living Will and the HPOA. Patient named her brother as her agent.  placed a copy of the completed advanced directives in the chart.    Advance Care Planning Documents (Patient Wishes):  Healthcare Power of /Advance Directive Appointment of Health Care Agent  Living Will/Advance Directive     Assessment:  Patient has experienced some family illnesses and felt she needed to change the people named in her advanced directives.    Interventions:  Assisted in the completion of documents according to patient's wishes at this time    Care Preferences Communicated:   No    Outcomes/Plan:  New advance directive completed.    Electronically signed by Chaplain William on 9/23/2024 at 5:04 PM

## 2024-09-23 NOTE — PROGRESS NOTES
PROGRESS NOTE      Patient: Mei Schneider  Unit/Bed: W188/W188-01  YOB: 1955  MRN: [id number]  Acct: [id number]   Admitting Diagnosis: Palpitations [R00.2]  PVC (premature ventricular contraction) [I49.3]  Admit Date:  9/22/2024  Hospital Day: 1      Chief Complaint:  Palpitations      History of Present Illness: pt woke up last night with heart fluttering. Made her dizziness. No falls.  ECG SR 84 with PVCs. She does admit that for past 1 month she noted some Dyspnea but no CP.  All HS Troponin negative.     She has ? H/O MVP.  I saw her years ago and requested Echo but she did not follow through    Telemetry SR w Frequent PVCs.     9/23/24 Tele SR 64 feels better. Less flutter. No sob. Had CP last PM.  Trops all negative.  She has chronic Mild Lateran ST changes on ECG      EKG:  PMHx:  Past Medical History:   Diagnosis Date    Smith esophagus 2016, 2018    Dr Espinosa, surveillance q 3 years    Benign gastric polyp     DJD (degenerative joint disease) of knee     Fibromyalgia     GERD (gastroesophageal reflux disease) 2008    H/O bone density study 11/24/2012    osteopenia, was on Reclast    History of colonoscopy with polypectomy 2016, 2018    Dr FIGUEROA, tubulovillous adenoma, tubular adenoma, repeat 2023    History of vitamin D deficiency     Hyperlipidemia     Inconclusive mammogram 12/2018    repeat R breast in 6 months    Plantar fasciitis, bilateral     Reactive airway disease     PFT 2016       PSHx:  Past Surgical History:   Procedure Laterality Date    ABLATION OF DYSRHYTHMIC FOCUS  1993    attempted ablation     BREAST BIOPSY Right 1990    benign    CATARACT REMOVAL WITH IMPLANT Bilateral     Dr at the [facility]    COLONOSCOPY  09/14/2015    w/polypectomy     COLONOSCOPY N/A 4/1/2024    COLORECTAL CANCER SCREENING, HIGH RISK with polypectomy performed by Hilton Dyson MD at [facility]    ENDOSCOPY, COLON, DIAGNOSTIC      HYSTERECTOMY, TOTAL ABDOMINAL (CERVIX REMOVED)       Kapil Rojas MD        polyethylene glycol (GLYCOLAX) packet 17 g  17 g Oral Daily PRN Kapil Rojas MD        metoprolol succinate (TOPROL XL) extended release tablet 25 mg  25 mg Oral BID Kalia Cardoza MD   25 mg at 09/23/24 0835    pantoprazole (PROTONIX) tablet 40 mg  40 mg Oral QAM AC Kalia Cardoza MD   40 mg at 09/23/24 0608    enoxaparin (LOVENOX) injection 70 mg  1 mg/kg SubCUTAneous BID Kalia Cardoza MD   70 mg at 09/23/24 0834    aspirin chewable tablet 81 mg  81 mg Oral Daily Kalia Cardoza MD   81 mg at 09/23/24 0835       Review of Systems:   Review of Systems   Constitutional: Negative.  Negative for diaphoresis and fatigue.   HENT: Negative.     Eyes: Negative.    Respiratory:  Positive for shortness of breath. Negative for cough, chest tightness, wheezing and stridor.    Cardiovascular:  Positive for palpitations. Negative for chest pain and leg swelling.   Gastrointestinal: Negative.  Negative for blood in stool and nausea.   Genitourinary: Negative.    Musculoskeletal: Negative.    Skin: Negative.    Neurological:  Positive for light-headedness. Negative for dizziness, syncope and weakness.   Hematological: Negative.    Psychiatric/Behavioral: Negative.            Physical Examination:    BP (!) 125/47   Pulse 60   Temp 98.1 °F (36.7 °C) (Oral)   Resp 18   Ht 1.702 m (5' 7\")   Wt 73.9 kg (163 lb)   SpO2 98%   BMI 25.53 kg/m²    Physical Exam   Constitutional: She appears healthy. No distress.   HENT:   Normal cephalic and Atraumatic   Eyes: Pupils are equal, round, and reactive to light.   Neck: Thyroid normal. No JVD present. No neck adenopathy. No thyromegaly present.   Cardiovascular: Normal rate, regular rhythm, normal heart sounds, intact distal pulses and normal pulses.   Pulmonary/Chest: Effort normal and breath sounds normal. She has no wheezes. She has no rales. She exhibits no tenderness.   Abdominal: Soft. Bowel sounds are normal. There is no abdominal tenderness.

## 2024-09-23 NOTE — CONSULTS
Spiritual Health History and Assessment/Progress Note  [facility]    Advance Care Planning,  ,  ,      Name: Mei Schneider MRN: [id number]    Age: 69 y.o.     Sex: female   Language: English   Sabianist: Amish   Palpitations     Date: 9/23/2024            Total Time Calculated: 60 min              Spiritual Assessment began in [facility] 1W TELEMETRY        Referral/Consult From: Patient   Encounter Overview/Reason: Advance Care Planning  Service Provided For: Patient    Radha, Belief, Meaning:   Patient has beliefs or practices that help with coping during difficult times  Family/Friends No family/friends present      Importance and Influence:  Patient has spiritual/personal beliefs that influence decisions regarding their health  Family/Friends No family/friends present    Community:  Patient feels well-supported. Support system includes: [facility] Community and Extended family  Family/Friends No family/friends present    Assessment and Plan of Care:     Patient Interventions include: Assisted in Advance Care Planning conversation  Family/Friends Interventions include: No family/friends present    Patient Plan of Care: Spiritual Care available upon further referral  Family/Friends Plan of Care: No family/friends present    Electronically signed by Chaplain William on 9/23/2024 at 5:03 PM

## 2024-09-23 NOTE — PROGRESS NOTES
Pt is in bed resting comfortably, watching tv. She is A/O/ x 4, Independent, TELE. She takes her po medications whole w/ water. Last BM on 09/22/2024, no c/o constipation, abd pain, or diarrhea. Bilat Lungs are clr, no c/o SOB, or difficulties breathing, POX on RA at 98%. Bilat UE/LE pulses are palpable at (+2). No c/o Chest, shoulder, HA pain at this time. Call light is w/in reach.

## 2024-09-24 ENCOUNTER — APPOINTMENT (OUTPATIENT)
Age: 69
DRG: 310 | End: 2024-09-24
Payer: COMMERCIAL

## 2024-09-24 ENCOUNTER — APPOINTMENT (OUTPATIENT)
Dept: NUCLEAR MEDICINE | Age: 69
DRG: 310 | End: 2024-09-24
Payer: COMMERCIAL

## 2024-09-24 VITALS
DIASTOLIC BLOOD PRESSURE: 59 MMHG | OXYGEN SATURATION: 98 % | WEIGHT: 163 LBS | TEMPERATURE: 97.7 F | HEIGHT: 67 IN | BODY MASS INDEX: 25.58 KG/M2 | SYSTOLIC BLOOD PRESSURE: 124 MMHG | HEART RATE: 78 BPM | RESPIRATION RATE: 18 BRPM

## 2024-09-24 LAB
ECHO BSA: 1.87 M2
EKG ATRIAL RATE: 79 BPM
EKG ATRIAL RATE: 84 BPM
EKG P AXIS: 45 DEGREES
EKG P AXIS: 50 DEGREES
EKG P-R INTERVAL: 176 MS
EKG P-R INTERVAL: 176 MS
EKG Q-T INTERVAL: 374 MS
EKG Q-T INTERVAL: 398 MS
EKG QRS DURATION: 114 MS
EKG QRS DURATION: 118 MS
EKG QTC CALCULATION (BAZETT): 441 MS
EKG QTC CALCULATION (BAZETT): 456 MS
EKG R AXIS: -15 DEGREES
EKG R AXIS: -27 DEGREES
EKG T AXIS: 139 DEGREES
EKG T AXIS: 245 DEGREES
EKG VENTRICULAR RATE: 79 BPM
EKG VENTRICULAR RATE: 84 BPM
NUC STRESS EJECTION FRACTION: 45 %
STRESS BASELINE DIAS BP: 82 MMHG
STRESS BASELINE HR: 75 BPM
STRESS BASELINE SYS BP: 142 MMHG
STRESS ESTIMATED WORKLOAD: 1 METS
STRESS PEAK DIAS BP: 82 MMHG
STRESS PEAK SYS BP: 142 MMHG
STRESS PERCENT HR ACHIEVED: 68 %
STRESS POST PEAK HR: 102 BPM
STRESS RATE PRESSURE PRODUCT: NORMAL BPM*MMHG
STRESS TARGET HR: 150 BPM
TID: 0.93
TROPONIN, HIGH SENSITIVITY: 8 NG/L (ref 0–19)

## 2024-09-24 PROCEDURE — 2580000003 HC RX 258: Performed by: STUDENT IN AN ORGANIZED HEALTH CARE EDUCATION/TRAINING PROGRAM

## 2024-09-24 PROCEDURE — 6370000000 HC RX 637 (ALT 250 FOR IP): Performed by: STUDENT IN AN ORGANIZED HEALTH CARE EDUCATION/TRAINING PROGRAM

## 2024-09-24 PROCEDURE — 36415 COLL VENOUS BLD VENIPUNCTURE: CPT

## 2024-09-24 PROCEDURE — G0378 HOSPITAL OBSERVATION PER HR: HCPCS

## 2024-09-24 PROCEDURE — 93018 CV STRESS TEST I&R ONLY: CPT | Performed by: INTERNAL MEDICINE

## 2024-09-24 PROCEDURE — 2580000003 HC RX 258: Performed by: INTERNAL MEDICINE

## 2024-09-24 PROCEDURE — A9502 TC99M TETROFOSMIN: HCPCS | Performed by: INTERNAL MEDICINE

## 2024-09-24 PROCEDURE — 84484 ASSAY OF TROPONIN QUANT: CPT

## 2024-09-24 PROCEDURE — 3430000000 HC RX DIAGNOSTIC RADIOPHARMACEUTICAL: Performed by: INTERNAL MEDICINE

## 2024-09-24 PROCEDURE — 99238 HOSP IP/OBS DSCHRG MGMT 30/<: CPT | Performed by: INTERNAL MEDICINE

## 2024-09-24 PROCEDURE — 78452 HT MUSCLE IMAGE SPECT MULT: CPT

## 2024-09-24 PROCEDURE — 93017 CV STRESS TEST TRACING ONLY: CPT

## 2024-09-24 PROCEDURE — 6360000002 HC RX W HCPCS: Performed by: INTERNAL MEDICINE

## 2024-09-24 PROCEDURE — 96372 THER/PROPH/DIAG INJ SC/IM: CPT

## 2024-09-24 PROCEDURE — 6370000000 HC RX 637 (ALT 250 FOR IP): Performed by: INTERNAL MEDICINE

## 2024-09-24 RX ORDER — METOPROLOL SUCCINATE 50 MG/1
50 TABLET, EXTENDED RELEASE ORAL 2 TIMES DAILY
Status: DISCONTINUED | OUTPATIENT
Start: 2024-09-24 | End: 2024-09-24 | Stop reason: HOSPADM

## 2024-09-24 RX ORDER — REGADENOSON 0.08 MG/ML
0.4 INJECTION, SOLUTION INTRAVENOUS
Status: COMPLETED | OUTPATIENT
Start: 2024-09-24 | End: 2024-09-24

## 2024-09-24 RX ORDER — SODIUM CHLORIDE 0.9 % (FLUSH) 0.9 %
10 SYRINGE (ML) INJECTION PRN
Status: COMPLETED | OUTPATIENT
Start: 2024-09-24 | End: 2024-09-24

## 2024-09-24 RX ORDER — METOPROLOL SUCCINATE 50 MG/1
50 TABLET, EXTENDED RELEASE ORAL 2 TIMES DAILY
Qty: 30 TABLET | Refills: 3 | Status: SHIPPED | OUTPATIENT
Start: 2024-09-24

## 2024-09-24 RX ORDER — ASPIRIN 81 MG/1
81 TABLET, CHEWABLE ORAL DAILY
Qty: 30 TABLET | Refills: 3 | Status: SHIPPED | OUTPATIENT
Start: 2024-09-25

## 2024-09-24 RX ADMIN — Medication 10 ML: at 08:00

## 2024-09-24 RX ADMIN — ASPIRIN 81 MG 81 MG: 81 TABLET ORAL at 11:38

## 2024-09-24 RX ADMIN — TETROFOSMIN 35.8 MILLICURIE: 1.38 INJECTION, POWDER, LYOPHILIZED, FOR SOLUTION INTRAVENOUS at 09:23

## 2024-09-24 RX ADMIN — Medication 10 ML: at 11:40

## 2024-09-24 RX ADMIN — Medication 10 ML: at 09:23

## 2024-09-24 RX ADMIN — ENOXAPARIN SODIUM 70 MG: 100 INJECTION SUBCUTANEOUS at 11:39

## 2024-09-24 RX ADMIN — ACETAMINOPHEN 325MG 650 MG: 325 TABLET ORAL at 11:58

## 2024-09-24 RX ADMIN — Medication 10 ML: at 09:24

## 2024-09-24 RX ADMIN — REGADENOSON 0.4 MG: 0.08 INJECTION, SOLUTION INTRAVENOUS at 09:23

## 2024-09-24 RX ADMIN — TETROFOSMIN 11.9 MILLICURIE: 1.38 INJECTION, POWDER, LYOPHILIZED, FOR SOLUTION INTRAVENOUS at 08:00

## 2024-09-24 RX ADMIN — METOPROLOL SUCCINATE 50 MG: 50 TABLET, EXTENDED RELEASE ORAL at 11:45

## 2024-09-24 ASSESSMENT — PAIN DESCRIPTION - LOCATION: LOCATION: HEAD

## 2024-09-24 ASSESSMENT — PAIN SCALES - GENERAL: PAINLEVEL_OUTOF10: 6

## 2024-09-24 ASSESSMENT — PAIN DESCRIPTION - DESCRIPTORS: DESCRIPTORS: ACHING

## 2024-09-24 ASSESSMENT — PAIN DESCRIPTION - ORIENTATION: ORIENTATION: UPPER

## 2024-09-24 NOTE — PROGRESS NOTES
CLINICAL PHARMACY NOTE: MEDS TO BEDS    Total # of Prescriptions Filled: 2   The following medications were delivered to the patient:  Aspirin 81 mg chew    Metoprolol Succinate ER 50 mg tab    Additional Documentation:

## 2024-09-24 NOTE — DISCHARGE SUMMARY
Troponin, High Sensitivity 8 0 - 19 ng/L   Echo (TTE) complete (PRN contrast/bubble/strain/3D)    Collection Time: 09/23/24 10:04 AM   Result Value Ref Range    Body Surface Area 1.87 m2    IVSd 1.1 (A) 0.6 - 0.9 cm    IVSs 1.2 cm    LVIDd 6.0 (A) 3.9 - 5.3 cm    LVIDs 4.9 cm    LVOT Diameter 2.2 cm    LVPWd 1.0 (A) 0.6 - 0.9 cm    LVPWs 1.1 cm    EF BP 37 (A) 55 - 100 %    LV Ejection Fraction A2C 33 %    LV Ejection Fraction A4C 38 %    LV EDV A2C 102 mL    LV EDV A4C 110 mL    LV EDV  (A) 56 - 104 mL    LV ESV A2C 68 mL    LV ESV A4C 68 mL    LV ESV BP 68 (A) 19 - 49 mL    LVOT Peak Gradient 2 mmHg    LVOT Mean Gradient 1 mmHg    LVOT SV 66.5 ml    LVOT Peak Velocity 0.7 m/s    LVOT VTI 17.5 cm    RVIDd 3.2 cm    RVOT Peak Gradient 2 mmHg    RVOT Peak Velocity 0.8 m/s    LA Diameter 4.5 cm    LA Volume A/L 58 mL    LA Volume A-L A4C 69 (A) 22 - 52 mL    LA Volume A-L A4C 41 22 - 52 mL    LA Volume MOD A2C 67 (A) 22 - 52 mL    LA Volume MOD A4C 40 22 - 52 mL    AV Cusp Mmode 1.7 cm    AV Area by Peak Velocity 2.4 cm2    AV Area by VTI 2.6 cm2    AV Peak Gradient 5 mmHg    AV Mean Gradient 3 mmHg    AV Peak Velocity 1.1 m/s    AV Mean Velocity 0.7 m/s    AV VTI 26.0 cm    MV A Velocity 0.61 m/s    MV E Velocity 0.36 m/s    LV E' Lateral Velocity 5.5 cm/s    LV E' Septal Velocity 4.9 cm/s    PV Peak Gradient 4 mmHg    PV Max Velocity 1.0 m/s    TAPSE 2.0 1.7 cm    TR Peak Gradient 23 mmHg    TR Max Velocity 2.41 m/s    Aortic Root 3.0 cm    Fractional Shortening 2D 18 28 - 44 %    LV ESV Index BP 37 mL/m2    LV EDV Index BP 58 mL/m2    LV ESV Index A4C 37 mL/m2    LV EDV Index A4C 59 mL/m2    LV ESV Index A2C 37 mL/m2    LV EDV Index A2C 55 mL/m2    LVIDd Index 3.24 cm/m2    LVIDs Index 2.65 cm/m2    LV RWT Ratio 0.33     LV Mass 2D 263.0 (A) 67 - 162 g    LV Mass 2D Index 142.2 (A) 43 - 95 g/m2    MV E/A 0.59     E/E' Ratio (Averaged) 6.95     E/E' Lateral 6.55     E/E' Septal 7.35     LA Volume Index A/L 31  16 - 34 mL/m2    LVOT Stroke Volume Index 35.9 mL/m2    LVOT Area 3.8 cm2    LA Volume Index A-L A2C 37 (A) 16 - 34 mL/m2    LA Volume Index A-L A4C 22 16 - 34 mL/m2    LA Volume Index MOD A2C 36 (A) 16 - 34 ml/m2    LA Volume Index MOD A4C 22 16 - 34 ml/m2    LA Size Index 2.43 cm/m2    LA/AO Root Ratio 1.50     Ao Root Index 1.62 cm/m2    AV Velocity Ratio 0.64     LVOT:AV VTI Index 0.67     AXEL/BSA VTI 1.4 cm2/m2    AXEL/BSA Peak Velocity 1.3 cm2/m2    Est. RA Pressure 3 mmHg    RVSP 26 mmHg   Troponin    Collection Time: 09/23/24 12:26 PM   Result Value Ref Range    Troponin, High Sensitivity 7 0 - 19 ng/L   Troponin    Collection Time: 09/23/24  6:33 PM   Result Value Ref Range    Troponin, High Sensitivity 9 0 - 19 ng/L   Troponin    Collection Time: 09/24/24 12:49 AM   Result Value Ref Range    Troponin, High Sensitivity 8 0 - 19 ng/L   Nuclear stress test with myocardial perfusion    Collection Time: 09/24/24 11:11 AM   Result Value Ref Range    Baseline Systolic  mmHg    Baseline Diastolic BP 82 mmHg    Stress Systolic  mmHg    Stress Diastolic BP 82 mmHg    Baseline HR 75 BPM    Stress Peak  BPM    Stress Estimated Workload 1.0 METS    Body Surface Area 1.87 m2    Stress Rate Pressure Product 14,484 BPM*mmHg    Stress Target  bpm    Stress Percent HR Achieved 68 %    TID 0.93     Nuc Stress EF 45 %           Follow-up visits:   No follow-up provider specified.     Assessment:Plan  Active Hospital Problems    Diagnosis Date Noted    Palpitations [R00.2] 09/22/2024     Priority: Low      Symptomatic PVCs.  Lytes are WNL  advance BB to 50 bid.   Stress no ischemia noted.  Echo - LVEF 40-45%   HTN Stable                Electronically signed by SRIKANTH ELIZONDO MD on 9/24/2024 at 11:52 AM

## 2024-09-25 ENCOUNTER — TELEPHONE (OUTPATIENT)
Dept: FAMILY MEDICINE CLINIC | Age: 69
End: 2024-09-25

## 2024-09-27 ENCOUNTER — OFFICE VISIT (OUTPATIENT)
Dept: FAMILY MEDICINE CLINIC | Age: 69
End: 2024-09-27

## 2024-09-27 VITALS
DIASTOLIC BLOOD PRESSURE: 84 MMHG | SYSTOLIC BLOOD PRESSURE: 130 MMHG | HEIGHT: 67 IN | TEMPERATURE: 97.3 F | HEART RATE: 51 BPM | OXYGEN SATURATION: 98 % | WEIGHT: 162 LBS | BODY MASS INDEX: 25.43 KG/M2

## 2024-09-27 DIAGNOSIS — I49.3 FREQUENT PVCS: ICD-10-CM

## 2024-09-27 DIAGNOSIS — Z09 HOSPITAL DISCHARGE FOLLOW-UP: Primary | ICD-10-CM

## 2024-09-27 DIAGNOSIS — R00.2 PALPITATIONS: ICD-10-CM

## 2024-09-27 DIAGNOSIS — K22.719 BARRETT'S ESOPHAGUS WITH DYSPLASIA: ICD-10-CM

## 2024-09-27 DIAGNOSIS — I42.9 CARDIOMYOPATHY, UNSPECIFIED TYPE (HCC): ICD-10-CM

## 2024-09-27 RX ORDER — OMEPRAZOLE 40 MG/1
40 CAPSULE, DELAYED RELEASE ORAL
Qty: 90 CAPSULE | Refills: 1 | Status: SHIPPED | OUTPATIENT
Start: 2024-09-27

## 2024-09-27 SDOH — ECONOMIC STABILITY: FOOD INSECURITY: WITHIN THE PAST 12 MONTHS, YOU WORRIED THAT YOUR FOOD WOULD RUN OUT BEFORE YOU GOT MONEY TO BUY MORE.: NEVER TRUE

## 2024-09-27 SDOH — ECONOMIC STABILITY: FOOD INSECURITY: WITHIN THE PAST 12 MONTHS, THE FOOD YOU BOUGHT JUST DIDN'T LAST AND YOU DIDN'T HAVE MONEY TO GET MORE.: NEVER TRUE

## 2024-09-27 SDOH — ECONOMIC STABILITY: INCOME INSECURITY: HOW HARD IS IT FOR YOU TO PAY FOR THE VERY BASICS LIKE FOOD, HOUSING, MEDICAL CARE, AND HEATING?: NOT HARD AT ALL

## 2024-09-27 ASSESSMENT — ENCOUNTER SYMPTOMS
SORE THROAT: 0
VOMITING: 0
NAUSEA: 0
COUGH: 0
DIARRHEA: 0
SHORTNESS OF BREATH: 0
ABDOMINAL PAIN: 0
WHEEZING: 0
RHINORRHEA: 0
EYE DISCHARGE: 0

## 2024-09-27 ASSESSMENT — PATIENT HEALTH QUESTIONNAIRE - PHQ9
SUM OF ALL RESPONSES TO PHQ QUESTIONS 1-9: 0
SUM OF ALL RESPONSES TO PHQ9 QUESTIONS 1 & 2: 0
2. FEELING DOWN, DEPRESSED OR HOPELESS: NOT AT ALL
SUM OF ALL RESPONSES TO PHQ QUESTIONS 1-9: 0
1. LITTLE INTEREST OR PLEASURE IN DOING THINGS: NOT AT ALL
SUM OF ALL RESPONSES TO PHQ QUESTIONS 1-9: 0
SUM OF ALL RESPONSES TO PHQ QUESTIONS 1-9: 0

## 2024-10-21 ENCOUNTER — OFFICE VISIT (OUTPATIENT)
Dept: CARDIOLOGY CLINIC | Age: 69
End: 2024-10-21
Payer: COMMERCIAL

## 2024-10-21 VITALS
BODY MASS INDEX: 26.47 KG/M2 | HEART RATE: 63 BPM | SYSTOLIC BLOOD PRESSURE: 138 MMHG | WEIGHT: 169 LBS | OXYGEN SATURATION: 97 % | DIASTOLIC BLOOD PRESSURE: 82 MMHG

## 2024-10-21 DIAGNOSIS — I10 HYPERTENSION, UNSPECIFIED TYPE: Primary | ICD-10-CM

## 2024-10-21 DIAGNOSIS — R94.31 ABNORMAL ECG: ICD-10-CM

## 2024-10-21 DIAGNOSIS — R00.2 PALPITATIONS: ICD-10-CM

## 2024-10-21 DIAGNOSIS — I49.3 PVC (PREMATURE VENTRICULAR CONTRACTION): ICD-10-CM

## 2024-10-21 PROCEDURE — 99214 OFFICE O/P EST MOD 30 MIN: CPT | Performed by: INTERNAL MEDICINE

## 2024-10-21 PROCEDURE — 1123F ACP DISCUSS/DSCN MKR DOCD: CPT | Performed by: INTERNAL MEDICINE

## 2024-10-21 PROCEDURE — 3075F SYST BP GE 130 - 139MM HG: CPT | Performed by: INTERNAL MEDICINE

## 2024-10-21 PROCEDURE — 3079F DIAST BP 80-89 MM HG: CPT | Performed by: INTERNAL MEDICINE

## 2024-10-21 RX ORDER — METOPROLOL SUCCINATE 50 MG/1
75 TABLET, EXTENDED RELEASE ORAL 2 TIMES DAILY
Qty: 270 TABLET | Refills: 3 | Status: SHIPPED | OUTPATIENT
Start: 2024-10-21

## 2024-10-21 ASSESSMENT — ENCOUNTER SYMPTOMS
WHEEZING: 0
BLOOD IN STOOL: 0
CHEST TIGHTNESS: 0
GASTROINTESTINAL NEGATIVE: 1
EYES NEGATIVE: 1
SHORTNESS OF BREATH: 1
STRIDOR: 0
NAUSEA: 0
COUGH: 0

## 2024-10-21 NOTE — PROGRESS NOTES
NEW PATIENT        Patient: Mei Schneider  YOB: 1955  MRN: [id number]    Chief Complaint:  Chief Complaint   Patient presents with    Follow-Up from Hospital   9/24/24 SPECT Negative EF 45%  9/24 Echo EFG 40-45    Subjective/HPI  recent hosp for palpitations. Noted frequent PVCs. No c[p no falls nobleed       EKG:SR with PVC  (computer states 2nd degree AV B- not true)  Past Medical History:   Diagnosis Date    Smith esophagus 2016, 2018    Dr Espinosa, surveillance q 3 years    Benign gastric polyp     DJD (degenerative joint disease) of knee     Fibromyalgia     GERD (gastroesophageal reflux disease) 2008    H/O bone density study 11/24/2012    osteopenia, was on Reclast    History of colonoscopy with polypectomy 2016, 2018    Dr FIGUEROA, tubulovillous adenoma, tubular adenoma, repeat 2023    History of vitamin D deficiency     Hyperlipidemia     Inconclusive mammogram 12/2018    repeat R breast in 6 months    Plantar fasciitis, bilateral     Reactive airway disease     PFT 2016       Past Surgical History:   Procedure Laterality Date    ABLATION OF DYSRHYTHMIC FOCUS  1993    attempted ablation     BREAST BIOPSY Right 1990    benign    CATARACT REMOVAL WITH IMPLANT Bilateral     Dr at the [facility]    COLONOSCOPY  09/14/2015    w/polypectomy     COLONOSCOPY N/A 4/1/2024    COLORECTAL CANCER SCREENING, HIGH RISK with polypectomy performed by Hilton Dyson MD at [facility]    ENDOSCOPY, COLON, DIAGNOSTIC      HYSTERECTOMY, TOTAL ABDOMINAL (CERVIX REMOVED)      fibroids, endometriosis, age 29    LAPAROSCOPY      PARATHYROID GLAND SURGERY      R side    ND COLORECTAL SCRN; HI RISK IND N/A 10/01/2018    COLONOSCOPY performed by Joselito Espinosa MD at [facility]    ND ESOPHAGOGASTRODUODENOSCOPY TRANSORAL DIAGNOSTIC N/A 10/01/2018    EGD ESOPHAGOGASTRODUODENOSCOPY WITH DILATION performed by Joselito Espinosa MD at [facility]    TONSILLECTOMY AND ADENOIDECTOMY      UPPER

## 2024-11-18 ENCOUNTER — OFFICE VISIT (OUTPATIENT)
Dept: FAMILY MEDICINE CLINIC | Age: 69
End: 2024-11-18
Payer: COMMERCIAL

## 2024-11-18 VITALS
TEMPERATURE: 97.7 F | HEART RATE: 60 BPM | SYSTOLIC BLOOD PRESSURE: 120 MMHG | WEIGHT: 168 LBS | BODY MASS INDEX: 26.37 KG/M2 | HEIGHT: 67 IN | DIASTOLIC BLOOD PRESSURE: 70 MMHG | OXYGEN SATURATION: 97 %

## 2024-11-18 DIAGNOSIS — Z00.00 MEDICARE ANNUAL WELLNESS VISIT, SUBSEQUENT: Primary | ICD-10-CM

## 2024-11-18 DIAGNOSIS — Z23 NEED FOR INFLUENZA VACCINATION: ICD-10-CM

## 2024-11-18 PROCEDURE — 1160F RVW MEDS BY RX/DR IN RCRD: CPT | Performed by: STUDENT IN AN ORGANIZED HEALTH CARE EDUCATION/TRAINING PROGRAM

## 2024-11-18 PROCEDURE — G0008 ADMIN INFLUENZA VIRUS VAC: HCPCS | Performed by: STUDENT IN AN ORGANIZED HEALTH CARE EDUCATION/TRAINING PROGRAM

## 2024-11-18 PROCEDURE — 1123F ACP DISCUSS/DSCN MKR DOCD: CPT | Performed by: STUDENT IN AN ORGANIZED HEALTH CARE EDUCATION/TRAINING PROGRAM

## 2024-11-18 PROCEDURE — 1159F MED LIST DOCD IN RCRD: CPT | Performed by: STUDENT IN AN ORGANIZED HEALTH CARE EDUCATION/TRAINING PROGRAM

## 2024-11-18 PROCEDURE — 90653 IIV ADJUVANT VACCINE IM: CPT | Performed by: STUDENT IN AN ORGANIZED HEALTH CARE EDUCATION/TRAINING PROGRAM

## 2024-11-18 PROCEDURE — G0439 PPPS, SUBSEQ VISIT: HCPCS | Performed by: STUDENT IN AN ORGANIZED HEALTH CARE EDUCATION/TRAINING PROGRAM

## 2024-11-18 ASSESSMENT — PATIENT HEALTH QUESTIONNAIRE - PHQ9
SUM OF ALL RESPONSES TO PHQ QUESTIONS 1-9: 1
2. FEELING DOWN, DEPRESSED OR HOPELESS: SEVERAL DAYS
1. LITTLE INTEREST OR PLEASURE IN DOING THINGS: NOT AT ALL
SUM OF ALL RESPONSES TO PHQ9 QUESTIONS 1 & 2: 1

## 2024-11-18 ASSESSMENT — LIFESTYLE VARIABLES
HOW OFTEN DO YOU HAVE A DRINK CONTAINING ALCOHOL: NEVER
HOW MANY STANDARD DRINKS CONTAINING ALCOHOL DO YOU HAVE ON A TYPICAL DAY: PATIENT DOES NOT DRINK

## 2024-11-18 NOTE — PROGRESS NOTES
Vaccine Information Sheet, \"Influenza - Inactivated\"  given to Mei Schneider, or parent/legal guardian of  Mei Schneider and verbalized understanding.    Patient responses:    Have you ever had a reaction to a flu vaccine? No  Are you able to eat eggs without adverse effects?  Yes  Do you have any current illness?  No  Have you ever had Guillian Broadway Syndrome?  No    Flu vaccine given per order. Please see immunization tab.

## 2024-11-18 NOTE — PROGRESS NOTES
Medicare Annual Wellness Visit    Mei Schneider is here for Medicare AWV    Assessment & Plan   Medicare annual wellness visit, subsequent  Need for influenza vaccination  -     Influenza, FLUAD Trivalent, (age 65 y+), IM, Preservative Free, 0.5mL    Recommendations for Preventive Services Due: see orders and patient instructions/AVS.  Recommended screening schedule for the next 5-10 years is provided to the patient in written form: see Patient Instructions/AVS.     Return in about 6 months (around 5/18/2025) for follow up.     Subjective       Patient's complete Health Risk Assessment and screening values have been reviewed and are found in Flowsheets. The following problems were reviewed today and where indicated follow up appointments were made and/or referrals ordered.    Positive Risk Factor Screenings with Interventions:             General HRA Questions:  Select all that apply: (!) Stress  Interventions - Stress:  Patient declined any further interventions or treatment                            Objective   Vitals:    11/18/24 0834   BP: 120/70   Pulse: 60   Temp: 97.7 °F (36.5 °C)   SpO2: 97%   Weight: 76.2 kg (168 lb)   Height: 1.702 m (5' 7\")      Body mass index is 26.31 kg/m².                  Allergies   Allergen Reactions    Codeine Nausea And Vomiting    Diazepam      Other reaction(s): Mental Status Change    Tape [Adhesive Tape] Rash    Valium Anxiety     Prior to Visit Medications    Medication Sig Taking? Authorizing Provider   Multiple Vitamins-Minerals (DAILY MULTIVITAMIN PO) Take by mouth 65+ Yes ProviderLane MD   metoprolol succinate (TOPROL XL) 50 MG extended release tablet Take 1.5 tablets by mouth in the morning and at bedtime Yes Kalia Cardoza MD   omeprazole (PRILOSEC) 40 MG delayed release capsule Take 1 capsule by mouth every morning (before breakfast) Yes Dannielle Swartz DO   aspirin 81 MG chewable tablet Take 1 tablet by mouth daily Yes Kalia Cardoza MD   acetaminophen

## 2024-11-18 NOTE — PATIENT INSTRUCTIONS

## 2025-01-13 ENCOUNTER — OFFICE VISIT (OUTPATIENT)
Dept: CARDIOLOGY CLINIC | Age: 70
End: 2025-01-13
Payer: MEDICARE

## 2025-01-13 VITALS
HEART RATE: 61 BPM | OXYGEN SATURATION: 95 % | BODY MASS INDEX: 26.72 KG/M2 | DIASTOLIC BLOOD PRESSURE: 100 MMHG | WEIGHT: 170.6 LBS | SYSTOLIC BLOOD PRESSURE: 140 MMHG

## 2025-01-13 DIAGNOSIS — I10 HYPERTENSION, UNSPECIFIED TYPE: ICD-10-CM

## 2025-01-13 DIAGNOSIS — R00.2 PALPITATIONS: Primary | ICD-10-CM

## 2025-01-13 LAB
ANION GAP SERPL CALCULATED.3IONS-SCNC: 11 MEQ/L (ref 9–15)
BUN SERPL-MCNC: 19 MG/DL (ref 8–23)
CALCIUM SERPL-MCNC: 9.9 MG/DL (ref 8.5–9.9)
CHLORIDE SERPL-SCNC: 109 MEQ/L (ref 95–107)
CO2 SERPL-SCNC: 24 MEQ/L (ref 20–31)
CREAT SERPL-MCNC: 0.76 MG/DL (ref 0.5–0.9)
ERYTHROCYTE [DISTWIDTH] IN BLOOD BY AUTOMATED COUNT: 13.9 % (ref 11.5–14.5)
GLUCOSE SERPL-MCNC: 104 MG/DL (ref 70–99)
HCT VFR BLD AUTO: 49 % (ref 37–47)
HGB BLD-MCNC: 16 G/DL (ref 12–16)
MCH RBC QN AUTO: 30.3 PG (ref 27–31.3)
MCHC RBC AUTO-ENTMCNC: 32.7 % (ref 33–37)
MCV RBC AUTO: 92.8 FL (ref 79.4–94.8)
PLATELET # BLD AUTO: 392 K/UL (ref 130–400)
POTASSIUM SERPL-SCNC: 4.7 MEQ/L (ref 3.4–4.9)
RBC # BLD AUTO: 5.28 M/UL (ref 4.2–5.4)
SODIUM SERPL-SCNC: 144 MEQ/L (ref 135–144)
WBC # BLD AUTO: 10.8 K/UL (ref 4.8–10.8)

## 2025-01-13 PROCEDURE — 1159F MED LIST DOCD IN RCRD: CPT | Performed by: INTERNAL MEDICINE

## 2025-01-13 PROCEDURE — 93000 ELECTROCARDIOGRAM COMPLETE: CPT | Performed by: INTERNAL MEDICINE

## 2025-01-13 PROCEDURE — 99215 OFFICE O/P EST HI 40 MIN: CPT | Performed by: INTERNAL MEDICINE

## 2025-01-13 PROCEDURE — 1123F ACP DISCUSS/DSCN MKR DOCD: CPT | Performed by: INTERNAL MEDICINE

## 2025-01-13 PROCEDURE — 3077F SYST BP >= 140 MM HG: CPT | Performed by: INTERNAL MEDICINE

## 2025-01-13 PROCEDURE — 3080F DIAST BP >= 90 MM HG: CPT | Performed by: INTERNAL MEDICINE

## 2025-01-13 RX ORDER — SODIUM CHLORIDE 0.9 % (FLUSH) 0.9 %
5-40 SYRINGE (ML) INJECTION EVERY 12 HOURS SCHEDULED
Status: CANCELLED | OUTPATIENT
Start: 2025-01-13

## 2025-01-13 RX ORDER — SODIUM CHLORIDE 450 MG/100ML
75 INJECTION, SOLUTION INTRAVENOUS CONTINUOUS
Status: CANCELLED | OUTPATIENT
Start: 2025-01-13

## 2025-01-13 RX ORDER — DIPHENHYDRAMINE HYDROCHLORIDE 50 MG/ML
50 INJECTION INTRAMUSCULAR; INTRAVENOUS ONCE
Status: CANCELLED | OUTPATIENT
Start: 2025-01-13 | End: 2025-01-13

## 2025-01-13 RX ORDER — HYDROCORTISONE SODIUM SUCCINATE 100 MG/2ML
200 INJECTION INTRAMUSCULAR; INTRAVENOUS ONCE
Status: CANCELLED | OUTPATIENT
Start: 2025-01-13 | End: 2025-01-13

## 2025-01-13 RX ORDER — SODIUM CHLORIDE 0.9 % (FLUSH) 0.9 %
5-40 SYRINGE (ML) INJECTION PRN
Status: CANCELLED | OUTPATIENT
Start: 2025-01-13

## 2025-01-13 RX ORDER — ONDANSETRON 2 MG/ML
4 INJECTION INTRAMUSCULAR; INTRAVENOUS EVERY 6 HOURS PRN
Status: CANCELLED | OUTPATIENT
Start: 2025-01-13

## 2025-01-13 RX ORDER — ISOSORBIDE MONONITRATE 30 MG/1
30 TABLET, EXTENDED RELEASE ORAL DAILY
Qty: 30 TABLET | Refills: 3 | Status: ON HOLD | OUTPATIENT
Start: 2025-01-13 | End: 2025-01-15 | Stop reason: HOSPADM

## 2025-01-13 RX ORDER — NITROGLYCERIN 0.4 MG/1
0.4 TABLET SUBLINGUAL EVERY 5 MIN PRN
Status: CANCELLED | OUTPATIENT
Start: 2025-01-13

## 2025-01-13 RX ORDER — SODIUM CHLORIDE 9 MG/ML
INJECTION, SOLUTION INTRAVENOUS PRN
Status: CANCELLED | OUTPATIENT
Start: 2025-01-13

## 2025-01-13 ASSESSMENT — ENCOUNTER SYMPTOMS
NAUSEA: 0
GASTROINTESTINAL NEGATIVE: 1
EYES NEGATIVE: 1
STRIDOR: 0
WHEEZING: 0
SHORTNESS OF BREATH: 1
COUGH: 0
CHEST TIGHTNESS: 0
BLOOD IN STOOL: 0

## 2025-01-13 NOTE — PROGRESS NOTES
Negative for chest pain and leg swelling.   Gastrointestinal: Negative.  Negative for blood in stool and nausea.   Genitourinary: Negative.    Musculoskeletal: Negative.    Skin: Negative.    Neurological: Negative.  Negative for dizziness, syncope, weakness and light-headedness.   Hematological: Negative.    Psychiatric/Behavioral: Negative.           Physical Examination:    BP (!) 140/100 (Site: Right Upper Arm, Position: Sitting, Cuff Size: Medium Adult)   Pulse 61   Wt 77.4 kg (170 lb 9.6 oz)   SpO2 95%   BMI 26.72 kg/m²    Physical Exam   Constitutional: She appears healthy. No distress.   HENT:   Normal cephalic and Atraumatic   Eyes: Pupils are equal, round, and reactive to light.   Neck: Thyroid normal. No JVD present. No neck adenopathy. No thyromegaly present.   Cardiovascular: Normal rate, regular rhythm, normal heart sounds, intact distal pulses and normal pulses.   Pulmonary/Chest: Effort normal and breath sounds normal. She has no wheezes. She has no rales. She exhibits no tenderness.   Abdominal: Soft. Bowel sounds are normal. There is no abdominal tenderness.   Musculoskeletal:         General: No tenderness or edema. Normal range of motion.      Cervical back: Normal range of motion and neck supple.   Neurological: She is alert and oriented to person, place, and time.   Skin: Skin is warm. No cyanosis. Nails show no clubbing.       LABS:  CBC:   Lab Results   Component Value Date/Time    WBC 6.2 09/23/2024 05:32 AM    RBC 4.74 09/23/2024 05:32 AM    HGB 14.3 09/23/2024 05:32 AM    HCT 44.3 09/23/2024 05:32 AM    MCV 93.5 09/23/2024 05:32 AM    MCH 30.2 09/23/2024 05:32 AM    MCHC 32.3 09/23/2024 05:32 AM    RDW 13.6 09/23/2024 05:32 AM     09/23/2024 05:32 AM    MPV 8.5 12/21/2013 07:42 AM     Lipids:  Lab Results   Component Value Date    CHOL 211 (H) 05/06/2022    CHOL 223 (H) 06/24/2021    CHOL 216 (H) 08/25/2020     Lab Results   Component Value Date    TRIG 78 05/06/2022    TRIG 59

## 2025-01-13 NOTE — H&P (VIEW-ONLY)
OFFICE VISIT        Patient: Mei Schneider  YOB: 1955  MRN: [id number]    Chief Complaint:  Chief Complaint   Patient presents with    Chest Pain     Had episode Saturday   Shooting pain from upper abd. To shoulders lasted about 18 minutes. Then heart started raising   Was sitting when this occur   SOB today when walking      9/24/24 SPECT Negative EF 45%  9/24 Echo EFG 40-45    Subjective/HPI  recent hosp for palpitations. Noted frequent PVCs. No c[p no falls no bleed    1/13/25 called in to be seen earlier.  Last week while sitting on her couch reading when she developed B/L severe CP that went to her shoulders and back on both sides.  She now feels a bit SOB.  She is convinced that she had an MI last week. It was very scary for her.        EKG:SR 63 w IVCD    +++ FH  Lives alone  Past Medical History:   Diagnosis Date    Smith esophagus 2016, 2018    Dr Espinosa, surveillance q 3 years    Benign gastric polyp     DJD (degenerative joint disease) of knee     Fibromyalgia     GERD (gastroesophageal reflux disease) 2008    H/O bone density study 11/24/2012    osteopenia, was on Reclast    History of colonoscopy with polypectomy 2016, 2018    Dr FIGUEROA, tubulovillous adenoma, tubular adenoma, repeat 2023    History of vitamin D deficiency     Hyperlipidemia     Inconclusive mammogram 12/2018    repeat R breast in 6 months    Plantar fasciitis, bilateral     Reactive airway disease     PFT 2016       Past Surgical History:   Procedure Laterality Date    ABLATION OF DYSRHYTHMIC FOCUS  1993    attempted ablation     BREAST BIOPSY Right 1990    benign    CATARACT REMOVAL WITH IMPLANT Bilateral      at the [facility]    COLONOSCOPY  09/14/2015    w/polypectomy     COLONOSCOPY N/A 4/1/2024    COLORECTAL CANCER SCREENING, HIGH RISK with polypectomy performed by Hilton Dyson MD at [facility]    ENDOSCOPY, COLON, DIAGNOSTIC      HYSTERECTOMY, TOTAL ABDOMINAL (CERVIX REMOVED)      fibroids,  57 bid.  Call w more symptoms.     2. NICHOLAS (dyspnea on exertion)  progressivelyworse especially past 2 months. No cp    - now she is having worse NICHOLAS and now w severe CP - RBA Cath PCI discussed w pt.  Add Imdur. Continue BB.     If negative, will treat with BB for PVC.   Suspect she may have MVP(she described remote \"extra flap of MV) - no MVP.     Counseling:  Heart Healthy Lifestyle, Take Precautions to Prevent Falls and Walk Daily     Return for Schedule Cath.        Electronically signed by SIRKANTH ELIZONDO MD on 1/13/2025 at 1:53 PM

## 2025-01-14 ENCOUNTER — TELEPHONE (OUTPATIENT)
Dept: CARDIOLOGY CLINIC | Age: 70
End: 2025-01-14

## 2025-01-14 DIAGNOSIS — I42.9 CARDIOMYOPATHY, UNSPECIFIED TYPE (HCC): Primary | ICD-10-CM

## 2025-01-15 ENCOUNTER — HOSPITAL ENCOUNTER (OUTPATIENT)
Age: 70
Setting detail: OUTPATIENT SURGERY
Discharge: HOME | End: 2025-01-15
Attending: INTERNAL MEDICINE | Admitting: INTERNAL MEDICINE
Payer: MEDICARE

## 2025-01-15 VITALS
TEMPERATURE: 97 F | BODY MASS INDEX: 26.37 KG/M2 | DIASTOLIC BLOOD PRESSURE: 71 MMHG | SYSTOLIC BLOOD PRESSURE: 139 MMHG | WEIGHT: 168 LBS | HEART RATE: 65 BPM | HEIGHT: 67 IN | OXYGEN SATURATION: 100 % | RESPIRATION RATE: 18 BRPM

## 2025-01-15 DIAGNOSIS — I42.9 CARDIOMYOPATHY (HCC): ICD-10-CM

## 2025-01-15 PROBLEM — R07.9 CHEST PAIN: Status: ACTIVE | Noted: 2025-01-15

## 2025-01-15 LAB — ECHO BSA: 1.89 M2

## 2025-01-15 PROCEDURE — 93458 L HRT ARTERY/VENTRICLE ANGIO: CPT | Performed by: INTERNAL MEDICINE

## 2025-01-15 PROCEDURE — 93005 ELECTROCARDIOGRAM TRACING: CPT | Performed by: INTERNAL MEDICINE

## 2025-01-15 PROCEDURE — 2709999900 HC NON-CHARGEABLE SUPPLY: Performed by: INTERNAL MEDICINE

## 2025-01-15 PROCEDURE — 99152 MOD SED SAME PHYS/QHP 5/>YRS: CPT | Performed by: INTERNAL MEDICINE

## 2025-01-15 PROCEDURE — 6360000004 HC RX CONTRAST MEDICATION: Performed by: INTERNAL MEDICINE

## 2025-01-15 PROCEDURE — C1769 GUIDE WIRE: HCPCS | Performed by: INTERNAL MEDICINE

## 2025-01-15 PROCEDURE — 6370000000 HC RX 637 (ALT 250 FOR IP): Performed by: INTERNAL MEDICINE

## 2025-01-15 PROCEDURE — C1894 INTRO/SHEATH, NON-LASER: HCPCS | Performed by: INTERNAL MEDICINE

## 2025-01-15 PROCEDURE — 2580000003 HC RX 258: Performed by: INTERNAL MEDICINE

## 2025-01-15 PROCEDURE — 99153 MOD SED SAME PHYS/QHP EA: CPT | Performed by: INTERNAL MEDICINE

## 2025-01-15 PROCEDURE — 6360000002 HC RX W HCPCS: Performed by: INTERNAL MEDICINE

## 2025-01-15 PROCEDURE — 7100000011 HC PHASE II RECOVERY - ADDTL 15 MIN: Performed by: INTERNAL MEDICINE

## 2025-01-15 PROCEDURE — 7100000010 HC PHASE II RECOVERY - FIRST 15 MIN: Performed by: INTERNAL MEDICINE

## 2025-01-15 RX ORDER — SODIUM CHLORIDE 0.9 % (FLUSH) 0.9 %
5-40 SYRINGE (ML) INJECTION EVERY 12 HOURS SCHEDULED
Status: DISCONTINUED | OUTPATIENT
Start: 2025-01-15 | End: 2025-01-15 | Stop reason: HOSPADM

## 2025-01-15 RX ORDER — ONDANSETRON 2 MG/ML
4 INJECTION INTRAMUSCULAR; INTRAVENOUS EVERY 6 HOURS PRN
Status: DISCONTINUED | OUTPATIENT
Start: 2025-01-15 | End: 2025-01-15 | Stop reason: HOSPADM

## 2025-01-15 RX ORDER — SODIUM CHLORIDE 0.9 % (FLUSH) 0.9 %
5-40 SYRINGE (ML) INJECTION PRN
Status: DISCONTINUED | OUTPATIENT
Start: 2025-01-15 | End: 2025-01-15 | Stop reason: HOSPADM

## 2025-01-15 RX ORDER — SODIUM CHLORIDE 9 MG/ML
INJECTION, SOLUTION INTRAVENOUS PRN
Status: DISCONTINUED | OUTPATIENT
Start: 2025-01-15 | End: 2025-01-15 | Stop reason: HOSPADM

## 2025-01-15 RX ORDER — IOPAMIDOL 612 MG/ML
INJECTION, SOLUTION INTRAVASCULAR PRN
Status: DISCONTINUED | OUTPATIENT
Start: 2025-01-15 | End: 2025-01-15 | Stop reason: HOSPADM

## 2025-01-15 RX ORDER — DIPHENHYDRAMINE HYDROCHLORIDE 50 MG/ML
50 INJECTION INTRAMUSCULAR; INTRAVENOUS ONCE
Status: DISCONTINUED | OUTPATIENT
Start: 2025-01-15 | End: 2025-01-15 | Stop reason: HOSPADM

## 2025-01-15 RX ORDER — SODIUM CHLORIDE 9 MG/ML
INJECTION, SOLUTION INTRAVENOUS
Status: DISCONTINUED
Start: 2025-01-15 | End: 2025-01-15 | Stop reason: HOSPADM

## 2025-01-15 RX ORDER — ASPIRIN 81 MG/1
81 TABLET, CHEWABLE ORAL ONCE
Status: COMPLETED | OUTPATIENT
Start: 2025-01-15 | End: 2025-01-15

## 2025-01-15 RX ORDER — HEPARIN SODIUM 200 [USP'U]/100ML
INJECTION, SOLUTION INTRAVENOUS CONTINUOUS PRN
Status: COMPLETED | OUTPATIENT
Start: 2025-01-15 | End: 2025-01-15

## 2025-01-15 RX ORDER — ACETAMINOPHEN 325 MG/1
650 TABLET ORAL EVERY 4 HOURS PRN
Status: DISCONTINUED | OUTPATIENT
Start: 2025-01-15 | End: 2025-01-15 | Stop reason: HOSPADM

## 2025-01-15 RX ORDER — NITROGLYCERIN 0.4 MG/1
0.4 TABLET SUBLINGUAL EVERY 5 MIN PRN
Status: DISCONTINUED | OUTPATIENT
Start: 2025-01-15 | End: 2025-01-15 | Stop reason: HOSPADM

## 2025-01-15 RX ORDER — SODIUM CHLORIDE 450 MG/100ML
75 INJECTION, SOLUTION INTRAVENOUS CONTINUOUS
Status: DISCONTINUED | OUTPATIENT
Start: 2025-01-15 | End: 2025-01-15 | Stop reason: HOSPADM

## 2025-01-15 RX ORDER — ACETAMINOPHEN 500 MG
500 TABLET ORAL ONCE
Status: COMPLETED | OUTPATIENT
Start: 2025-01-15 | End: 2025-01-15

## 2025-01-15 RX ORDER — MIDAZOLAM HYDROCHLORIDE 1 MG/ML
INJECTION, SOLUTION INTRAMUSCULAR; INTRAVENOUS PRN
Status: DISCONTINUED | OUTPATIENT
Start: 2025-01-15 | End: 2025-01-15 | Stop reason: HOSPADM

## 2025-01-15 RX ORDER — SODIUM CHLORIDE 450 MG/100ML
INJECTION, SOLUTION INTRAVENOUS CONTINUOUS
Status: DISCONTINUED | OUTPATIENT
Start: 2025-01-15 | End: 2025-01-15 | Stop reason: HOSPADM

## 2025-01-15 RX ORDER — HYDROCORTISONE SODIUM SUCCINATE 100 MG/2ML
200 INJECTION INTRAMUSCULAR; INTRAVENOUS ONCE
Status: DISCONTINUED | OUTPATIENT
Start: 2025-01-15 | End: 2025-01-15 | Stop reason: HOSPADM

## 2025-01-15 RX ADMIN — ASPIRIN 81 MG: 81 TABLET, CHEWABLE ORAL at 07:48

## 2025-01-15 RX ADMIN — SODIUM CHLORIDE: 4.5 INJECTION, SOLUTION INTRAVENOUS at 09:50

## 2025-01-15 RX ADMIN — ACETAMINOPHEN 500 MG: 500 TABLET ORAL at 11:39

## 2025-01-15 NOTE — INTERVAL H&P NOTE
Update History & Physical    The patient's History and Physical of January 13, 2025 was reviewed with the patient and I examined the patient. There was no change. The surgical site was confirmed by the patient and me.     Plan: The risks, benefits, expected outcome, and alternative to the recommended procedure have been discussed with the patient. Patient understands and wants to proceed with the procedure.     Electronically signed by SRIKANTH ELIZONDO MD on 1/15/2025 at 8:45 AM

## 2025-01-15 NOTE — DISCHARGE INSTRUCTIONS
No driving, operating heavy machinery or alcohol use x 24 hours.      No bending, pushing, pulling or lifting anything over 5lbs x 48 hours.  Limit stair use for the next 48 hours.    Dressing is to remain on the right groin until tomorrow after showering.  If bleeding occurs lie flat and hold pressure x 20 minutes. If bleeding does not stop continue holding pressure and proceed to the nearest emergency room.  You may keep a bandage over the right groin for one to two days.    No baths, swimming, or hot tub use x 1 week.    Resume activity as tolerated after 48 hours.      Follow up with cardiologist as recommended.

## 2025-01-15 NOTE — BRIEF OP NOTE
Brief Postoperative Note      Patient: Mei Schneider  YOB: 1955  MRN: [id number]     Section of Cardiology  Adult Brief Cardiac Cath Procedure Note        Procedure(s):  LHC, b/l coronary angio via RFA.  RRA easily accessed at 2 different sites but the wire did not traverse.     Pre-operative Diagnosis:  CP    H&P Status: Completed and reviewed.     Post-operative Diagnosis:  Moderate sedation supervised by myself and trained RN entire case.    Pt developed Hematoma at RFA access site.     LVEF 40%. Normal CORS. EDP 12     Manual pressure will be held to reduce Hematoma.     Findings:  See full report    Complications:  none    Primary Proceduralist:   Kalia Cardoza MD

## 2025-01-15 NOTE — PROGRESS NOTES
Sedation Pre- Procedure Evaluation    Patient name: Mei Schneider  YOB: 1955  MRN: [id number]   Allergies:   Codeine, Diazepam, Tape [adhesive tape], and Valium        Type Of Sedation  [x]local anesthesia  [x]moderate (conscious sedation)  []deep/analgesia      RN Focused History    Yes        No  N/A  []   [x]  Previous problem with airway anesthesia, sedation, or family history of the same    []   [x]  History Of tobacco, alcohol, or substance abuse     []   [x]  Problems associated with stridor, snoring, or sleep apnea    []   [] [x] Pediatric patient, history of premature birth or ventilator support (Moderate Sedation Only)     [x]   [] [] Moderate Sedation: Clear liquids within 6 hours of sedation and NPO within 2 hours    []   [] [x] Deep Sedation:Clear liquids within 6 hours of sedation and NPO within 2 hours      NPO since: 1/16/25 2359    Vitals, Cardiac Rhythm, Pain:   Vitals  Temp: 97 °F (36.1 °C)  Temp Source: Oral  Pulse: 72  Heart Rate Source: Monitor  Respirations: 20  BP: 121/67  MAP (Calculated): 85  MAP (mmHg): 84  Cardiac Rhythm: Sinus rhythm  Pain Assessment  Pain Assessment: None - Denies Pain      EKG:  EKG Interpretation: normal sinus rhythm, unchanged from previous tracings.      Nisha Scale: Activity: Able to move four extremities voluntarily or on command  Respiration: Able to breathe and cough freely  Circulation: Blood pressure within 20% of preanesthesia level  Consciousness: Fully awake  Oxygen: SAO2 > 92% on room air   Nisha Score: 10         Prior to Admission medications    Medication Sig Start Date End Date Taking? Authorizing Provider   Multiple Vitamins-Minerals (DAILY MULTIVITAMIN PO) Take by mouth 65+   Yes ProviderLane MD   metoprolol succinate (TOPROL XL) 50 MG extended release tablet Take 1.5 tablets by mouth in the morning and at bedtime 10/21/24  Yes Kalia Cardoza MD   omeprazole (PRILOSEC) 40 MG delayed release capsule Take 1 capsule by mouth  every morning (before breakfast) 9/27/24  Yes Dannielle Swartz DO   aspirin 81 MG chewable tablet Take 1 tablet by mouth daily 9/25/24  Yes Kalia Cardoza MD   acetaminophen (TYLENOL) 500 MG tablet Take 2 tablets by mouth every 6 hours as needed for Pain or Fever 12/24/23  Yes Ervin Alicia MD   Lactobacillus (PROBIOTIC ACIDOPHILUS PO) Take by mouth   Yes Provider, MD Lane   Cholecalciferol (VITAMIN D3) 50 MCG (2000 UT) CAPS 1 po daily with meal 9/26/22  Yes Rere Dial MD   isosorbide mononitrate (IMDUR) 30 MG extended release tablet Take 1 tablet by mouth daily  Patient not taking: Reported on 1/15/2025 1/13/25   Kalia Cardoza MD   [facility]icap [facility] by Does not apply route For medical condition lasting longer than 5 years.  Start date- 11/15/2023  End date- 11/15/2028 11/15/23   Dannielle Swartz DO        Electronically signed by Simi Valdivia RN on 1/15/2025 at 8:01 AM

## 2025-01-15 NOTE — PROGRESS NOTES
Pt given d/c instructions. Denies any questions/concerns. Rt femoral site soft with no hematoma or bleeding. Pt up with steady gait and able to dress self without difficulty or c/o pain. IVs d/c. All pt belongings given to pt at time of d/c. Off floor via w/c to care of friend.

## 2025-01-15 NOTE — PROGRESS NOTES
Pt c/o pain between shoulder blades 3/10. States, \"feels like a toothache\". States, \"I don't know if it's because I'm laying flat on my back. I don't normally lay on my back\". Pt is NSB on bedside monitor. EKG done. Resp easy and even. Skin p/w/d. Will notify Dr Cardoza of pt c/o.

## 2025-01-15 NOTE — PRE SEDATION
Sedation Plan  ASA: class 2 - patient with mild systemic disease     Mallampati class: II - soft palate, uvula, fauces visible.    Sedation plan: moderate (conscious sedation)    Risks, benefits, and alternatives discussed with patient.  Use of blood products discussed with patient who consented to blood products.       Immediate reassessment prior to sedation:  Patient's status reviewed and vital signs assessed; acceptable to perform procedure and proceed to administer sedation as planned.

## 2025-01-15 NOTE — PROGRESS NOTES
Pt resting quietly. Rt femoral site remains soft with no swelling, drainage, or bruising. Denies any needs at this time. VSS.

## 2025-01-15 NOTE — PROGRESS NOTES
Pt returned to pre/post cath via cart. Report received from Jimmy IVY. Pt a&o x4. Denies any pain. Rt femoral access site stable with no hematoma, bruising, swelling, or bleeding. Placed on bedside monitor and NSR. Given fluids, refuses food at this time. Lights dimmed for comfort and warm blanket given.

## 2025-01-15 NOTE — PROGRESS NOTES
Pt resting with eyes closed. Rt femoral remains stable. Pt con't to c/o upper back pain 3/10. States, \"I think it's just from laying here. I'm just not used to this\". VSS. NSR. Resp easy and even. Skin p/w/d.

## 2025-01-16 LAB
EKG ATRIAL RATE: 57 BPM
EKG P AXIS: -3 DEGREES
EKG P-R INTERVAL: 206 MS
EKG Q-T INTERVAL: 448 MS
EKG QRS DURATION: 116 MS
EKG QTC CALCULATION (BAZETT): 436 MS
EKG R AXIS: -36 DEGREES
EKG T AXIS: 266 DEGREES
EKG VENTRICULAR RATE: 57 BPM

## 2025-01-17 ENCOUNTER — TELEPHONE (OUTPATIENT)
Dept: CARDIOLOGY CLINIC | Age: 70
End: 2025-01-17

## 2025-01-17 NOTE — TELEPHONE ENCOUNTER
Received call from patient. Per patient, she has a very small water blister from the dressing that she had on from having her cath on Wednesday 01/15/2025. Patient asking if she should pop the blister. Instructed patient no to pop the blister, but as she was talking on the phone, the blister was popped. Patient states that there is a small amount of clear drainage from the blister site. Patient instructed to put a loose gauze dressing over top and to keep the blister site clean and dry. Patient verbalized understanding.

## 2025-01-24 ENCOUNTER — TELEPHONE (OUTPATIENT)
Dept: CARDIOLOGY CLINIC | Age: 70
End: 2025-01-24

## 2025-01-24 NOTE — TELEPHONE ENCOUNTER
Patient came to office this morning. Patient is concerned about bruising to her right groin area from cath that had been done 01/15/2025. Upon inspection of the patient's bruising on her right groin, patient has a dark purple looking bruise starting at right lateral pelvic bone extending to her right inner thigh. Patient states that the area of bruising is still somewhat tender, but slightly improved over the last 9 days. She is still using ice intermittently to the site. Bruise appears to be healing, with yellowish edges near the top and middle outside borders. Bruising near the bottom near the inner thigh remains dark purple. Entire site of bruising palpated and is soft, no hematoma noted. Puncture site visible, no drainage noted.     Dr. Cardoza in to see patient and look at bruising to right groin. Per Dr. Cardoza, patient's bruising is a normal finding. Bruising is not extended upward, or around the back. Patient has no tenderness above the puncture site or in her lower back. Patient now understands that bruising is expected to possibly extend downward toward her knee over the next several weeks. Patient will continue to monitor bruising and use ice intermittently.

## 2025-01-24 NOTE — TELEPHONE ENCOUNTER
Pt came in and states that she had a cath done 2 weeks ago and she has increased bruising. Pt states it got maybe about a inch bigger. Kia IVY is looking at site to see if its normal.

## 2025-02-20 ENCOUNTER — OFFICE VISIT (OUTPATIENT)
Dept: CARDIOLOGY CLINIC | Age: 70
End: 2025-02-20

## 2025-02-20 VITALS
OXYGEN SATURATION: 96 % | SYSTOLIC BLOOD PRESSURE: 120 MMHG | BODY MASS INDEX: 27.76 KG/M2 | DIASTOLIC BLOOD PRESSURE: 86 MMHG | WEIGHT: 174.6 LBS | HEART RATE: 66 BPM

## 2025-02-20 DIAGNOSIS — I49.3 PVC (PREMATURE VENTRICULAR CONTRACTION): ICD-10-CM

## 2025-02-20 DIAGNOSIS — R94.31 ABNORMAL ECG: ICD-10-CM

## 2025-02-20 DIAGNOSIS — R00.2 PALPITATIONS: ICD-10-CM

## 2025-02-20 DIAGNOSIS — I10 HYPERTENSION, UNSPECIFIED TYPE: ICD-10-CM

## 2025-02-20 DIAGNOSIS — I42.9 CARDIOMYOPATHY, UNSPECIFIED TYPE (HCC): Primary | ICD-10-CM

## 2025-02-20 ASSESSMENT — ENCOUNTER SYMPTOMS
GASTROINTESTINAL NEGATIVE: 1
CHEST TIGHTNESS: 0
SHORTNESS OF BREATH: 1
EYES NEGATIVE: 1
BLOOD IN STOOL: 0
COUGH: 0
STRIDOR: 0
WHEEZING: 0
NAUSEA: 0

## 2025-02-20 NOTE — PROGRESS NOTES
OFFICE VISIT        Patient: Mei Schneider  YOB: 1955  MRN: [id number]    Chief Complaint:  Chief Complaint   Patient presents with    Follow-up     1 month  Denies any concerns     9/24/24 SPECT Negative EF 45%  9/24 Echo EFG 40-45  1/15/25 Cath   LVEF 40%. Normal CORS. EDP 12      Subjective/HPI  recent hosp for palpitations. Noted frequent PVCs. No c[p no falls no bleed    1/13/25 called in to be seen earlier.  Last week while sitting on her couch reading when she developed B/L severe CP that went to her shoulders and back on both sides.  She now feels a bit SOB.  She is convinced that she had an MI last week. It was very scary for her.     2/20/25 Right Groin Hematoma resolved. Minimal ecchymosis and discomfort at Pubis. No sob occ Cp.        EKG:SR 63 w IVCD    +++ FH  Lives alone  Past Medical History:   Diagnosis Date    Smith esophagus 2016, 2018    Dr Espinosa, surveillance q 3 years    Benign gastric polyp     DJD (degenerative joint disease) of knee     Fibromyalgia     GERD (gastroesophageal reflux disease) 2008    H/O bone density study 11/24/2012    osteopenia, was on Reclast    History of colonoscopy with polypectomy 2016, 2018    Dr FIGUEROA, tubulovillous adenoma, tubular adenoma, repeat 2023    History of vitamin D deficiency     Hyperlipidemia     Inconclusive mammogram 12/2018    repeat R breast in 6 months    Plantar fasciitis, bilateral     Reactive airway disease     PFT 2016       Past Surgical History:   Procedure Laterality Date    ABLATION OF DYSRHYTHMIC FOCUS  1993    attempted ablation     BREAST BIOPSY Right 1990    benign    CARDIAC PROCEDURE N/A 1/15/2025    Left heart cath / coronary angiography performed by Kalia Cardoza MD at [facility] CARDIAC CATH LAB    CATARACT REMOVAL WITH IMPLANT Bilateral      at the [facility]    COLONOSCOPY  09/14/2015    w/polypectomy     COLONOSCOPY N/A 4/1/2024    COLORECTAL CANCER SCREENING, HIGH RISK with polypectomy performed by Kiel

## 2025-04-10 RX ORDER — METOPROLOL SUCCINATE 50 MG/1
50 TABLET, EXTENDED RELEASE ORAL 2 TIMES DAILY
Qty: 180 TABLET | Refills: 3 | Status: SHIPPED | OUTPATIENT
Start: 2025-04-10

## 2025-04-10 NOTE — TELEPHONE ENCOUNTER
Requesting medication refill. Please approve or deny this request.    Rx requested:  Requested Prescriptions     Pending Prescriptions Disp Refills    sacubitril-valsartan (ENTRESTO) 24-26 MG per tablet 180 tablet 3     Sig: Take 1 tablet by mouth 2 times daily         Last Office Visit:   2/20/2025      Next Visit Date:  Future Appointments   Date Time Provider Department Center   5/6/2025 12:15 PM Kalia Cardoza MD [facility]   5/19/2025  8:30 AM Dannielle Swartz DO [facility]

## 2025-04-10 NOTE — TELEPHONE ENCOUNTER
Requesting medication refill. Please approve or deny this request.    Rx requested:  Requested Prescriptions     Pending Prescriptions Disp Refills    metoprolol succinate (TOPROL XL) 50 MG extended release tablet 270 tablet 3     Sig: Take 1.5 tablets by mouth in the morning and at bedtime         Last Office Visit:   2/20/2025      Next Visit Date:  Future Appointments   Date Time Provider Department Center   5/6/2025 12:15 PM Kalia Cardoza MD [facility]   5/19/2025  8:30 AM Dannielle Swartz DO [facility]

## 2025-04-16 NOTE — TELEPHONE ENCOUNTER
Requesting medication refill. Please approve or deny this request.    Rx requested:  Requested Prescriptions     Pending Prescriptions Disp Refills    metoprolol succinate (TOPROL XL) 50 MG extended release tablet 180 tablet 3     Sig: Take 1 tablet by mouth in the morning and at bedtime         Last Office Visit:   2/20/2025      Next Visit Date:  Future Appointments   Date Time Provider Department Center   5/6/2025 12:15 PM Kalia Cardoza MD [facility]   5/19/2025  8:30 AM Dannielle Swartz DO [facility]

## 2025-04-16 NOTE — TELEPHONE ENCOUNTER
[facility] and states they told her she's suppose to take     metoprolol 50mg   1 tablet in the morning and at bedtime     Pt told them it was wrong because she's suppose to be taking a total of 75mg and they told her we need to send a new prescription for    Metoprolol 50mg  Half tab in the morning and half at bedtime

## 2025-04-17 RX ORDER — METOPROLOL SUCCINATE 50 MG/1
50 TABLET, EXTENDED RELEASE ORAL 2 TIMES DAILY
Qty: 180 TABLET | Refills: 3 | Status: SHIPPED | OUTPATIENT
Start: 2025-04-17

## 2025-04-21 ENCOUNTER — TELEPHONE (OUTPATIENT)
Age: 70
End: 2025-04-21

## 2025-04-21 NOTE — TELEPHONE ENCOUNTER
PATIENT CALLED OFFICE AND STATES THAT SHE HAS BEEN TAKING HER METOPROLOL 75 MG BID AND HAS BEEN SINCE JANUARY . LOV STATES 50 MG BID HOWEVER PATIENT SHE DID NOT KNOW THIS AND WANTS CLAIRIFED SHOULD IT BE 50 MG BID OR 75 MG BID. SHE HAD RECENT SCRIPT CANCELED BECAUSE SHE STATES IT IS WRONG.    PLEASE ADVISE

## 2025-04-28 NOTE — TELEPHONE ENCOUNTER
Per Dr. Cardoza:  Toprol XL 50 bid     Attempted to call patient. No answer, voicemail left for patient to call back.

## 2025-05-06 ENCOUNTER — OFFICE VISIT (OUTPATIENT)
Age: 70
End: 2025-05-06
Payer: MEDICARE

## 2025-05-06 VITALS
HEART RATE: 62 BPM | WEIGHT: 176.6 LBS | SYSTOLIC BLOOD PRESSURE: 122 MMHG | BODY MASS INDEX: 28.08 KG/M2 | DIASTOLIC BLOOD PRESSURE: 88 MMHG | OXYGEN SATURATION: 98 %

## 2025-05-06 DIAGNOSIS — K22.719 BARRETT'S ESOPHAGUS WITH DYSPLASIA: ICD-10-CM

## 2025-05-06 PROCEDURE — 1159F MED LIST DOCD IN RCRD: CPT | Performed by: INTERNAL MEDICINE

## 2025-05-06 PROCEDURE — 1123F ACP DISCUSS/DSCN MKR DOCD: CPT | Performed by: INTERNAL MEDICINE

## 2025-05-06 PROCEDURE — 99214 OFFICE O/P EST MOD 30 MIN: CPT | Performed by: INTERNAL MEDICINE

## 2025-05-06 RX ORDER — OMEPRAZOLE 40 MG/1
40 CAPSULE, DELAYED RELEASE ORAL
Qty: 90 CAPSULE | Refills: 3 | Status: SHIPPED | OUTPATIENT
Start: 2025-05-06

## 2025-05-06 RX ORDER — METOPROLOL SUCCINATE 50 MG/1
75 TABLET, EXTENDED RELEASE ORAL 2 TIMES DAILY
Qty: 270 TABLET | Refills: 3 | Status: SHIPPED | OUTPATIENT
Start: 2025-05-06

## 2025-05-06 ASSESSMENT — ENCOUNTER SYMPTOMS
CHEST TIGHTNESS: 0
EYES NEGATIVE: 1
WHEEZING: 0
NAUSEA: 0
SHORTNESS OF BREATH: 1
BLOOD IN STOOL: 0
STRIDOR: 0
GASTROINTESTINAL NEGATIVE: 1
COUGH: 0

## 2025-05-06 NOTE — PROGRESS NOTES
OFFICE VISIT        Patient: Mei Schneider  YOB: 1955  MRN: [id number]    Chief Complaint:  Chief Complaint   Patient presents with    Follow-up     3 month  Discuss metoprolol dosage    9/24/24 SPECT Negative EF 45%  9/24 Echo EFG 40-45  1/15/25 Cath   LVEF 40%. Normal CORS. EDP 12      Subjective/HPI  recent hosp for palpitations. Noted frequent PVCs. No cp no falls no bleed    1/13/25 called in to be seen earlier.  Last week while sitting on her couch reading when she developed B/L severe CP that went to her shoulders and back on both sides.  She now feels a bit SOB.  She is convinced that she had an MI last week. It was very scary for her.     2/20/25 Right Groin Hematoma resolved. Minimal ecchymosis and discomfort at Pubis. No sob occ Cp.     5/6/25 since lowerign BB to 50 bid little more chest tightness.  Was much better at 75 bid. No flal no bleed no bleed.        EKG:SR 63 w IVCD    +++ FH  Lives alone    Past Medical History:   Diagnosis Date    Smith esophagus 2016, 2018    Dr Espinosa, surveillance q 3 years    Benign gastric polyp     DJD (degenerative joint disease) of knee     Fibromyalgia     GERD (gastroesophageal reflux disease) 2008    H/O bone density study 11/24/2012    osteopenia, was on Reclast    History of colonoscopy with polypectomy 2016, 2018    Dr FIGUEROA, tubulovillous adenoma, tubular adenoma, repeat 2023    History of vitamin D deficiency     Hyperlipidemia     Inconclusive mammogram 12/2018    repeat R breast in 6 months    Plantar fasciitis, bilateral     Reactive airway disease     PFT 2016       Past Surgical History:   Procedure Laterality Date    ABLATION OF DYSRHYTHMIC FOCUS  1993    attempted ablation     BREAST BIOPSY Right 1990    benign    CARDIAC PROCEDURE N/A 1/15/2025    Left heart cath / coronary angiography performed by Kalia Cardoza MD at [facility] CARDIAC CATH LAB    CATARACT REMOVAL WITH IMPLANT Bilateral     Dr at the [facility]    COLONOSCOPY  09/14/2015

## 2025-05-19 ENCOUNTER — OFFICE VISIT (OUTPATIENT)
Age: 70
End: 2025-05-19
Payer: MEDICARE

## 2025-05-19 VITALS
BODY MASS INDEX: 27.47 KG/M2 | WEIGHT: 175 LBS | HEART RATE: 52 BPM | HEIGHT: 67 IN | OXYGEN SATURATION: 97 % | TEMPERATURE: 97.8 F | SYSTOLIC BLOOD PRESSURE: 90 MMHG | DIASTOLIC BLOOD PRESSURE: 62 MMHG

## 2025-05-19 DIAGNOSIS — R00.2 PALPITATIONS: ICD-10-CM

## 2025-05-19 DIAGNOSIS — K22.719 BARRETT'S ESOPHAGUS WITH DYSPLASIA: ICD-10-CM

## 2025-05-19 DIAGNOSIS — Z00.00 MEDICARE ANNUAL WELLNESS VISIT, SUBSEQUENT: Primary | ICD-10-CM

## 2025-05-19 DIAGNOSIS — E55.9 HYPOVITAMINOSIS D: ICD-10-CM

## 2025-05-19 DIAGNOSIS — Z12.31 SCREENING MAMMOGRAM FOR BREAST CANCER: ICD-10-CM

## 2025-05-19 DIAGNOSIS — Z13.6 ENCOUNTER FOR LIPID SCREENING FOR CARDIOVASCULAR DISEASE: ICD-10-CM

## 2025-05-19 DIAGNOSIS — Z13.220 ENCOUNTER FOR LIPID SCREENING FOR CARDIOVASCULAR DISEASE: ICD-10-CM

## 2025-05-19 DIAGNOSIS — Z13.1 SCREENING FOR DIABETES MELLITUS: ICD-10-CM

## 2025-05-19 DIAGNOSIS — I49.3 FREQUENT PVCS: ICD-10-CM

## 2025-05-19 PROCEDURE — G2211 COMPLEX E/M VISIT ADD ON: HCPCS | Performed by: STUDENT IN AN ORGANIZED HEALTH CARE EDUCATION/TRAINING PROGRAM

## 2025-05-19 PROCEDURE — 99214 OFFICE O/P EST MOD 30 MIN: CPT | Performed by: STUDENT IN AN ORGANIZED HEALTH CARE EDUCATION/TRAINING PROGRAM

## 2025-05-19 PROCEDURE — 1159F MED LIST DOCD IN RCRD: CPT | Performed by: STUDENT IN AN ORGANIZED HEALTH CARE EDUCATION/TRAINING PROGRAM

## 2025-05-19 PROCEDURE — 1123F ACP DISCUSS/DSCN MKR DOCD: CPT | Performed by: STUDENT IN AN ORGANIZED HEALTH CARE EDUCATION/TRAINING PROGRAM

## 2025-05-19 PROCEDURE — G0439 PPPS, SUBSEQ VISIT: HCPCS | Performed by: STUDENT IN AN ORGANIZED HEALTH CARE EDUCATION/TRAINING PROGRAM

## 2025-05-19 PROCEDURE — 1160F RVW MEDS BY RX/DR IN RCRD: CPT | Performed by: STUDENT IN AN ORGANIZED HEALTH CARE EDUCATION/TRAINING PROGRAM

## 2025-05-19 ASSESSMENT — PATIENT HEALTH QUESTIONNAIRE - PHQ9
SUM OF ALL RESPONSES TO PHQ QUESTIONS 1-9: 2
SUM OF ALL RESPONSES TO PHQ QUESTIONS 1-9: 2
1. LITTLE INTEREST OR PLEASURE IN DOING THINGS: NOT AT ALL
SUM OF ALL RESPONSES TO PHQ QUESTIONS 1-9: 2
2. FEELING DOWN, DEPRESSED OR HOPELESS: MORE THAN HALF THE DAYS
SUM OF ALL RESPONSES TO PHQ QUESTIONS 1-9: 2

## 2025-05-19 NOTE — PATIENT INSTRUCTIONS
blocked. A high-fat diet, smoking, and other factors increase the risk of heart disease.  Your doctor has found that you have a chance of having heart disease. A heart-healthy lifestyle can help keep your heart healthy and prevent heart disease. This lifestyle includes eating healthy, being active, staying at a weight that's healthy for you, and not smoking or using tobacco. It also includes taking medicines as directed, managing other health conditions, and trying to get a healthy amount of sleep.  Follow-up care is a key part of your treatment and safety. Be sure to make and go to all appointments, and call your doctor if you are having problems. It's also a good idea to know your test results and keep a list of the medicines you take.  How can you care for yourself at home?  Diet    Use less salt when you cook and eat. This helps lower your blood pressure. Taste food before salting. Add only a little salt when you think you need it. With time, your taste buds will adjust to less salt.     Eat fewer snack items, fast foods, canned soups, and other high-salt, high-fat, processed foods.     Read food labels and try to avoid saturated and trans fats. They increase your risk of heart disease by raising cholesterol levels.     Limit the amount of solid fat--butter, margarine, and shortening--you eat. Use olive, peanut, or canola oil when you cook. Bake, broil, and steam foods instead of frying them.     Eat a variety of fruit and vegetables every day. Dark green, deep orange, red, or yellow fruits and vegetables are especially good for you. Examples include spinach, carrots, peaches, and berries.     Foods high in fiber can reduce your cholesterol and provide important vitamins and minerals. High-fiber foods include whole-grain cereals and breads, oatmeal, beans, brown rice, citrus fruits, and apples.     Eat lean proteins. Heart-healthy proteins include seafood, lean meats and poultry, eggs, beans, peas, nuts, seeds,

## 2025-05-19 NOTE — PROGRESS NOTES
Subjective  Mei Schneider, 69 y.o. female presents today with:  Chief Complaint   Patient presents with    Medicare AW    Cardiomyopathy     Following with cardiology.     Smith's Esophagus with Dysplasia     Taking Omeprazole as directed.     Hypovitaminosis D     Taking Vitamin D daily.        History of Present Illness  The patient is a 69-year-old female who presents for follow-up.    She experienced a cardiac episode in late October 2024, which resulted in significant bruising extending from her wrist to her leg. Multiple consultations with Dr. Cardoza have occurred, the most recent being on 05/06/2025, with a follow-up scheduled for 09/16/2025. Fragile arteries necessitated a flat position for 5 hours post-procedure. Her ejection fraction was reported to be between 40 and 45. She has a history of COVID-19 infection. Her stress test results were normal. Currently, she is on baby aspirin and metoprolol 75 mg twice daily, increased from 50 mg due to persistent midsternal chest pain and discomfort under her left shoulder blade. Recently, she started Entresto, which she reports as beneficial.    She has been experiencing elevated blood sugar levels, with a recent reading of 104, which is unusual for her. She is interested in having her fasting blood sugar checked.    She continues her omeprazole 40 mg regimen for Smith's esophagus. Severe stomach and throat discomfort occur if she misses a dose.    Significant stress, anxiety, and depression have been present due to various personal issues, including financial difficulties following her 's death over 5 years ago. She has had to replace her hot water tank, gutters, and air conditioner, and has also had issues with her main water valve. Additionally, rabbits in her yard and woodpeckers causing damage to her property have been problematic. An  and a  have been hired to manage her finances. She has completed her medical power of  and is

## 2025-06-02 ENCOUNTER — HOSPITAL ENCOUNTER (OUTPATIENT)
Dept: WOMENS IMAGING | Age: 70
Discharge: HOME | End: 2025-06-04
Attending: STUDENT IN AN ORGANIZED HEALTH CARE EDUCATION/TRAINING PROGRAM
Payer: MEDICARE

## 2025-06-02 DIAGNOSIS — R00.2 PALPITATIONS: ICD-10-CM

## 2025-06-02 DIAGNOSIS — Z12.31 SCREENING MAMMOGRAM FOR BREAST CANCER: ICD-10-CM

## 2025-06-02 DIAGNOSIS — E55.9 HYPOVITAMINOSIS D: ICD-10-CM

## 2025-06-02 DIAGNOSIS — I49.3 FREQUENT PVCS: ICD-10-CM

## 2025-06-02 DIAGNOSIS — Z13.1 SCREENING FOR DIABETES MELLITUS: ICD-10-CM

## 2025-06-02 DIAGNOSIS — Z13.6 ENCOUNTER FOR LIPID SCREENING FOR CARDIOVASCULAR DISEASE: ICD-10-CM

## 2025-06-02 DIAGNOSIS — Z13.220 ENCOUNTER FOR LIPID SCREENING FOR CARDIOVASCULAR DISEASE: ICD-10-CM

## 2025-06-02 LAB
ALBUMIN SERPL-MCNC: 4.2 G/DL (ref 3.5–4.6)
ALP SERPL-CCNC: 113 U/L (ref 40–130)
ALT SERPL-CCNC: 14 U/L (ref 0–33)
ANION GAP SERPL CALCULATED.3IONS-SCNC: 12 MEQ/L (ref 9–15)
AST SERPL-CCNC: 16 U/L (ref 0–35)
BILIRUB SERPL-MCNC: 0.7 MG/DL (ref 0.2–0.7)
BUN SERPL-MCNC: 19 MG/DL (ref 8–23)
CALCIUM SERPL-MCNC: 9.6 MG/DL (ref 8.5–9.9)
CHLORIDE SERPL-SCNC: 109 MEQ/L (ref 95–107)
CHOLEST SERPL-MCNC: 233 MG/DL (ref 0–199)
CO2 SERPL-SCNC: 20 MEQ/L (ref 20–31)
CREAT SERPL-MCNC: 0.83 MG/DL (ref 0.5–0.9)
GLOBULIN SER CALC-MCNC: 2.8 G/DL (ref 2.3–3.5)
GLUCOSE FASTING: 133 MG/DL (ref 70–99)
HDLC SERPL-MCNC: 66 MG/DL (ref 40–59)
LDL CHOLESTEROL: 151 MG/DL (ref 0–129)
POTASSIUM SERPL-SCNC: 4.4 MEQ/L (ref 3.4–4.9)
PROT SERPL-MCNC: 7 G/DL (ref 6.3–8)
SODIUM SERPL-SCNC: 141 MEQ/L (ref 135–144)
TRIGLYCERIDE, FASTING: 81 MG/DL (ref 0–150)
VITAMIN D 25-HYDROXY: 25.3 NG/ML (ref 30–100)

## 2025-06-02 PROCEDURE — 77063 BREAST TOMOSYNTHESIS BI: CPT

## 2025-06-03 ENCOUNTER — RESULTS FOLLOW-UP (OUTPATIENT)
Age: 70
End: 2025-06-03

## 2025-06-03 NOTE — RESULT ENCOUNTER NOTE
Please inform patient vitamin d is on slightly low side. Recommend continued vitamin d supplementation. Lipid panel shows slightly increased levels- will need to continue to monitor. Metabolic panel stable- glucose was slightly high- will get A1c at follow up.

## 2025-06-04 ENCOUNTER — TELEPHONE (OUTPATIENT)
Age: 70
End: 2025-06-04

## 2025-06-04 NOTE — TELEPHONE ENCOUNTER
Patient calling Metoprolol is raising her blood sugar and pt would like to know what to do, sugar is going 133 on 06/02/25    Please advise and call patient back

## 2025-06-05 RX ORDER — CARVEDILOL 3.12 MG/1
3.12 TABLET ORAL 2 TIMES DAILY
Qty: 60 TABLET | Refills: 3 | Status: SHIPPED | OUTPATIENT
Start: 2025-06-05

## 2025-06-05 NOTE — TELEPHONE ENCOUNTER
Per Dr. Cardoza:  DC Metoprolol. Start Coreg 3.125 bid.     Toprol XL discontinued. New prescription for Coreg 3.125mg PO BID sent to [facility] for patient. Called patient to notify her of the new prescription. Patient verbalized understanding.

## 2025-06-05 NOTE — TELEPHONE ENCOUNTER
Pt called in and states that she would like a different beta blocker called into the pharmacy.    Pt states that she does not want: propranolol, metoprolol, atenolol. Pt read that these to could raise glucose.     Please advise and call pt back with response

## 2025-06-06 ENCOUNTER — APPOINTMENT (OUTPATIENT)
Dept: GENERAL RADIOLOGY | Age: 70
End: 2025-06-06
Payer: MEDICARE

## 2025-06-06 ENCOUNTER — HOSPITAL ENCOUNTER (EMERGENCY)
Age: 70
Discharge: HOME | End: 2025-06-06
Payer: MEDICARE

## 2025-06-06 VITALS
BODY MASS INDEX: 28.22 KG/M2 | OXYGEN SATURATION: 97 % | DIASTOLIC BLOOD PRESSURE: 80 MMHG | HEART RATE: 90 BPM | RESPIRATION RATE: 10 BRPM | SYSTOLIC BLOOD PRESSURE: 135 MMHG | HEIGHT: 66 IN | TEMPERATURE: 97.4 F | WEIGHT: 175.6 LBS

## 2025-06-06 DIAGNOSIS — R00.2 PALPITATIONS: Primary | ICD-10-CM

## 2025-06-06 LAB
ALBUMIN SERPL-MCNC: 3.7 G/DL (ref 3.5–4.6)
ALP SERPL-CCNC: 104 U/L (ref 40–130)
ALT SERPL-CCNC: 10 U/L (ref 0–33)
ANION GAP SERPL CALCULATED.3IONS-SCNC: 10 MEQ/L (ref 9–15)
AST SERPL-CCNC: 13 U/L (ref 0–35)
BASOPHILS # BLD: 0.1 K/UL (ref 0–0.2)
BASOPHILS NFR BLD: 0.5 %
BILIRUB SERPL-MCNC: 0.4 MG/DL (ref 0.2–0.7)
BUN SERPL-MCNC: 20 MG/DL (ref 8–23)
CALCIUM SERPL-MCNC: 9.1 MG/DL (ref 8.5–9.9)
CHLORIDE SERPL-SCNC: 109 MEQ/L (ref 95–107)
CO2 SERPL-SCNC: 23 MEQ/L (ref 20–31)
CREAT SERPL-MCNC: 0.88 MG/DL (ref 0.5–0.9)
EKG ATRIAL RATE: 97 BPM
EKG DIAGNOSIS: NORMAL
EKG P AXIS: 38 DEGREES
EKG P-R INTERVAL: 192 MS
EKG Q-T INTERVAL: 364 MS
EKG QRS DURATION: 124 MS
EKG QTC CALCULATION (BAZETT): 462 MS
EKG R AXIS: -26 DEGREES
EKG T AXIS: 64 DEGREES
EKG VENTRICULAR RATE: 97 BPM
EOSINOPHIL # BLD: 0.1 K/UL (ref 0–0.7)
EOSINOPHIL NFR BLD: 1 %
ERYTHROCYTE [DISTWIDTH] IN BLOOD BY AUTOMATED COUNT: 13.7 % (ref 11.5–14.5)
GLOBULIN SER CALC-MCNC: 2.5 G/DL (ref 2.3–3.5)
GLUCOSE SERPL-MCNC: 130 MG/DL (ref 70–99)
HCT VFR BLD AUTO: 45.4 % (ref 37–47)
HGB BLD-MCNC: 14.5 G/DL (ref 12–16)
INR PPP: 1
LACTATE BLDV-SCNC: 1.8 MMOL/L (ref 0.5–2.2)
LYMPHOCYTES # BLD: 1.6 K/UL (ref 1–4.8)
LYMPHOCYTES NFR BLD: 17.6 %
MAGNESIUM SERPL-MCNC: 1.9 MG/DL (ref 1.7–2.4)
MCH RBC QN AUTO: 30.6 PG (ref 27–31.3)
MCHC RBC AUTO-ENTMCNC: 31.9 % (ref 33–37)
MCV RBC AUTO: 95.8 FL (ref 79.4–94.8)
MONOCYTES # BLD: 0.7 K/UL (ref 0.2–0.8)
MONOCYTES NFR BLD: 7.8 %
NEUTROPHILS # BLD: 6.6 K/UL (ref 1.4–6.5)
NEUTS SEG NFR BLD: 72.7 %
PLATELET # BLD AUTO: 358 K/UL (ref 130–400)
POTASSIUM SERPL-SCNC: 4 MEQ/L (ref 3.4–4.9)
PROT SERPL-MCNC: 6.2 G/DL (ref 6.3–8)
PROTHROMBIN TIME: 13.7 SEC (ref 12.3–14.9)
RBC # BLD AUTO: 4.74 M/UL (ref 4.2–5.4)
SODIUM SERPL-SCNC: 142 MEQ/L (ref 135–144)
TROPONIN, HIGH SENSITIVITY: 9 NG/L (ref 0–19)
TROPONIN, HIGH SENSITIVITY: 9 NG/L (ref 0–19)
TSH REFLEX: 1.86 UIU/ML (ref 0.44–3.86)
WBC # BLD AUTO: 9.1 K/UL (ref 4.8–10.8)

## 2025-06-06 PROCEDURE — 93005 ELECTROCARDIOGRAM TRACING: CPT

## 2025-06-06 PROCEDURE — 83605 ASSAY OF LACTIC ACID: CPT

## 2025-06-06 PROCEDURE — 84484 ASSAY OF TROPONIN QUANT: CPT

## 2025-06-06 PROCEDURE — 83036 HEMOGLOBIN GLYCOSYLATED A1C: CPT

## 2025-06-06 PROCEDURE — 84443 ASSAY THYROID STIM HORMONE: CPT

## 2025-06-06 PROCEDURE — 80053 COMPREHEN METABOLIC PANEL: CPT

## 2025-06-06 PROCEDURE — 83735 ASSAY OF MAGNESIUM: CPT

## 2025-06-06 PROCEDURE — 85610 PROTHROMBIN TIME: CPT

## 2025-06-06 PROCEDURE — 36415 COLL VENOUS BLD VENIPUNCTURE: CPT

## 2025-06-06 PROCEDURE — 71045 X-RAY EXAM CHEST 1 VIEW: CPT

## 2025-06-06 PROCEDURE — 85025 COMPLETE CBC W/AUTO DIFF WBC: CPT

## 2025-06-06 PROCEDURE — 99285 EMERGENCY DEPT VISIT HI MDM: CPT

## 2025-06-06 RX ORDER — CARVEDILOL 3.12 MG/1
6.25 TABLET ORAL ONCE
Status: DISCONTINUED | OUTPATIENT
Start: 2025-06-06 | End: 2025-06-06

## 2025-06-06 ASSESSMENT — PAIN - FUNCTIONAL ASSESSMENT: PAIN_FUNCTIONAL_ASSESSMENT: NONE - DENIES PAIN

## 2025-06-06 NOTE — ED TRIAGE NOTES
Arrived with report of palpitations, hot and sweaty and elevated BP   Recently changed BP medications now on coreg was on metoprolol

## 2025-06-06 NOTE — ED PROVIDER NOTES
[facility] EMERGENCY DEPARTMENT  eMERGENCYdEPARTMENT eNCOUnter        Pt Name: Mei Schneider  MRN: [id number]  Birthdate 1955of evaluation: 6/6/2025  Provider:Conrad Tsai PA-C  7:25 PM EDT    CHIEF COMPLAINT       Chief Complaint   Patient presents with    Hypertension         HISTORY OF PRESENT ILLNESS  (Location/Symptom, Timing/Onset, Context/Setting, Quality, Duration, Modifying Factors, Severity.)   Mei Schneider is a 69 y.o. female who presents to the emergency department      Patient presents emergency department with chief complaint of palpitations.  She has a history of PACs and PVCs and HFrEF.  She was placed on Entresto within the past several months. She was recently discontinued off of her Lopressor as she believes it was causing hyperglycemia and she was switched to Coreg 3.25 mg.  She had her first dose of Coreg 2 hours prior to arrival.  She states that she was having palpitations at that time and her pulse was in the 130s per her blood pressure cuff.  She did not palpate a pulse.  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  She denies any chest pain but does report some baseline palpitations at time of arrival.  She has no known history of atrial fibrillation.          Nursing Notes were reviewed and I agree.    REVIEW OF SYSTEMS    (2-9 systems for level 4, 10 or more for level 5)     Review of Systems   All other systems reviewed and are negative.       as noted above the remainder of the review of systems was reviewed and negative.       PAST MEDICAL HISTORY     Past Medical History:   Diagnosis Date    Smith esophagus 2016, 2018    Dr Espinosa, surveillance q 3 years    Benign gastric polyp     DJD (degenerative joint disease) of knee     Fibromyalgia     GERD (gastroesophageal reflux disease) 2008    H/O bone density study 11/24/2012    osteopenia, was on Reclast    History of colonoscopy with polypectomy 2016, 2018    Dr FIGUEROA, tubulovillous adenoma, tubular adenoma, repeat  2023    History of vitamin D deficiency     Hyperlipidemia     Inconclusive mammogram 12/2018    repeat R breast in 6 months    Plantar fasciitis, bilateral     Reactive airway disease     PFT 2016         SURGICAL HISTORY       Past Surgical History:   Procedure Laterality Date    ABLATION OF DYSRHYTHMIC FOCUS  1993    attempted ablation     BREAST BIOPSY Right 1990    benign    CARDIAC PROCEDURE N/A 01/15/2025    Left heart cath / coronary angiography performed by Kalia Cardoza MD at [facility] CARDIAC CATH LAB    CATARACT REMOVAL WITH IMPLANT Bilateral     Dr at the [facility]    COLONOSCOPY  09/14/2015    w/polypectomy     COLONOSCOPY N/A 04/01/2024    COLORECTAL CANCER SCREENING, HIGH RISK with polypectomy performed by Hilton Dyson MD at [facility]    ENDOSCOPY, COLON, DIAGNOSTIC      HYSTERECTOMY, TOTAL ABDOMINAL (CERVIX REMOVED)      fibroids, endometriosis, age 29    LAPAROSCOPY      PARATHYROID GLAND SURGERY      R side    ID COLORECTAL SCRN; HI RISK IND N/A 10/01/2018    COLONOSCOPY performed by Joselito Espinosa MD at [facility]    ID ESOPHAGOGASTRODUODENOSCOPY TRANSORAL DIAGNOSTIC N/A 10/01/2018    EGD ESOPHAGOGASTRODUODENOSCOPY WITH DILATION performed by Joselito Espinosa MD at [facility]    TONSILLECTOMY AND ADENOIDECTOMY      UPPER GASTROINTESTINAL ENDOSCOPY  09/14/2015    w/bx,polypectomy     UPPER GASTROINTESTINAL ENDOSCOPY N/A 12/23/2021    EGD ESOPHAGOGASTRODUODENOSCOPY performed by Hilton Dyson MD at [facility]         CURRENT MEDICATIONS       Previous Medications    ASPIRIN 81 MG CHEWABLE TABLET    Take 1 tablet by mouth daily    CARVEDILOL (COREG) 3.125 MG TABLET    Take 1 tablet by mouth 2 times daily    CHOLECALCIFEROL (VITAMIN D3) 50 MCG (2000 UT) CAPS    1 po daily with meal    HANDICAP PLACARD MISC    by Does not apply route For medical condition lasting longer than 5 years.  Start date- 11/15/2023  End date- 11/15/2028    LACTOBACILLUS

## 2025-06-07 LAB
ESTIMATED AVERAGE GLUCOSE: 100 MG/DL
HBA1C MFR BLD: 5.1 % (ref 4–6)

## 2025-06-07 NOTE — DISCHARGE INSTRUCTIONS
Follow closely primary care provider and your primary cardiology team.  We are recommending increasing your morning dose of Coreg to 6.25 mg and continue with 3.125 mg dose at night.  Close monitoring of your blood pressures at home.  Avoid alcohol and caffeine in the interim.  Return for new or worsening symptoms

## 2025-06-09 ENCOUNTER — TELEPHONE (OUTPATIENT)
Age: 70
End: 2025-06-09

## 2025-06-09 LAB
EKG ATRIAL RATE: 97 BPM
EKG DIAGNOSIS: NORMAL
EKG P AXIS: 38 DEGREES
EKG P-R INTERVAL: 192 MS
EKG Q-T INTERVAL: 364 MS
EKG QRS DURATION: 124 MS
EKG QTC CALCULATION (BAZETT): 462 MS
EKG R AXIS: -26 DEGREES
EKG T AXIS: 64 DEGREES
EKG VENTRICULAR RATE: 97 BPM

## 2025-06-09 NOTE — TELEPHONE ENCOUNTER
PT called- wanted to let Dr Swartz know that Dr Cardoza changed her medication from  Toprol XL to  Coreg on thursday  (See chart)   She did end up at the ED on Friday /93   -  Scheduled ED f/u with IVELISSE Lepe on 6/11

## 2025-06-09 NOTE — TELEPHONE ENCOUNTER
Pt called in and states that she was in the er on 6/6/2025 due to her hr going up to 136.   Pt states her bp was 157/93 hr 136  when she went to the er. Pt states she took coreg 3.125mg to get her hr down, before she went to bed her hr was 87 after medication. Please advise

## 2025-06-10 RX ORDER — CARVEDILOL 6.25 MG/1
6.25 TABLET ORAL 2 TIMES DAILY
Qty: 180 TABLET | Refills: 1 | Status: CANCELLED | OUTPATIENT
Start: 2025-06-10

## 2025-06-10 RX ORDER — CARVEDILOL 3.12 MG/1
TABLET ORAL
Qty: 270 TABLET | Refills: 1 | Status: SHIPPED | OUTPATIENT
Start: 2025-06-10 | End: 2025-06-11

## 2025-06-10 NOTE — TELEPHONE ENCOUNTER
Per Dr. Cardoza:  Advance Coreg to 6.25 bid     Attempted to call patient to notify her of Dr. Cardoza's response. No answer, voicemail left for patient to call back.    New prescription for Coreg 6.25mg PO BID pended til patient calls back to be notified of the increase in dosage. Coreg 3.125mg PO BID will need to be discontinued from patient's medication list when she calls back.

## 2025-06-10 NOTE — TELEPHONE ENCOUNTER
Patient called back. Patient states that she is too nervous to take Coreg 6.25mg PO BID. She has been taking Coreg 6.25mg QAM and Coreg 3.125mg QHS. These were directions that she had received from her ER visit.     Patient states that since taking the Coreg as stated above, her HR and BP have been under control. HR has been in the 70-80s and /62.

## 2025-06-10 NOTE — TELEPHONE ENCOUNTER
Dr. Cardoza notified of patient's message and said \"ok\".    Patient will continue to take Coreg 3.125 QHS and 6.25 QAM. New prescription sent to [facility] in [place].     Called and spoke to patient. Notified her of the new prescription. Patient verbalized understanding.

## 2025-06-11 ENCOUNTER — OFFICE VISIT (OUTPATIENT)
Age: 70
End: 2025-06-11
Payer: MEDICARE

## 2025-06-11 VITALS
HEART RATE: 84 BPM | WEIGHT: 176 LBS | OXYGEN SATURATION: 97 % | DIASTOLIC BLOOD PRESSURE: 80 MMHG | HEIGHT: 66 IN | TEMPERATURE: 97.5 F | SYSTOLIC BLOOD PRESSURE: 132 MMHG | BODY MASS INDEX: 28.28 KG/M2

## 2025-06-11 DIAGNOSIS — I49.3 FREQUENT PVCS: ICD-10-CM

## 2025-06-11 DIAGNOSIS — R00.2 PALPITATIONS: Primary | ICD-10-CM

## 2025-06-11 PROCEDURE — 99214 OFFICE O/P EST MOD 30 MIN: CPT | Performed by: NURSE PRACTITIONER

## 2025-06-11 PROCEDURE — 1159F MED LIST DOCD IN RCRD: CPT | Performed by: NURSE PRACTITIONER

## 2025-06-11 PROCEDURE — 1123F ACP DISCUSS/DSCN MKR DOCD: CPT | Performed by: NURSE PRACTITIONER

## 2025-06-11 RX ORDER — CARVEDILOL 6.25 MG/1
TABLET ORAL
Qty: 180 TABLET | Refills: 2 | Status: SHIPPED | OUTPATIENT
Start: 2025-06-11

## 2025-06-11 ASSESSMENT — ENCOUNTER SYMPTOMS
RESPIRATORY NEGATIVE: 1
COUGH: 0
BLOOD IN STOOL: 0
SHORTNESS OF BREATH: 0
WHEEZING: 0

## 2025-06-11 NOTE — PROGRESS NOTES
monitored her blood pressure and pulse at home, noting an increase in heart rate from 101 to 136. She sought emergency care where her heart rate was recorded at 114 and she was tachycardic. Despite these symptoms, she reports feeling well overall.    She was prescribed Coreg 3.125 mg twice daily, which she started on Friday evening. Her Coreg dosage was subsequently increased to 6.25 mg twice daily in the morning and 3.125 mg at night. She reports feeling unwell with fluctuating vital signs, including a pulse rate that varied from 76 to 104 within an hour. She began taking the increased dose of Coreg on Saturday, with two doses in the morning and one at night. She took two doses of Coreg last night when her pulse was 107, which reduced her pulse to 99 and then 94. She plans to continue this regimen for the next few days and will contact the office on Monday if her condition does not improve. She is considering resuming metoprolol as she felt better on it, despite the elevated blood sugar levels.    She reports no shortness of breath on exertion but notes that her palpitations have worsened since her heart rate has been consistently elevated. Her heart rate was between 62 and 74 when she was on metoprolol. She also reports a decrease in appetite, gagging after a few bites of food, and wonders if this could be a side effect of her medication or due to anxiety.    She has been monitoring her blood pressure and pulse regularly, noting that her blood pressure can vary significantly within a short period, for example from 110/60 to 141/82 within half an hour. She does not own a pulse oximeter. She experienced cold and clammy sensations two nights ago, even though her blood pressure and pulse were normal, and felt lightheaded while running the sweeper this morning, leading her to suspect hypoglycemia. She consumed some sugar, which seemed to help. She has been advised to take her medication with food and is wondering if

## 2025-06-18 ENCOUNTER — CLINICAL SUPPORT (OUTPATIENT)
Age: 70
End: 2025-06-18

## 2025-06-18 VITALS — HEART RATE: 84 BPM | DIASTOLIC BLOOD PRESSURE: 82 MMHG | SYSTOLIC BLOOD PRESSURE: 120 MMHG

## 2025-06-18 NOTE — PROGRESS NOTES
Compared bp & hr with patients bp machine & manually. Both were close to similar. Patients bp machine reads her HR is abnormal although its in normal range.    Date of Service: 10/22/2020    The patient returns with recurrent right shoulder pain, her last injection of 03/15/2020 helping until recently, the patient requesting additional injections, exam unchanged.      While seated, the right subacromial space was injected with 40 mg of triamcinolone and bupivacaine/lidocaine solution, tolerated well.  Follow up as needed.  Expectations discussed.      Dictated By: Óscar Dawn III, MD  Signing Provider: Óscar Dawn III, MD AB/cornelia ([id number])  DD: 10/22/2020 10:54:49 TD: 10/23/2020 06:48:25    Copy Sent To:

## 2025-06-26 ENCOUNTER — HOSPITAL ENCOUNTER (OUTPATIENT)
Age: 70
Setting detail: OBSERVATION
Discharge: HOME | End: 2025-06-27
Attending: STUDENT IN AN ORGANIZED HEALTH CARE EDUCATION/TRAINING PROGRAM | Admitting: INTERNAL MEDICINE
Payer: MEDICARE

## 2025-06-26 ENCOUNTER — APPOINTMENT (OUTPATIENT)
Dept: GENERAL RADIOLOGY | Age: 70
End: 2025-06-26
Payer: MEDICARE

## 2025-06-26 ENCOUNTER — APPOINTMENT (OUTPATIENT)
Dept: CT IMAGING | Age: 70
End: 2025-06-26
Payer: MEDICARE

## 2025-06-26 DIAGNOSIS — R07.9 CHEST PAIN, UNSPECIFIED TYPE: ICD-10-CM

## 2025-06-26 DIAGNOSIS — R42 DIZZINESS: ICD-10-CM

## 2025-06-26 DIAGNOSIS — R00.2 PALPITATIONS: ICD-10-CM

## 2025-06-26 DIAGNOSIS — R55 NEAR SYNCOPE: Primary | ICD-10-CM

## 2025-06-26 DIAGNOSIS — I42.9 CARDIOMYOPATHY, UNSPECIFIED TYPE (HCC): ICD-10-CM

## 2025-06-26 LAB
ALBUMIN SERPL-MCNC: 4.3 G/DL (ref 3.5–4.6)
ALP SERPL-CCNC: 113 U/L (ref 40–130)
ALT SERPL-CCNC: 11 U/L (ref 0–33)
ANION GAP SERPL CALCULATED.3IONS-SCNC: 13 MEQ/L (ref 9–15)
AST SERPL-CCNC: 16 U/L (ref 0–35)
BASOPHILS # BLD: 0 K/UL (ref 0–0.2)
BASOPHILS NFR BLD: 0.6 %
BILIRUB SERPL-MCNC: 0.7 MG/DL (ref 0.2–0.7)
BUN SERPL-MCNC: 13 MG/DL (ref 8–23)
CALCIUM SERPL-MCNC: 9.9 MG/DL (ref 8.5–9.9)
CHLORIDE SERPL-SCNC: 103 MEQ/L (ref 95–107)
CHP ED QC CHECK: NORMAL
CO2 SERPL-SCNC: 24 MEQ/L (ref 20–31)
CREAT SERPL-MCNC: 0.81 MG/DL (ref 0.5–0.9)
EOSINOPHIL # BLD: 0.1 K/UL (ref 0–0.7)
EOSINOPHIL NFR BLD: 1.1 %
ERYTHROCYTE [DISTWIDTH] IN BLOOD BY AUTOMATED COUNT: 13.9 % (ref 11.5–14.5)
GLOBULIN SER CALC-MCNC: 2.6 G/DL (ref 2.3–3.5)
GLUCOSE BLD-MCNC: 114 MG/DL
GLUCOSE BLD-MCNC: 114 MG/DL (ref 70–99)
GLUCOSE SERPL-MCNC: 142 MG/DL (ref 70–99)
HCT VFR BLD AUTO: 49.2 % (ref 37–47)
HGB BLD-MCNC: 16.2 G/DL (ref 12–16)
LYMPHOCYTES # BLD: 1.2 K/UL (ref 1–4.8)
LYMPHOCYTES NFR BLD: 17.3 %
MAGNESIUM SERPL-MCNC: 2.1 MG/DL (ref 1.7–2.4)
MCH RBC QN AUTO: 31.2 PG (ref 27–31.3)
MCHC RBC AUTO-ENTMCNC: 32.9 % (ref 33–37)
MCV RBC AUTO: 94.8 FL (ref 79.4–94.8)
MONOCYTES # BLD: 0.4 K/UL (ref 0.2–0.8)
MONOCYTES NFR BLD: 5.1 %
NEUTROPHILS # BLD: 5.3 K/UL (ref 1.4–6.5)
NEUTS SEG NFR BLD: 75 %
PERFORMED ON: ABNORMAL
PLATELET # BLD AUTO: 401 K/UL (ref 130–400)
POTASSIUM SERPL-SCNC: 4 MEQ/L (ref 3.4–4.9)
PROT SERPL-MCNC: 6.9 G/DL (ref 6.3–8)
RBC # BLD AUTO: 5.19 M/UL (ref 4.2–5.4)
SODIUM SERPL-SCNC: 140 MEQ/L (ref 135–144)
TROPONIN, HIGH SENSITIVITY: 7 NG/L (ref 0–19)
TROPONIN, HIGH SENSITIVITY: <6 NG/L (ref 0–19)
TSH SERPL-MCNC: 1.54 UIU/ML (ref 0.44–3.86)
WBC # BLD AUTO: 7 K/UL (ref 4.8–10.8)

## 2025-06-26 PROCEDURE — 85025 COMPLETE CBC W/AUTO DIFF WBC: CPT

## 2025-06-26 PROCEDURE — 84443 ASSAY THYROID STIM HORMONE: CPT

## 2025-06-26 PROCEDURE — 70496 CT ANGIOGRAPHY HEAD: CPT

## 2025-06-26 PROCEDURE — 70498 CT ANGIOGRAPHY NECK: CPT

## 2025-06-26 PROCEDURE — 84484 ASSAY OF TROPONIN QUANT: CPT

## 2025-06-26 PROCEDURE — 6360000002 HC RX W HCPCS: Performed by: PHYSICIAN ASSISTANT

## 2025-06-26 PROCEDURE — 99285 EMERGENCY DEPT VISIT HI MDM: CPT

## 2025-06-26 PROCEDURE — G0378 HOSPITAL OBSERVATION PER HR: HCPCS

## 2025-06-26 PROCEDURE — 96375 TX/PRO/DX INJ NEW DRUG ADDON: CPT

## 2025-06-26 PROCEDURE — 71045 X-RAY EXAM CHEST 1 VIEW: CPT

## 2025-06-26 PROCEDURE — 6370000000 HC RX 637 (ALT 250 FOR IP): Performed by: PHYSICIAN ASSISTANT

## 2025-06-26 PROCEDURE — 6370000000 HC RX 637 (ALT 250 FOR IP): Performed by: INTERNAL MEDICINE

## 2025-06-26 PROCEDURE — 99222 1ST HOSP IP/OBS MODERATE 55: CPT | Performed by: INTERNAL MEDICINE

## 2025-06-26 PROCEDURE — 83735 ASSAY OF MAGNESIUM: CPT

## 2025-06-26 PROCEDURE — 80053 COMPREHEN METABOLIC PANEL: CPT

## 2025-06-26 PROCEDURE — 36415 COLL VENOUS BLD VENIPUNCTURE: CPT

## 2025-06-26 PROCEDURE — 2580000003 HC RX 258: Performed by: PHYSICIAN ASSISTANT

## 2025-06-26 PROCEDURE — 2500000003 HC RX 250 WO HCPCS: Performed by: INTERNAL MEDICINE

## 2025-06-26 PROCEDURE — 93005 ELECTROCARDIOGRAM TRACING: CPT | Performed by: STUDENT IN AN ORGANIZED HEALTH CARE EDUCATION/TRAINING PROGRAM

## 2025-06-26 PROCEDURE — 96365 THER/PROPH/DIAG IV INF INIT: CPT

## 2025-06-26 PROCEDURE — APPSS60 APP SPLIT SHARED TIME 46-60 MINUTES

## 2025-06-26 PROCEDURE — 6360000004 HC RX CONTRAST MEDICATION: Performed by: STUDENT IN AN ORGANIZED HEALTH CARE EDUCATION/TRAINING PROGRAM

## 2025-06-26 RX ORDER — POTASSIUM CHLORIDE 1500 MG/1
40 TABLET, EXTENDED RELEASE ORAL PRN
Status: DISCONTINUED | OUTPATIENT
Start: 2025-06-26 | End: 2025-06-27 | Stop reason: HOSPADM

## 2025-06-26 RX ORDER — ACETAMINOPHEN 325 MG/1
650 TABLET ORAL EVERY 6 HOURS PRN
Status: DISCONTINUED | OUTPATIENT
Start: 2025-06-26 | End: 2025-06-27 | Stop reason: HOSPADM

## 2025-06-26 RX ORDER — SODIUM CHLORIDE 0.9 % (FLUSH) 0.9 %
5-40 SYRINGE (ML) INJECTION EVERY 12 HOURS SCHEDULED
Status: DISCONTINUED | OUTPATIENT
Start: 2025-06-26 | End: 2025-06-27 | Stop reason: HOSPADM

## 2025-06-26 RX ORDER — SODIUM CHLORIDE 0.9 % (FLUSH) 0.9 %
5-40 SYRINGE (ML) INJECTION PRN
Status: DISCONTINUED | OUTPATIENT
Start: 2025-06-26 | End: 2025-06-27 | Stop reason: HOSPADM

## 2025-06-26 RX ORDER — POLYETHYLENE GLYCOL 3350 17 G/17G
17 POWDER, FOR SOLUTION ORAL DAILY PRN
Status: DISCONTINUED | OUTPATIENT
Start: 2025-06-26 | End: 2025-06-27 | Stop reason: HOSPADM

## 2025-06-26 RX ORDER — ENOXAPARIN SODIUM 100 MG/ML
40 INJECTION SUBCUTANEOUS DAILY
Status: DISCONTINUED | OUTPATIENT
Start: 2025-06-26 | End: 2025-06-27 | Stop reason: HOSPADM

## 2025-06-26 RX ORDER — POTASSIUM CHLORIDE 7.45 MG/ML
10 INJECTION INTRAVENOUS PRN
Status: DISCONTINUED | OUTPATIENT
Start: 2025-06-26 | End: 2025-06-27 | Stop reason: HOSPADM

## 2025-06-26 RX ORDER — MAGNESIUM SULFATE IN WATER 40 MG/ML
2000 INJECTION, SOLUTION INTRAVENOUS PRN
Status: DISCONTINUED | OUTPATIENT
Start: 2025-06-26 | End: 2025-06-27 | Stop reason: HOSPADM

## 2025-06-26 RX ORDER — SODIUM CHLORIDE 9 MG/ML
INJECTION, SOLUTION INTRAVENOUS PRN
Status: DISCONTINUED | OUTPATIENT
Start: 2025-06-26 | End: 2025-06-27 | Stop reason: HOSPADM

## 2025-06-26 RX ORDER — IOPAMIDOL 755 MG/ML
75 INJECTION, SOLUTION INTRAVASCULAR
Status: COMPLETED | OUTPATIENT
Start: 2025-06-26 | End: 2025-06-26

## 2025-06-26 RX ORDER — ONDANSETRON 4 MG/1
4 TABLET, ORALLY DISINTEGRATING ORAL EVERY 8 HOURS PRN
Status: DISCONTINUED | OUTPATIENT
Start: 2025-06-26 | End: 2025-06-27 | Stop reason: HOSPADM

## 2025-06-26 RX ORDER — ONDANSETRON 2 MG/ML
4 INJECTION INTRAMUSCULAR; INTRAVENOUS ONCE
Status: COMPLETED | OUTPATIENT
Start: 2025-06-26 | End: 2025-06-26

## 2025-06-26 RX ORDER — ACETAMINOPHEN 650 MG/1
650 SUPPOSITORY RECTAL EVERY 6 HOURS PRN
Status: DISCONTINUED | OUTPATIENT
Start: 2025-06-26 | End: 2025-06-27 | Stop reason: HOSPADM

## 2025-06-26 RX ORDER — ASPIRIN 81 MG/1
81 TABLET, CHEWABLE ORAL DAILY
Status: DISCONTINUED | OUTPATIENT
Start: 2025-06-27 | End: 2025-06-27 | Stop reason: HOSPADM

## 2025-06-26 RX ORDER — MECLIZINE HYDROCHLORIDE 25 MG/1
25 TABLET ORAL ONCE
Status: COMPLETED | OUTPATIENT
Start: 2025-06-26 | End: 2025-06-26

## 2025-06-26 RX ORDER — METOPROLOL TARTRATE 25 MG/1
25 TABLET, FILM COATED ORAL 3 TIMES DAILY
Status: DISCONTINUED | OUTPATIENT
Start: 2025-06-26 | End: 2025-06-26

## 2025-06-26 RX ORDER — ONDANSETRON 2 MG/ML
4 INJECTION INTRAMUSCULAR; INTRAVENOUS EVERY 6 HOURS PRN
Status: DISCONTINUED | OUTPATIENT
Start: 2025-06-26 | End: 2025-06-27 | Stop reason: HOSPADM

## 2025-06-26 RX ORDER — 0.9 % SODIUM CHLORIDE 0.9 %
1000 INTRAVENOUS SOLUTION INTRAVENOUS ONCE
Status: COMPLETED | OUTPATIENT
Start: 2025-06-26 | End: 2025-06-26

## 2025-06-26 RX ADMIN — METOPROLOL SUCCINATE 75 MG: 50 TABLET, EXTENDED RELEASE ORAL at 20:27

## 2025-06-26 RX ADMIN — PROMETHAZINE HYDROCHLORIDE 25 MG: 25 INJECTION INTRAMUSCULAR; INTRAVENOUS at 10:00

## 2025-06-26 RX ADMIN — MECLIZINE HYDROCHLORIDE 25 MG: 25 TABLET ORAL at 11:29

## 2025-06-26 RX ADMIN — SODIUM CHLORIDE 1000 ML: 0.9 INJECTION, SOLUTION INTRAVENOUS at 09:52

## 2025-06-26 RX ADMIN — ONDANSETRON 4 MG: 2 INJECTION, SOLUTION INTRAMUSCULAR; INTRAVENOUS at 08:43

## 2025-06-26 RX ADMIN — SACUBITRIL AND VALSARTAN 1 TABLET: 24; 26 TABLET, FILM COATED ORAL at 20:27

## 2025-06-26 RX ADMIN — SODIUM CHLORIDE, PRESERVATIVE FREE 10 ML: 5 INJECTION INTRAVENOUS at 20:32

## 2025-06-26 RX ADMIN — IOPAMIDOL 75 ML: 755 INJECTION, SOLUTION INTRAVENOUS at 10:05

## 2025-06-26 ASSESSMENT — PAIN - FUNCTIONAL ASSESSMENT: PAIN_FUNCTIONAL_ASSESSMENT: 0-10

## 2025-06-26 ASSESSMENT — ENCOUNTER SYMPTOMS
COLOR CHANGE: 0
NAUSEA: 1
RHINORRHEA: 0
EYE DISCHARGE: 0
SHORTNESS OF BREATH: 1
CONSTIPATION: 0
ABDOMINAL PAIN: 0
ABDOMINAL DISTENTION: 0
SORE THROAT: 0

## 2025-06-26 ASSESSMENT — PAIN DESCRIPTION - LOCATION: LOCATION: CHEST

## 2025-06-26 ASSESSMENT — PAIN SCALES - GENERAL
PAINLEVEL_OUTOF10: 0
PAINLEVEL_OUTOF10: 6

## 2025-06-26 ASSESSMENT — PAIN DESCRIPTION - PAIN TYPE: TYPE: ACUTE PAIN

## 2025-06-26 ASSESSMENT — PAIN DESCRIPTION - FREQUENCY: FREQUENCY: CONTINUOUS

## 2025-06-26 ASSESSMENT — PAIN DESCRIPTION - ONSET: ONSET: ON-GOING

## 2025-06-26 NOTE — CONSULTS
DATE OF CONSULTATION  6/26/2025    CONSULTANT  Almas rodriguez DO    REQUESTING PHYSICIAN  Dev Brooks MD     PRIMARY CARDIOLOGIST  Dr. Cardoza     REASON FOR CONSULTATION  Chief Complaint   Patient presents with    Chest Pain     Nausea, SOB       Hospital Day: 0       Patient is a 69 y.o. female who presents with a chief complaint of chest pain . Patient is followed on a regular basis by Dannielle Calderon DO.   Patient with past medical history of PVC, nonischemic cardiomyopathy EF 40% normal CO RS per cath on 1/2025 9/2024 SPECT negative  Echo on 9/2024 showing EF 40 to 45%  Came in to [facility] ER for palpitations that were associated with some shortness of breath and diaphoresis.  She also reports a feeling of something sitting on her chest and radiating to both shoulder blades.  Happened in the middle the night lasted about 1 to 2 minutes rating 8 out of 10 she said this was accompanied by nausea, lightheadedness, diaphoresis.  She had 2 episodes of chest pressure  Currently she is feeling okay symptom-free.  She initially said that she thought it was her blood sugar but blood sugar was within normal limits    Troponin negative  Chest x-ray showing cardiomegaly with overt edema  EKG sinus rhythm with occasional PVC no evidence of ischemia  Telemetry currently showing normal sinus rhythm    She does report that recently Dr. Cardoza switched her metoprolol to carvedilol      Past Medical History:   Diagnosis Date    Smith esophagus 2016, 2018    Dr Espinosa, surveillance q 3 years    Benign gastric polyp     DJD (degenerative joint disease) of knee     Fibromyalgia     GERD (gastroesophageal reflux disease) 2008    H/O bone density study 11/24/2012    osteopenia, was on Reclast    History of colonoscopy with polypectomy 2016, 2018    Dr FIGUEROA, tubulovillous adenoma, tubular adenoma, repeat 2023    History of vitamin D deficiency     Hyperlipidemia     Inconclusive mammogram 12/2018    repeat R breast in 6 months     provided for review.  MIP images are provided for review. Stenosis of the internal carotid arteries measured using NASCET criteria. Automated exposure control, iterative reconstruction, and/or weight based adjustment of the mA/kV was utilized to reduce the radiation dose to as low as reasonably achievable. Noncontrast CT of the head with reconstructed 2-D images are also provided for review. COMPARISON: None. HISTORY: ORDERING SYSTEM PROVIDED HISTORY: Stroke R/O Large Vessel Occlusion TECHNOLOGIST PROVIDED HISTORY: Additional Contrast?->1 Reason for exam:->Stroke R/O Large Vessel Occlusion What reading provider will be dictating this exam?->CRC FINDINGS: CT HEAD: BRAIN/VENTRICLES:  There is no acute intracranial hemorrhage, mass effect, or midline shift.  There is satisfactory overall gray-white matter differentiation.  There is cerebral atrophy.  The ventricular structures are symmetric and unremarkable.  The infratentorial structures are unremarkable. ORBITS: The visualized portion of the orbits demonstrate no acute abnormality. SINUSES:  The visualized paranasal sinuses and mastoid air cells demonstrate no acute abnormality. SOFT TISSUES/SKULL: No acute abnormality of the visualized skull or soft tissues. CTA NECK: AORTIC ARCH/ARCH VESSELS: No dissection or arterial injury.  No significant stenosis of the brachiocephalic or subclavian arteries. CAROTID ARTERIES: No dissection, arterial injury, or hemodynamically significant stenosis by NASCET criteria. VERTEBRAL ARTERIES: No dissection, arterial injury, or significant stenosis. SOFT TISSUES: The lung apices are clear.  No cervical or superior mediastinal lymphadenopathy.  The larynx and pharynx are unremarkable.  No acute abnormality of the salivary and thyroid glands. BONES: No acute osseous abnormality. CTA HEAD: ANTERIOR CIRCULATION: No significant stenosis of the intracranial internal carotid, anterior cerebral, or middle cerebral arteries. No aneurysm.

## 2025-06-26 NOTE — H&P
Hospital Medicine  History and Physical    Patient:  Mei Schneider  MRN: [id number]    CHIEF COMPLAINT:    Chief Complaint   Patient presents with    Chest Pain     Nausea, SOB       History Obtained From:  Patient, EMR  Primary Care Physician: Dannielle Swartz DO    HISTORY OF PRESENT ILLNESS:   The patient is a 69 y.o. female who presents with palpitations that were associated with shortness of breath and diaphoresis.  Symptoms were present for only a few minutes each time, 2 episodes.  Came to emergency room for further evaluation.    Past Medical History:      Diagnosis Date    Smith esophagus 2016, 2018    Dr Espinosa, surveillance q 3 years    Benign gastric polyp     DJD (degenerative joint disease) of knee     Fibromyalgia     GERD (gastroesophageal reflux disease) 2008    H/O bone density study 11/24/2012    osteopenia, was on Reclast    History of colonoscopy with polypectomy 2016, 2018    Dr FIGUEROA, tubulovillous adenoma, tubular adenoma, repeat 2023    History of vitamin D deficiency     Hyperlipidemia     Inconclusive mammogram 12/2018    repeat R breast in 6 months    Plantar fasciitis, bilateral     Reactive airway disease     PFT 2016       Past Surgical History:      Procedure Laterality Date    ABLATION OF DYSRHYTHMIC FOCUS  1993    attempted ablation     BREAST BIOPSY Right 1990    benign    CARDIAC PROCEDURE N/A 01/15/2025    Left heart cath / coronary angiography performed by Kalia Cardoza MD at [facility]    CATARACT REMOVAL WITH IMPLANT Bilateral     Dr at the [facility]    COLONOSCOPY  09/14/2015    w/polypectomy     COLONOSCOPY N/A 04/01/2024    COLORECTAL CANCER SCREENING, HIGH RISK with polypectomy performed by Hilton Dyson MD at [facility]    ENDOSCOPY, COLON, DIAGNOSTIC      HYSTERECTOMY, TOTAL ABDOMINAL (CERVIX REMOVED)      fibroids, endometriosis, age 29    LAPAROSCOPY      PARATHYROID GLAND SURGERY      R side    [place] COLORECTAL SCRN; HI RISK IND N/A 10/01/2018  PROVIDED HISTORY: Reason for exam:->cp. Nausea and dizziness What reading provider will be dictating this exam?->CRC FINDINGS: Cardiac size enlarged without overt edema.  No focal consolidation.  No pneumothorax or pleural effusion.  No acute osseous findings.     Cardiomegaly without overt edema.           Assessment and Plan   Palpitations: Substantially increased in frequency since 6/6 when her metoprolol succinate 75 Mg was changed to Coreg 6.25 Mg.  Reason for change was possible hyperglycemia on preventive care evaluation, however HbA1c from that day was 5.1.  Therefore, will discontinue Coreg, restart metoprolol.  Watch on telemetry overnight.  ACS ruled out by serial troponin.  DVT ppx  Disposition: Dependent on hospital course. Will discharge once medically stable. SW on board for discharge planning.     Patient Active Problem List   Diagnosis Code    Fibromyalgia M79.7    DJD (degenerative joint disease) of knee M17.9    Gastritis without bleeding K29.70    Polyp of colon K63.5    Gastric polyp K31.7    Smith's esophagus with dysplasia K22.719    PND (post-nasal drip) R09.82    Expiratory wheezing R06.2    Hiatal hernia K44.9    Non-seasonal allergic rhinitis J30.89    Palpitations R00.2    Cardiomyopathy (HCC) I42.9    Frequent PVCs I49.3    Chest pain R07.9       BRITTANY CARPENTER MD

## 2025-06-26 NOTE — ED NOTES
Received report from BIJU Jules. Assuming care at this time.     Patient alert in bed speaking with a visitor. No distress noted at this time. Patient stated that she is still feeling dizzy and nauseous. Patient stated that Zofran did not work. Provider made aware.

## 2025-06-26 NOTE — ED TRIAGE NOTES
Pt in with irregular heart rate, chest pressure, dizziness, nausea and diaphoresis that started last night. Pt states on an off pressure, palpitations and nausea. Pt states hx of Afib RVR. Took all meds this morning including 81 mg Aspirin and corgeg.

## 2025-06-26 NOTE — ED PROVIDER NOTES
[facility] EMERGENCY DEPARTMENT  EMERGENCY DEPARTMENT ENCOUNTER      Pt Name: Mei Schneider  MRN: [id number]  Birthdate 1955  Date of evaluation: 6/26/2025  Provider: Amish Aden PA-C  Note Started: 6/26/25 8:36 AM EDT    CHIEF COMPLAINT       Chief Complaint   Patient presents with    Chest Pain     Nausea, SOB         HISTORY OF PRESENT ILLNESS   (Location/Symptom, Timing/Onset, Context/Setting, Quality, Duration, Modifying Factors, Severity)  Note limiting factors.   Mei Schneider is a 69 y.o. female who presents to the emergency department with c/o cp that began this am. Pt states 2 episodes this am lasting only briefly 1-2 minutes then resolved. Pt states during same time felt as if she was having palpitation, she became sob and diaphoretic. Pain is now gone but still feels dizzy and nauseated. PMhx: Fibromyalgia, Barrets esophagitis, hyperlipidemia, PVCs    HPI    Nursing Notes were reviewed.    REVIEW OF SYSTEMS    (2-9 systems for level 4, 10 or more for level 5)     Review of Systems   Constitutional:  Negative for activity change and appetite change.   HENT:  Negative for congestion, ear discharge, ear pain, nosebleeds, rhinorrhea and sore throat.    Eyes:  Negative for discharge.   Respiratory:  Positive for shortness of breath.    Cardiovascular:  Positive for chest pain and palpitations. Negative for leg swelling.   Gastrointestinal:  Positive for nausea. Negative for abdominal distention, abdominal pain and constipation.   Genitourinary:  Negative for difficulty urinating and dysuria.   Musculoskeletal:  Negative for arthralgias.   Skin:  Negative for color change.   Neurological:  Negative for dizziness, syncope, numbness and headaches.   Psychiatric/Behavioral:  Negative for agitation and confusion.        Except as noted above the remainder of the review of systems was reviewed and negative.       PAST MEDICAL HISTORY     Past Medical History:   Diagnosis Date    Smith esophagus

## 2025-06-27 ENCOUNTER — APPOINTMENT (OUTPATIENT)
Age: 70
End: 2025-06-27
Attending: INTERNAL MEDICINE
Payer: MEDICARE

## 2025-06-27 ENCOUNTER — APPOINTMENT (OUTPATIENT)
Age: 70
End: 2025-06-27
Payer: MEDICARE

## 2025-06-27 VITALS
BODY MASS INDEX: 27.31 KG/M2 | HEIGHT: 67 IN | SYSTOLIC BLOOD PRESSURE: 101 MMHG | TEMPERATURE: 98.1 F | HEART RATE: 67 BPM | OXYGEN SATURATION: 98 % | RESPIRATION RATE: 19 BRPM | DIASTOLIC BLOOD PRESSURE: 65 MMHG | WEIGHT: 174 LBS

## 2025-06-27 LAB
ECHO BSA: 1.93 M2
EKG ATRIAL RATE: 87 BPM
EKG DIAGNOSIS: NORMAL
EKG P AXIS: 48 DEGREES
EKG P-R INTERVAL: 182 MS
EKG Q-T INTERVAL: 344 MS
EKG QRS DURATION: 124 MS
EKG QTC CALCULATION (BAZETT): 413 MS
EKG R AXIS: -33 DEGREES
EKG T AXIS: 78 DEGREES
EKG VENTRICULAR RATE: 87 BPM

## 2025-06-27 PROCEDURE — 6360000002 HC RX W HCPCS: Performed by: INTERNAL MEDICINE

## 2025-06-27 PROCEDURE — G0378 HOSPITAL OBSERVATION PER HR: HCPCS

## 2025-06-27 PROCEDURE — 6370000000 HC RX 637 (ALT 250 FOR IP): Performed by: INTERNAL MEDICINE

## 2025-06-27 PROCEDURE — 2500000003 HC RX 250 WO HCPCS: Performed by: INTERNAL MEDICINE

## 2025-06-27 PROCEDURE — 99233 SBSQ HOSP IP/OBS HIGH 50: CPT | Performed by: INTERNAL MEDICINE

## 2025-06-27 PROCEDURE — 96372 THER/PROPH/DIAG INJ SC/IM: CPT

## 2025-06-27 PROCEDURE — 93246 EXT ECG>7D<15D RECORDING: CPT

## 2025-06-27 RX ORDER — LANOLIN ALCOHOL/MO/W.PET/CERES
400 CREAM (GRAM) TOPICAL DAILY
Qty: 90 TABLET | Refills: 3 | Status: SHIPPED | OUTPATIENT
Start: 2025-06-27

## 2025-06-27 RX ORDER — LANOLIN ALCOHOL/MO/W.PET/CERES
400 CREAM (GRAM) TOPICAL DAILY
Status: DISCONTINUED | OUTPATIENT
Start: 2025-06-27 | End: 2025-06-27 | Stop reason: HOSPADM

## 2025-06-27 RX ORDER — METOPROLOL SUCCINATE 25 MG/1
75 TABLET, EXTENDED RELEASE ORAL 2 TIMES DAILY
Qty: 540 TABLET | Refills: 3 | Status: SHIPPED | OUTPATIENT
Start: 2025-06-27

## 2025-06-27 RX ADMIN — SODIUM CHLORIDE, PRESERVATIVE FREE 10 ML: 5 INJECTION INTRAVENOUS at 08:52

## 2025-06-27 RX ADMIN — METOPROLOL SUCCINATE 75 MG: 50 TABLET, EXTENDED RELEASE ORAL at 08:52

## 2025-06-27 RX ADMIN — SACUBITRIL AND VALSARTAN 1 TABLET: 24; 26 TABLET, FILM COATED ORAL at 08:52

## 2025-06-27 RX ADMIN — ENOXAPARIN SODIUM 40 MG: 100 INJECTION SUBCUTANEOUS at 08:52

## 2025-06-27 RX ADMIN — ASPIRIN 81 MG: 81 TABLET, CHEWABLE ORAL at 08:52

## 2025-06-27 RX ADMIN — Medication 400 MG: at 08:52

## 2025-06-27 NOTE — PROGRESS NOTES
Iv and tele removed for discharge. Care plan completed for discharge. Appropriate education addressed in discharge instructions. Instructions completed and reviewed with patient. Verbalize understanding of instructions and follow up. Personal belongings gathered. No complaints. Pt left unit ambulatory.    Pt discharged home with holter monitor placed.

## 2025-06-27 NOTE — PLAN OF CARE
Problem: Discharge Planning  Goal: Discharge to home or other facility with appropriate resources  6/27/2025 1033 by Levon Grant RN  Outcome: Adequate for Discharge  6/27/2025 1033 by Levon Grant RN  Outcome: Adequate for Discharge  6/26/2025 2316 by Niki Malin RN  Outcome: Progressing     Problem: Pain  Goal: Verbalizes/displays adequate comfort level or baseline comfort level  6/27/2025 1033 by Levon Grant RN  Outcome: Adequate for Discharge  6/27/2025 1033 by Levon Grant RN  Outcome: Adequate for Discharge  6/26/2025 2316 by Niki Malin RN  Outcome: Progressing

## 2025-06-27 NOTE — CARE COORDINATION
Case Management Assessment  Initial Evaluation    Date/Time of Evaluation: 6/27/2025 9:16 AM  Assessment Completed by: KAYLYNN Webber    If patient is discharged prior to next notation, then this note serves as note for discharge by case management.    Patient Name: Mei Schneider                   YOB: 1955  Diagnosis: Palpitations [R00.2]  Dizziness [R42]  Near syncope [R55]  Chest pain, unspecified type [R07.9]                   Date / Time: 6/26/2025  8:24 AM    Patient Admission Status: Observation   Readmission Risk (Low < 19, Mod (19-27), High > 27): Readmission Risk Score: 7.3    Current PCP: Dannielle Swartz, DO  PCP verified by CM? Yes    Chart Reviewed: Yes      History Provided by: Patient  Patient Orientation: Alert and Oriented    Patient Cognition: Alert    Hospitalization in the last 30 days (Readmission):  No    If yes, Readmission Assessment in CM Navigator will be completed.    Advance Directives:      Code Status: Full Code   Patient's Primary Decision Maker is: Legal Next of Kin    Primary Decision Maker: Joey Quiñones - Brother/Sister - [phone]    Discharge Planning:    Patient lives with: Alone Type of Home: House  Primary Care Giver: Self  Patient Support Systems include: Family Members   Current Financial resources: Medicare  Current community resources: None  Current services prior to admission: None            Current DME:              Type of Home Care services:  None    ADLS  Prior functional level: Independent in ADLs/IADLs  Current functional level: Independent in ADLs/IADLs    PT AM-PAC:   /24  OT AM-PAC:   /24    Family can provide assistance at DC: Yes  Would you like Case Management to discuss the discharge plan with any other family members/significant others, and if so, who? Yes (BROTHER)  Plans to Return to Present Housing: Yes  Other Identified Issues/Barriers to RETURNING to current housing: MEDICAL CLEARANCE   Potential Assistance needed at discharge:

## 2025-06-27 NOTE — DISCHARGE SUMMARY
[facility] Hospital Medicine Discharge Summary    Mei Schneider  :  1955  MRN:  [id number]    Admit date:  2025    Discharge date:  2025    Admitting Physician:  Dev Brooks MD  Primary Care Physician:  Dannielle Swartz,     Discharge Diagnoses:    Principal Problem:    Palpitations  Active Problems:    Near syncope  Resolved Problems:    * No resolved hospital problems. *    Chief Complaint   Patient presents with    Chest Pain     Nausea, SOB     Condition: improved   Activity: no restrictions   Diet: regular  Disposition: home  Functional Status: ambulatory    Hospital Course:   69-year-old female present with palpitations that began after changing medication from metoprolol to carvedilol.  Her beta-blocker was switched back and she did well.  Cardiology also added magnesium.    Significant Findings:   N/a    Exam on Discharge:   /65   Pulse 67   Temp 98.1 °F (36.7 °C) (Oral)   Resp 19   Ht 1.702 m (5' 7\")   Wt 78.9 kg (174 lb) Comment: no scale  SpO2 98%   BMI 27.25 kg/m²   General appearance: alert, cooperative and no distress  Mental Status: oriented to person, place and time and normal affect  Lungs: clear to auscultation bilaterally, normal effort  Heart: regular rate and rhythm, no murmur  Abdomen: soft, nontender, nondistended, bowel sounds present, no masses  Extremities: no edema, redness, tenderness in the calves  Skin: no gross lesions, rashes    Discharge Medication List:     Medication List        START taking these medications      magnesium oxide 400 (240 Mg) MG tablet  Commonly known as: MAG-OX  Take 1 tablet by mouth daily     metoprolol succinate 25 MG extended release tablet  Commonly known as: TOPROL XL  Take 3 tablets by mouth in the morning and at bedtime            CONTINUE taking these medications      aspirin 81 MG chewable tablet  Take 1 tablet by mouth daily     DAILY MULTIVITAMIN PO     Handicap Placard Misc  by Does not apply route

## 2025-06-27 NOTE — ACP (ADVANCE CARE PLANNING)
Advance Care Planning   Healthcare Decision Maker:    Primary Decision Maker: Joey Quiñones - Brother/Sister - [phone]    Click here to complete Healthcare Decision Makers including selection of the Healthcare Decision Maker Relationship (ie \"Primary\").  Today we documented Decision Maker(s) consistent with Legal Next of Kin hierarchy.       Electronically signed by KAYLYNN Webber on 6/27/2025 at 9:14 AM  '

## 2025-06-27 NOTE — PROGRESS NOTES
Chief Complaint   Patient presents with    Chest Pain     Nausea, SOB       SUBJECTIVE:  Pt denies chest pain, dyspnea.  Feeling better overall.  Normal sinus rhythm on telemetry with frequent PVCs    Past Medical History:   Diagnosis Date    Smith esophagus 2016, 2018    Dr Espinosa, surveillance q 3 years    Benign gastric polyp     DJD (degenerative joint disease) of knee     Fibromyalgia     GERD (gastroesophageal reflux disease) 2008    H/O bone density study 11/24/2012    osteopenia, was on Reclast    History of colonoscopy with polypectomy 2016, 2018    Dr FIGUEROA, tubulovillous adenoma, tubular adenoma, repeat 2023    History of vitamin D deficiency     Hyperlipidemia     Inconclusive mammogram 12/2018    repeat R breast in 6 months    Plantar fasciitis, bilateral     Reactive airway disease     PFT 2016     Patient Active Problem List   Diagnosis    Fibromyalgia    DJD (degenerative joint disease) of knee    Gastritis without bleeding    Polyp of colon    Gastric polyp    Smith's esophagus with dysplasia    PND (post-nasal drip)    Expiratory wheezing    Hiatal hernia    Non-seasonal allergic rhinitis    Palpitations    Cardiomyopathy (HCC)    Frequent PVCs    Chest pain    Near syncope       Current Facility-Administered Medications   Medication Dose Route Frequency Provider Last Rate Last Admin    sodium chloride flush 0.9 % injection 5-40 mL  5-40 mL IntraVENous 2 times per day Dev Brooks MD   10 mL at 06/26/25 2032    sodium chloride flush 0.9 % injection 5-40 mL  5-40 mL IntraVENous PRN Dev Brooks MD        0.9 % sodium chloride infusion   IntraVENous PRN Dev Brooks MD        potassium chloride (KLOR-CON M) extended release tablet 40 mEq  40 mEq Oral PRN Dev Brooks MD        Or    potassium bicarb-citric acid (EFFER-K) effervescent tablet 40 mEq  40 mEq Oral PRN Dev Brooks MD        Or    potassium chloride 10 mEq/100 mL IVPB (Peripheral Line)  10 mEq IntraVENous PRN Todd

## 2025-06-30 ENCOUNTER — CARE COORDINATION (OUTPATIENT)
Dept: CARE COORDINATION | Age: 70
End: 2025-06-30

## 2025-06-30 NOTE — CARE COORDINATION
Care Transitions Note    Initial Call - Call within 2 business days of discharge: Yes    Attempted to reach patient for transitions of care follow up. Unable to reach patient.    Outreach Attempts:   Unable to leave message.     Patient: Mei Schneider    Patient : 1955   MRN: [id number]    Reason for Admission: Palpitations, dizzy  Discharge Date: 25  RURS: Readmission Risk Score: 7.3    Last Discharge Facility       Date Complaint Diagnosis Description Type Department Provider    25 Chest Pain Near syncope ... ED to Hosp-Admission (Discharged) (ADMITTED) MLOZ1W Kolby Cintron, DO; Alea Higginbotham...            Was this an external facility discharge? No    Follow Up Appointment:   Patient has hospital follow up appointment scheduled within 14 days of discharge.    Future Appointments         Provider Specialty Dept Phone    2025 8:30 AM Dannielle Swartz DO Family Medicine [phone]    2025 12:45 PM Kalia Cardoza MD Cardiology [phone]    2025 8:30 AM Dannielle Swartz DO Family Medicine [phone]            Plan for follow-up on next business day.      Regine Sierra RN

## 2025-07-01 ENCOUNTER — CARE COORDINATION (OUTPATIENT)
Dept: CARE COORDINATION | Age: 70
End: 2025-07-01

## 2025-07-01 NOTE — CARE COORDINATION
Care Transitions Note    Initial Call - Call within 2 business days of discharge: Yes    Attempted to reach patient for transitions of care follow up. Unable to reach patient.    Outreach Attempts:   HIPAA compliant voicemail left for patient.     Patient: Mei Schneider    Patient : 1955   MRN: <[id number]>    Reason for Admission: Palpitations  Discharge Date: 25  RURS: Readmission Risk Score: 7.3    Last Discharge Facility       Date Complaint Diagnosis Description Type Department Provider    25 Chest Pain Near syncope ... ED to Hosp-Admission (Discharged) (ADMITTED) MLOZ1W Kolby Cintron DO; Alea Higginbotham...            Was this an external facility discharge? No    Follow Up Appointment:   Patient has hospital follow up appointment scheduled within 14 days of discharge.    Future Appointments         Provider Specialty Dept Phone    2025 8:30 AM Dannielle Swartz DO Family Medicine [phone]    2025 12:15 PM Kalia Cardoza MD Cardiology [phone]    2025 12:45 PM Kalia Cardoza MD Cardiology [phone]    2025 8:30 AM Dannielle Swartz DO Family Medicine [phone]            No further follow-up call indicated     Regine Sierra RN

## 2025-07-07 ENCOUNTER — OFFICE VISIT (OUTPATIENT)
Age: 70
End: 2025-07-07

## 2025-07-07 VITALS
OXYGEN SATURATION: 98 % | RESPIRATION RATE: 14 BRPM | HEIGHT: 67 IN | HEART RATE: 72 BPM | DIASTOLIC BLOOD PRESSURE: 82 MMHG | TEMPERATURE: 97.9 F | WEIGHT: 179 LBS | SYSTOLIC BLOOD PRESSURE: 132 MMHG | BODY MASS INDEX: 28.09 KG/M2

## 2025-07-07 DIAGNOSIS — R55 NEAR SYNCOPE: ICD-10-CM

## 2025-07-07 DIAGNOSIS — Z09 HOSPITAL DISCHARGE FOLLOW-UP: Primary | ICD-10-CM

## 2025-07-07 DIAGNOSIS — R00.2 PALPITATIONS: ICD-10-CM

## 2025-07-07 DIAGNOSIS — I49.3 FREQUENT PVCS: ICD-10-CM

## 2025-07-07 NOTE — PROGRESS NOTES
Post-Discharge Transitional Care Follow Up      Mei Schneider   YOB: 1955    Date of Office Visit:  7/7/2025  Date of Hospital Admission: 6/26/25  Date of Hospital Discharge: 6/27/25  Readmission Risk Score (high >=14%. Medium >=10%):Readmission Risk Score: 7.3      Care management risk score Rising risk (score 2-5) and Complex Care (Scores >=6): No Risk Score On File     Non face to face  following discharge, date last encounter closed (first attempt may have been earlier): 07/01/2025     Call initiated 2 business days of discharge: Yes     Hospital discharge follow-up  -     TX DISCHARGE MEDS RECONCILED W/ CURRENT OUTPATIENT MED LIST  Frequent PVCs  Near syncope  Palpitations      Medical Decision Making: moderate complexity  Return as scheduled.         Assessment & Plan  Post-hospital discharge follow-up.  Hospitalization reviewed.  Medication reconciliation completed.  - Experienced significant side effects from Coreg, including lightheadedness, dizziness, and an elevated heart rate, leading to hospitalization.  - Coreg was discontinued, and metoprolol succinate ER 75 mg twice a day was resumed, resulting in improved symptoms.  - Prescribed magnesium oxide 400 mg once a day to calm PVCs but experienced adverse effects such as nausea and dizziness, leading to discontinuation.  - Will continue with metoprolol succinate ER 75 mg twice a day and will not resume magnesium oxide.  Follow-up as scheduled with cardiology    Ankle swelling.  - Reported ankle swelling, likely due to prolonged travel.  - Swelling is not associated with pitting edema.  - Advised to elevate feet and monitor the swelling.    Medication management.  - Advised to add Coreg and Band-Aids to the list of allergies due to previous adverse reactions.  - Medication regimen includes metoprolol succinate ER 75 mg twice a day.    Health maintenance.  - An A1c test will be conducted in 11/2025.        Subjective:     Chief Complaint

## 2025-07-20 LAB — ECHO BSA: 1.93 M2

## 2025-07-21 DIAGNOSIS — R00.2 PALPITATIONS: ICD-10-CM

## 2025-08-07 ENCOUNTER — OFFICE VISIT (OUTPATIENT)
Age: 70
End: 2025-08-07
Payer: MEDICARE

## 2025-08-07 VITALS
SYSTOLIC BLOOD PRESSURE: 136 MMHG | HEART RATE: 63 BPM | BODY MASS INDEX: 28.16 KG/M2 | DIASTOLIC BLOOD PRESSURE: 90 MMHG | WEIGHT: 179.8 LBS | OXYGEN SATURATION: 97 % | RESPIRATION RATE: 15 BRPM

## 2025-08-07 DIAGNOSIS — I49.3 PVC (PREMATURE VENTRICULAR CONTRACTION): ICD-10-CM

## 2025-08-07 DIAGNOSIS — I42.9 CARDIOMYOPATHY, UNSPECIFIED TYPE (HCC): ICD-10-CM

## 2025-08-07 DIAGNOSIS — I42.0 DILATED CARDIOMYOPATHY (HCC): ICD-10-CM

## 2025-08-07 DIAGNOSIS — I10 HYPERTENSION, UNSPECIFIED TYPE: ICD-10-CM

## 2025-08-07 DIAGNOSIS — K22.719 BARRETT'S ESOPHAGUS WITH DYSPLASIA: Primary | ICD-10-CM

## 2025-08-07 DIAGNOSIS — R00.2 PALPITATIONS: ICD-10-CM

## 2025-08-07 PROCEDURE — 1159F MED LIST DOCD IN RCRD: CPT | Performed by: INTERNAL MEDICINE

## 2025-08-07 PROCEDURE — 3075F SYST BP GE 130 - 139MM HG: CPT | Performed by: INTERNAL MEDICINE

## 2025-08-07 PROCEDURE — 99214 OFFICE O/P EST MOD 30 MIN: CPT | Performed by: INTERNAL MEDICINE

## 2025-08-07 PROCEDURE — 1123F ACP DISCUSS/DSCN MKR DOCD: CPT | Performed by: INTERNAL MEDICINE

## 2025-08-07 PROCEDURE — 3080F DIAST BP >= 90 MM HG: CPT | Performed by: INTERNAL MEDICINE

## 2025-08-07 PROCEDURE — 1126F AMNT PAIN NOTED NONE PRSNT: CPT | Performed by: INTERNAL MEDICINE

## 2025-08-07 ASSESSMENT — ENCOUNTER SYMPTOMS
NAUSEA: 0
BLOOD IN STOOL: 0
EYES NEGATIVE: 1
COUGH: 0
GASTROINTESTINAL NEGATIVE: 1
WHEEZING: 0
SHORTNESS OF BREATH: 1
CHEST TIGHTNESS: 0
STRIDOR: 0

## (undated) DEVICE — GLIDESHEATH SLENDER STAINLESS STEEL KIT: Brand: GLIDESHEATH SLENDER

## (undated) DEVICE — Device

## (undated) DEVICE — SINGLE PORT MANIFOLD: Brand: NEPTUNE 2

## (undated) DEVICE — CONMED SCOPE SAVER BITE BLOCK, 20X27 MM: Brand: SCOPE SAVER

## (undated) DEVICE — TUBE SET 96 MM 64 MM H2O PERISTALTIC STD AUX CHANNEL

## (undated) DEVICE — CATHETER DIAG 5FR L100CM LUMN ID0.047IN JL4 CRV 0 SIDE H

## (undated) DEVICE — FORCEPS BX L240CM JAW DIA2.8MM L CAP W/ NDL MIC MESH TOOTH

## (undated) DEVICE — SHEET,DRAPE,53X77,STERILE: Brand: MEDLINE

## (undated) DEVICE — TRAP POLYP BALEEN

## (undated) DEVICE — CATHETER COR DIAG PIGTAILS PIG 145 CRV 5FR 110CM 6 SIDE H

## (undated) DEVICE — Device: Brand: ENDO SMARTCAP

## (undated) DEVICE — SYRINGE MED 30ML STD CLR PLAS LUERLOCK TIP N CTRL DISP

## (undated) DEVICE — GUIDEWIRE VASC L260CM DIA0.035IN TIP L3MM STD EXCHG PTFE J

## (undated) DEVICE — BRUSH ENDO CLN L90.5IN SHTH DIA1.7MM BRIST DIA5-7MM 2-6MM

## (undated) DEVICE — TUBING, SUCTION, 1/4" X 10', STRAIGHT: Brand: MEDLINE

## (undated) DEVICE — PERCUTANEOUS ENTRY THINWALL NEEDLE  ONE-PART: Brand: COOK

## (undated) DEVICE — ENDO CARRY-ON PROCEDURE KIT: Brand: ENDO CARRY-ON PROCEDURE KIT

## (undated) DEVICE — CONTAINER,SPECIMEN,OR STERILE,4OZ: Brand: MEDLINE

## (undated) DEVICE — KIT MFLD ISOLATN NACL CNTRST PRT TBNG SPIK W/ PRSS TRNSDUC

## (undated) DEVICE — SNARE VASC L240CM LOOP W10MM SHTH DIA2.4MM RND STIFF CLD

## (undated) DEVICE — KIT ANGIO W/ AT P65 PREM HND CTRL FOR CNTRST DEL ANGIOTOUCH

## (undated) DEVICE — GLOVE SURG SZ 6 THK91MIL LTX FREE SYN POLYISOPRENE ANTI

## (undated) DEVICE — GLOVE ORANGE PI 7 1/2   MSG9075

## (undated) DEVICE — DRESSING QUIKCLOT FEMORAL INTERVENTIONAL 1.5X1.5 IN

## (undated) DEVICE — CATHETER DIAG 5FR L100CM SPEC 3 DRC CRV SZ DBL BRAID WIRE

## (undated) DEVICE — CATHETER ANGIO 5FR L100CM GRY S STL NYL JR4 3 SEG BRAID L

## (undated) DEVICE — PINNACLE INTRODUCER SHEATH: Brand: PINNACLE

## (undated) DEVICE — DRAPE SURG BRACH STRL

## (undated) DEVICE — 3M™ TEGADERM™ TRANSPARENT FILM DRESSING FRAME STYLE, 1626W, 4 IN X 4-3/4 IN (10 CM X 12 CM), 50/CT 4CT/CASE: Brand: 3M™ TEGADERM™

## (undated) DEVICE — SPONGE GZ W4XL4IN COT 12 PLY TYP VII WVN C FLD DSGN STERILE